# Patient Record
Sex: MALE | Race: WHITE | NOT HISPANIC OR LATINO | Employment: OTHER | URBAN - METROPOLITAN AREA
[De-identification: names, ages, dates, MRNs, and addresses within clinical notes are randomized per-mention and may not be internally consistent; named-entity substitution may affect disease eponyms.]

---

## 2017-03-28 ENCOUNTER — APPOINTMENT (OUTPATIENT)
Dept: LAB | Facility: CLINIC | Age: 82
End: 2017-03-28
Payer: MEDICARE

## 2017-03-28 ENCOUNTER — TRANSCRIBE ORDERS (OUTPATIENT)
Dept: LAB | Facility: CLINIC | Age: 82
End: 2017-03-28

## 2017-03-28 DIAGNOSIS — E11.00 TYPE 2 DIABETES MELLITUS WITH HYPEROSMOLARITY WITHOUT COMA, WITH LONG-TERM CURRENT USE OF INSULIN (HCC): Primary | ICD-10-CM

## 2017-03-28 DIAGNOSIS — G31.89: ICD-10-CM

## 2017-03-28 DIAGNOSIS — Z79.4 TYPE 2 DIABETES MELLITUS WITH HYPEROSMOLARITY WITHOUT COMA, WITH LONG-TERM CURRENT USE OF INSULIN (HCC): Primary | ICD-10-CM

## 2017-03-28 LAB
CREAT UR-MCNC: 113 MG/DL
EST. AVERAGE GLUCOSE BLD GHB EST-MCNC: 171 MG/DL
HBA1C MFR BLD: 7.6 % (ref 4.2–6.3)
MICROALBUMIN UR-MCNC: 36.2 MG/L (ref 0–20)
MICROALBUMIN/CREAT 24H UR: 32 MG/G CREATININE (ref 0–30)
VIT B12 SERPL-MCNC: 251 PG/ML (ref 100–900)

## 2017-03-28 PROCEDURE — 82607 VITAMIN B-12: CPT

## 2017-03-28 PROCEDURE — 82043 UR ALBUMIN QUANTITATIVE: CPT

## 2017-03-28 PROCEDURE — 83036 HEMOGLOBIN GLYCOSYLATED A1C: CPT

## 2017-03-28 PROCEDURE — 36415 COLL VENOUS BLD VENIPUNCTURE: CPT

## 2017-03-28 PROCEDURE — 82570 ASSAY OF URINE CREATININE: CPT

## 2017-07-31 ENCOUNTER — APPOINTMENT (OUTPATIENT)
Dept: LAB | Facility: CLINIC | Age: 82
End: 2017-07-31
Payer: MEDICARE

## 2017-07-31 ENCOUNTER — TRANSCRIBE ORDERS (OUTPATIENT)
Dept: LAB | Facility: CLINIC | Age: 82
End: 2017-07-31

## 2017-07-31 DIAGNOSIS — E13.9 OTHER SPECIFIED DIABETES MELLITUS: Primary | ICD-10-CM

## 2017-07-31 LAB
ALT SERPL W P-5'-P-CCNC: 26 U/L (ref 12–78)
ANION GAP SERPL CALCULATED.3IONS-SCNC: 7 MMOL/L (ref 4–13)
BUN SERPL-MCNC: 13 MG/DL (ref 5–25)
CALCIUM SERPL-MCNC: 8.5 MG/DL (ref 8.3–10.1)
CHLORIDE SERPL-SCNC: 105 MMOL/L (ref 100–108)
CHOLEST SERPL-MCNC: 112 MG/DL (ref 50–200)
CO2 SERPL-SCNC: 28 MMOL/L (ref 21–32)
CREAT SERPL-MCNC: 1.13 MG/DL (ref 0.6–1.3)
EST. AVERAGE GLUCOSE BLD GHB EST-MCNC: 200 MG/DL
GFR SERPL CREATININE-BSD FRML MDRD: 59 ML/MIN/1.73SQ M
GLUCOSE P FAST SERPL-MCNC: 229 MG/DL (ref 65–99)
HBA1C MFR BLD: 8.6 % (ref 4.2–6.3)
HDLC SERPL-MCNC: 38 MG/DL (ref 40–60)
LDLC SERPL CALC-MCNC: 38 MG/DL (ref 0–100)
POTASSIUM SERPL-SCNC: 4.1 MMOL/L (ref 3.5–5.3)
SODIUM SERPL-SCNC: 140 MMOL/L (ref 136–145)
TRIGL SERPL-MCNC: 180 MG/DL

## 2017-07-31 PROCEDURE — 80061 LIPID PANEL: CPT

## 2017-07-31 PROCEDURE — 36415 COLL VENOUS BLD VENIPUNCTURE: CPT

## 2017-07-31 PROCEDURE — 84460 ALANINE AMINO (ALT) (SGPT): CPT

## 2017-07-31 PROCEDURE — 83036 HEMOGLOBIN GLYCOSYLATED A1C: CPT

## 2017-07-31 PROCEDURE — 80048 BASIC METABOLIC PNL TOTAL CA: CPT

## 2017-09-19 ENCOUNTER — TRANSCRIBE ORDERS (OUTPATIENT)
Dept: LAB | Facility: CLINIC | Age: 82
End: 2017-09-19

## 2017-09-19 ENCOUNTER — APPOINTMENT (OUTPATIENT)
Dept: LAB | Facility: CLINIC | Age: 82
End: 2017-09-19
Payer: MEDICARE

## 2017-09-19 DIAGNOSIS — R11.0 NAUSEA ALONE: ICD-10-CM

## 2017-09-19 DIAGNOSIS — R53.83 FATIGUE, UNSPECIFIED TYPE: ICD-10-CM

## 2017-09-19 DIAGNOSIS — M81.0 SENILE OSTEOPOROSIS: ICD-10-CM

## 2017-09-19 DIAGNOSIS — E55.9 UNSPECIFIED VITAMIN D DEFICIENCY: ICD-10-CM

## 2017-09-19 DIAGNOSIS — D50.9 IRON DEFICIENCY ANEMIA, UNSPECIFIED: ICD-10-CM

## 2017-09-19 DIAGNOSIS — D51.1 MEGALOBLASTIC ANEMIA DUE TO VITAMIN B12 MALABSORPTION WITH PROTEINURIA: ICD-10-CM

## 2017-09-19 DIAGNOSIS — K90.89 OTHER SPECIFIED INTESTINAL MALABSORPTION: ICD-10-CM

## 2017-09-19 DIAGNOSIS — D50.0 IRON DEFICIENCY ANEMIA SECONDARY TO BLOOD LOSS (CHRONIC): Primary | ICD-10-CM

## 2017-09-19 LAB
BASOPHILS # BLD AUTO: 0.03 THOUSANDS/ΜL (ref 0–0.1)
BASOPHILS NFR BLD AUTO: 0 % (ref 0–1)
EOSINOPHIL # BLD AUTO: 0.14 THOUSAND/ΜL (ref 0–0.61)
EOSINOPHIL NFR BLD AUTO: 1 % (ref 0–6)
ERYTHROCYTE [DISTWIDTH] IN BLOOD BY AUTOMATED COUNT: 13.5 % (ref 11.6–15.1)
FERRITIN SERPL-MCNC: 108 NG/ML (ref 8–388)
FOLATE SERPL-MCNC: >20 NG/ML (ref 3.1–17.5)
HCT VFR BLD AUTO: 41.4 % (ref 36.5–49.3)
HGB BLD-MCNC: 14.3 G/DL (ref 12–17)
LYMPHOCYTES # BLD AUTO: 1.42 THOUSANDS/ΜL (ref 0.6–4.47)
LYMPHOCYTES NFR BLD AUTO: 14 % (ref 14–44)
MCH RBC QN AUTO: 31.7 PG (ref 26.8–34.3)
MCHC RBC AUTO-ENTMCNC: 34.5 G/DL (ref 31.4–37.4)
MCV RBC AUTO: 92 FL (ref 82–98)
MONOCYTES # BLD AUTO: 0.71 THOUSAND/ΜL (ref 0.17–1.22)
MONOCYTES NFR BLD AUTO: 7 % (ref 4–12)
NEUTROPHILS # BLD AUTO: 7.61 THOUSANDS/ΜL (ref 1.85–7.62)
NEUTS SEG NFR BLD AUTO: 78 % (ref 43–75)
NRBC BLD AUTO-RTO: 0 /100 WBCS
PLATELET # BLD AUTO: 189 THOUSANDS/UL (ref 149–390)
PMV BLD AUTO: 11.1 FL (ref 8.9–12.7)
RBC # BLD AUTO: 4.51 MILLION/UL (ref 3.88–5.62)
TIBC SERPL-MCNC: 280 UG/DL (ref 250–450)
VIT B12 SERPL-MCNC: 318 PG/ML (ref 100–900)
WBC # BLD AUTO: 9.93 THOUSAND/UL (ref 4.31–10.16)

## 2017-09-19 PROCEDURE — 82728 ASSAY OF FERRITIN: CPT

## 2017-09-19 PROCEDURE — 36415 COLL VENOUS BLD VENIPUNCTURE: CPT

## 2017-09-19 PROCEDURE — 82746 ASSAY OF FOLIC ACID SERUM: CPT

## 2017-09-19 PROCEDURE — 85025 COMPLETE CBC W/AUTO DIFF WBC: CPT

## 2017-09-19 PROCEDURE — 82607 VITAMIN B-12: CPT

## 2017-09-19 PROCEDURE — 83550 IRON BINDING TEST: CPT

## 2017-12-08 ENCOUNTER — TRANSCRIBE ORDERS (OUTPATIENT)
Dept: LAB | Facility: CLINIC | Age: 82
End: 2017-12-08

## 2017-12-08 ENCOUNTER — APPOINTMENT (OUTPATIENT)
Dept: LAB | Facility: CLINIC | Age: 82
End: 2017-12-08
Payer: MEDICARE

## 2017-12-08 DIAGNOSIS — I10 ESSENTIAL HYPERTENSION, BENIGN: ICD-10-CM

## 2017-12-08 DIAGNOSIS — N18.30 CHRONIC KIDNEY DISEASE, STAGE III (MODERATE) (HCC): ICD-10-CM

## 2017-12-08 DIAGNOSIS — G31.84 MCI (MILD COGNITIVE IMPAIRMENT) WITH MEMORY LOSS: ICD-10-CM

## 2017-12-08 DIAGNOSIS — E11.8 DIABETIC COMPLICATION (HCC): Primary | ICD-10-CM

## 2017-12-08 LAB
ALBUMIN SERPL BCP-MCNC: 3.7 G/DL (ref 3.5–5)
ALP SERPL-CCNC: 75 U/L (ref 46–116)
ALT SERPL W P-5'-P-CCNC: 28 U/L (ref 12–78)
ANION GAP SERPL CALCULATED.3IONS-SCNC: 6 MMOL/L (ref 4–13)
AST SERPL W P-5'-P-CCNC: 28 U/L (ref 5–45)
BILIRUB SERPL-MCNC: 0.67 MG/DL (ref 0.2–1)
BUN SERPL-MCNC: 15 MG/DL (ref 5–25)
CALCIUM SERPL-MCNC: 9 MG/DL (ref 8.3–10.1)
CHLORIDE SERPL-SCNC: 106 MMOL/L (ref 100–108)
CO2 SERPL-SCNC: 28 MMOL/L (ref 21–32)
CREAT SERPL-MCNC: 0.98 MG/DL (ref 0.6–1.3)
CREAT UR-MCNC: 265 MG/DL
EST. AVERAGE GLUCOSE BLD GHB EST-MCNC: 189 MG/DL
GFR SERPL CREATININE-BSD FRML MDRD: 69 ML/MIN/1.73SQ M
GLUCOSE P FAST SERPL-MCNC: 155 MG/DL (ref 65–99)
HBA1C MFR BLD: 8.2 % (ref 4.2–6.3)
MICROALBUMIN UR-MCNC: 135 MG/L (ref 0–20)
MICROALBUMIN/CREAT 24H UR: 51 MG/G CREATININE (ref 0–30)
POTASSIUM SERPL-SCNC: 4.3 MMOL/L (ref 3.5–5.3)
PROT SERPL-MCNC: 7.6 G/DL (ref 6.4–8.2)
SODIUM SERPL-SCNC: 140 MMOL/L (ref 136–145)
TSH SERPL DL<=0.05 MIU/L-ACNC: 3.07 UIU/ML (ref 0.36–3.74)
VIT B12 SERPL-MCNC: 294 PG/ML (ref 100–900)

## 2017-12-08 PROCEDURE — 83036 HEMOGLOBIN GLYCOSYLATED A1C: CPT

## 2017-12-08 PROCEDURE — 80053 COMPREHEN METABOLIC PANEL: CPT

## 2017-12-08 PROCEDURE — 36415 COLL VENOUS BLD VENIPUNCTURE: CPT

## 2017-12-08 PROCEDURE — 82570 ASSAY OF URINE CREATININE: CPT

## 2017-12-08 PROCEDURE — 82607 VITAMIN B-12: CPT

## 2017-12-08 PROCEDURE — 84443 ASSAY THYROID STIM HORMONE: CPT

## 2017-12-08 PROCEDURE — 82043 UR ALBUMIN QUANTITATIVE: CPT

## 2018-01-19 ENCOUNTER — TRANSCRIBE ORDERS (OUTPATIENT)
Dept: LAB | Facility: CLINIC | Age: 83
End: 2018-01-19

## 2018-01-19 ENCOUNTER — APPOINTMENT (OUTPATIENT)
Dept: LAB | Facility: CLINIC | Age: 83
End: 2018-01-19
Payer: MEDICARE

## 2018-01-19 DIAGNOSIS — D51.0 PERNICIOUS ANEMIA: ICD-10-CM

## 2018-01-19 DIAGNOSIS — G31.84 MCI (MILD COGNITIVE IMPAIRMENT) WITH MEMORY LOSS: Primary | ICD-10-CM

## 2018-01-19 LAB
FOLATE SERPL-MCNC: >20 NG/ML (ref 3.1–17.5)
VIT B12 SERPL-MCNC: 339 PG/ML (ref 100–900)

## 2018-01-19 PROCEDURE — 82746 ASSAY OF FOLIC ACID SERUM: CPT

## 2018-01-19 PROCEDURE — 82607 VITAMIN B-12: CPT

## 2018-01-19 PROCEDURE — 36415 COLL VENOUS BLD VENIPUNCTURE: CPT

## 2018-07-02 ENCOUNTER — TRANSCRIBE ORDERS (OUTPATIENT)
Dept: LAB | Facility: CLINIC | Age: 83
End: 2018-07-02

## 2018-07-02 ENCOUNTER — APPOINTMENT (OUTPATIENT)
Dept: LAB | Facility: CLINIC | Age: 83
End: 2018-07-02
Payer: MEDICARE

## 2018-07-02 DIAGNOSIS — Z13.29 SCREENING FOR THYROID DISORDER: ICD-10-CM

## 2018-07-02 DIAGNOSIS — I10 ESSENTIAL HYPERTENSION, BENIGN: ICD-10-CM

## 2018-07-02 DIAGNOSIS — E11.9 DIABETES MELLITUS WITHOUT COMPLICATION (HCC): Primary | ICD-10-CM

## 2018-07-02 DIAGNOSIS — D51.0 PERNICIOUS ANEMIA: ICD-10-CM

## 2018-07-02 LAB
ANION GAP SERPL CALCULATED.3IONS-SCNC: 5 MMOL/L (ref 4–13)
BUN SERPL-MCNC: 13 MG/DL (ref 5–25)
CALCIUM SERPL-MCNC: 8.6 MG/DL (ref 8.3–10.1)
CHLORIDE SERPL-SCNC: 105 MMOL/L (ref 100–108)
CO2 SERPL-SCNC: 28 MMOL/L (ref 21–32)
CREAT SERPL-MCNC: 1.01 MG/DL (ref 0.6–1.3)
CREAT UR-MCNC: 335 MG/DL
EST. AVERAGE GLUCOSE BLD GHB EST-MCNC: 169 MG/DL
GFR SERPL CREATININE-BSD FRML MDRD: 67 ML/MIN/1.73SQ M
GLUCOSE P FAST SERPL-MCNC: 163 MG/DL (ref 65–99)
HBA1C MFR BLD: 7.5 % (ref 4.2–6.3)
MICROALBUMIN UR-MCNC: 64 MG/L (ref 0–20)
MICROALBUMIN/CREAT 24H UR: 19 MG/G CREATININE (ref 0–30)
POTASSIUM SERPL-SCNC: 3.9 MMOL/L (ref 3.5–5.3)
SODIUM SERPL-SCNC: 138 MMOL/L (ref 136–145)
TSH SERPL DL<=0.05 MIU/L-ACNC: 4.1 UIU/ML (ref 0.36–3.74)
VIT B12 SERPL-MCNC: 238 PG/ML (ref 100–900)

## 2018-07-02 PROCEDURE — 84443 ASSAY THYROID STIM HORMONE: CPT

## 2018-07-02 PROCEDURE — 80048 BASIC METABOLIC PNL TOTAL CA: CPT

## 2018-07-02 PROCEDURE — 82043 UR ALBUMIN QUANTITATIVE: CPT

## 2018-07-02 PROCEDURE — 82570 ASSAY OF URINE CREATININE: CPT

## 2018-07-02 PROCEDURE — 83036 HEMOGLOBIN GLYCOSYLATED A1C: CPT

## 2018-07-02 PROCEDURE — 82607 VITAMIN B-12: CPT

## 2018-07-02 PROCEDURE — 36415 COLL VENOUS BLD VENIPUNCTURE: CPT

## 2018-11-07 ENCOUNTER — APPOINTMENT (OUTPATIENT)
Dept: LAB | Facility: CLINIC | Age: 83
End: 2018-11-07
Payer: MEDICARE

## 2018-11-07 ENCOUNTER — TRANSCRIBE ORDERS (OUTPATIENT)
Dept: LAB | Facility: CLINIC | Age: 83
End: 2018-11-07

## 2018-11-07 DIAGNOSIS — E03.9 MYXEDEMA HEART DISEASE: Primary | ICD-10-CM

## 2018-11-07 DIAGNOSIS — I51.9 MYXEDEMA HEART DISEASE: Primary | ICD-10-CM

## 2018-11-07 DIAGNOSIS — D51.0 PERNICIOUS ANEMIA: ICD-10-CM

## 2018-11-07 DIAGNOSIS — E13.8 DIABETES MELLITUS OF OTHER TYPE WITH COMPLICATION, UNSPECIFIED WHETHER LONG TERM INSULIN USE: ICD-10-CM

## 2018-11-07 DIAGNOSIS — G31.84 MCI (MILD COGNITIVE IMPAIRMENT) WITH MEMORY LOSS: ICD-10-CM

## 2018-11-07 DIAGNOSIS — D50.9 IRON DEFICIENCY ANEMIA, UNSPECIFIED IRON DEFICIENCY ANEMIA TYPE: ICD-10-CM

## 2018-11-07 LAB
ALBUMIN SERPL BCP-MCNC: 3.7 G/DL (ref 3.5–5)
ALP SERPL-CCNC: 79 U/L (ref 46–116)
ALT SERPL W P-5'-P-CCNC: 28 U/L (ref 12–78)
ANION GAP SERPL CALCULATED.3IONS-SCNC: 7 MMOL/L (ref 4–13)
AST SERPL W P-5'-P-CCNC: 19 U/L (ref 5–45)
BASOPHILS # BLD AUTO: 0.07 THOUSANDS/ΜL (ref 0–0.1)
BASOPHILS NFR BLD AUTO: 1 % (ref 0–1)
BILIRUB SERPL-MCNC: 0.65 MG/DL (ref 0.2–1)
BUN SERPL-MCNC: 14 MG/DL (ref 5–25)
CALCIUM SERPL-MCNC: 8.2 MG/DL (ref 8.3–10.1)
CHLORIDE SERPL-SCNC: 106 MMOL/L (ref 100–108)
CHOLEST SERPL-MCNC: 110 MG/DL (ref 50–200)
CO2 SERPL-SCNC: 29 MMOL/L (ref 21–32)
CREAT SERPL-MCNC: 1.11 MG/DL (ref 0.6–1.3)
EOSINOPHIL # BLD AUTO: 0.15 THOUSAND/ΜL (ref 0–0.61)
EOSINOPHIL NFR BLD AUTO: 1 % (ref 0–6)
ERYTHROCYTE [DISTWIDTH] IN BLOOD BY AUTOMATED COUNT: 13.6 % (ref 11.6–15.1)
EST. AVERAGE GLUCOSE BLD GHB EST-MCNC: 174 MG/DL
FOLATE SERPL-MCNC: >20 NG/ML (ref 3.1–17.5)
GFR SERPL CREATININE-BSD FRML MDRD: 59 ML/MIN/1.73SQ M
GLUCOSE P FAST SERPL-MCNC: 171 MG/DL (ref 65–99)
HBA1C MFR BLD: 7.7 % (ref 4.2–6.3)
HCT VFR BLD AUTO: 39.8 % (ref 36.5–49.3)
HDLC SERPL-MCNC: 36 MG/DL (ref 40–60)
HGB BLD-MCNC: 12.9 G/DL (ref 12–17)
IMM GRANULOCYTES # BLD AUTO: 0.05 THOUSAND/UL (ref 0–0.2)
IMM GRANULOCYTES NFR BLD AUTO: 1 % (ref 0–2)
IRON SERPL-MCNC: 62 UG/DL (ref 65–175)
LDLC SERPL CALC-MCNC: 51 MG/DL (ref 0–100)
LYMPHOCYTES # BLD AUTO: 1.61 THOUSANDS/ΜL (ref 0.6–4.47)
LYMPHOCYTES NFR BLD AUTO: 15 % (ref 14–44)
MCH RBC QN AUTO: 30.7 PG (ref 26.8–34.3)
MCHC RBC AUTO-ENTMCNC: 32.4 G/DL (ref 31.4–37.4)
MCV RBC AUTO: 95 FL (ref 82–98)
MONOCYTES # BLD AUTO: 0.73 THOUSAND/ΜL (ref 0.17–1.22)
MONOCYTES NFR BLD AUTO: 7 % (ref 4–12)
NEUTROPHILS # BLD AUTO: 7.96 THOUSANDS/ΜL (ref 1.85–7.62)
NEUTS SEG NFR BLD AUTO: 75 % (ref 43–75)
NONHDLC SERPL-MCNC: 74 MG/DL
NRBC BLD AUTO-RTO: 0 /100 WBCS
PLATELET # BLD AUTO: 208 THOUSANDS/UL (ref 149–390)
PMV BLD AUTO: 10.9 FL (ref 8.9–12.7)
POTASSIUM SERPL-SCNC: 3.8 MMOL/L (ref 3.5–5.3)
PROT SERPL-MCNC: 7.2 G/DL (ref 6.4–8.2)
RBC # BLD AUTO: 4.2 MILLION/UL (ref 3.88–5.62)
SODIUM SERPL-SCNC: 142 MMOL/L (ref 136–145)
TRIGL SERPL-MCNC: 114 MG/DL
TSH SERPL DL<=0.05 MIU/L-ACNC: 3.69 UIU/ML (ref 0.36–3.74)
VIT B12 SERPL-MCNC: 205 PG/ML (ref 100–900)
WBC # BLD AUTO: 10.57 THOUSAND/UL (ref 4.31–10.16)

## 2018-11-07 PROCEDURE — 80061 LIPID PANEL: CPT

## 2018-11-07 PROCEDURE — 36415 COLL VENOUS BLD VENIPUNCTURE: CPT

## 2018-11-07 PROCEDURE — 83540 ASSAY OF IRON: CPT

## 2018-11-07 PROCEDURE — 85025 COMPLETE CBC W/AUTO DIFF WBC: CPT

## 2018-11-07 PROCEDURE — 82746 ASSAY OF FOLIC ACID SERUM: CPT

## 2018-11-07 PROCEDURE — 82607 VITAMIN B-12: CPT

## 2018-11-07 PROCEDURE — 80053 COMPREHEN METABOLIC PANEL: CPT

## 2018-11-07 PROCEDURE — 84443 ASSAY THYROID STIM HORMONE: CPT

## 2018-11-07 PROCEDURE — 83036 HEMOGLOBIN GLYCOSYLATED A1C: CPT

## 2018-11-13 ENCOUNTER — APPOINTMENT (OUTPATIENT)
Dept: LAB | Facility: CLINIC | Age: 83
End: 2018-11-13
Payer: MEDICARE

## 2018-11-13 ENCOUNTER — TRANSCRIBE ORDERS (OUTPATIENT)
Dept: LAB | Facility: CLINIC | Age: 83
End: 2018-11-13

## 2018-11-13 DIAGNOSIS — E03.4 IDIOPATHIC ATROPHIC HYPOTHYROIDISM: Primary | ICD-10-CM

## 2018-11-13 LAB — TSH SERPL DL<=0.05 MIU/L-ACNC: 2.99 UIU/ML (ref 0.36–3.74)

## 2018-11-13 PROCEDURE — 84443 ASSAY THYROID STIM HORMONE: CPT

## 2018-11-13 PROCEDURE — 36415 COLL VENOUS BLD VENIPUNCTURE: CPT

## 2019-01-10 ENCOUNTER — APPOINTMENT (OUTPATIENT)
Dept: LAB | Facility: CLINIC | Age: 84
End: 2019-01-10
Payer: MEDICARE

## 2019-01-10 ENCOUNTER — TRANSCRIBE ORDERS (OUTPATIENT)
Dept: LAB | Facility: CLINIC | Age: 84
End: 2019-01-10

## 2019-01-10 DIAGNOSIS — G31.84 MCI (MILD COGNITIVE IMPAIRMENT) WITH MEMORY LOSS: Primary | ICD-10-CM

## 2019-01-10 DIAGNOSIS — E13.8 DIABETES MELLITUS OF OTHER TYPE WITH COMPLICATION, UNSPECIFIED WHETHER LONG TERM INSULIN USE: ICD-10-CM

## 2019-01-10 LAB
ALBUMIN SERPL BCP-MCNC: 3.8 G/DL (ref 3.5–5)
ALP SERPL-CCNC: 98 U/L (ref 46–116)
ALT SERPL W P-5'-P-CCNC: 27 U/L (ref 12–78)
ANION GAP SERPL CALCULATED.3IONS-SCNC: 7 MMOL/L (ref 4–13)
AST SERPL W P-5'-P-CCNC: 18 U/L (ref 5–45)
BILIRUB SERPL-MCNC: 0.95 MG/DL (ref 0.2–1)
BUN SERPL-MCNC: 15 MG/DL (ref 5–25)
CALCIUM SERPL-MCNC: 8.8 MG/DL (ref 8.3–10.1)
CHLORIDE SERPL-SCNC: 103 MMOL/L (ref 100–108)
CO2 SERPL-SCNC: 27 MMOL/L (ref 21–32)
CREAT SERPL-MCNC: 1.16 MG/DL (ref 0.6–1.3)
EST. AVERAGE GLUCOSE BLD GHB EST-MCNC: 189 MG/DL
GFR SERPL CREATININE-BSD FRML MDRD: 56 ML/MIN/1.73SQ M
GLUCOSE P FAST SERPL-MCNC: 181 MG/DL (ref 65–99)
HBA1C MFR BLD: 8.2 % (ref 4.2–6.3)
POTASSIUM SERPL-SCNC: 4.4 MMOL/L (ref 3.5–5.3)
PROT SERPL-MCNC: 7.3 G/DL (ref 6.4–8.2)
SODIUM SERPL-SCNC: 137 MMOL/L (ref 136–145)
VIT B12 SERPL-MCNC: 447 PG/ML (ref 100–900)

## 2019-01-10 PROCEDURE — 83036 HEMOGLOBIN GLYCOSYLATED A1C: CPT

## 2019-01-10 PROCEDURE — 80053 COMPREHEN METABOLIC PANEL: CPT

## 2019-01-10 PROCEDURE — 36415 COLL VENOUS BLD VENIPUNCTURE: CPT

## 2019-01-10 PROCEDURE — 82607 VITAMIN B-12: CPT

## 2019-02-18 ENCOUNTER — APPOINTMENT (OUTPATIENT)
Dept: LAB | Facility: CLINIC | Age: 84
End: 2019-02-18
Payer: MEDICARE

## 2019-02-18 ENCOUNTER — TRANSCRIBE ORDERS (OUTPATIENT)
Dept: LAB | Facility: CLINIC | Age: 84
End: 2019-02-18

## 2019-02-18 DIAGNOSIS — E13.8 DIABETES MELLITUS OF OTHER TYPE WITH COMPLICATION, UNSPECIFIED WHETHER LONG TERM INSULIN USE: Primary | ICD-10-CM

## 2019-02-18 DIAGNOSIS — D51.0 PERNICIOUS ANEMIA: ICD-10-CM

## 2019-02-18 DIAGNOSIS — E03.4 IDIOPATHIC ATROPHIC HYPOTHYROIDISM: ICD-10-CM

## 2019-02-18 LAB
EST. AVERAGE GLUCOSE BLD GHB EST-MCNC: 171 MG/DL
HBA1C MFR BLD: 7.6 % (ref 4.2–6.3)
TSH SERPL DL<=0.05 MIU/L-ACNC: 3.06 UIU/ML (ref 0.36–3.74)
VIT B12 SERPL-MCNC: 565 PG/ML (ref 100–900)

## 2019-02-18 PROCEDURE — 82607 VITAMIN B-12: CPT

## 2019-02-18 PROCEDURE — 36415 COLL VENOUS BLD VENIPUNCTURE: CPT

## 2019-02-18 PROCEDURE — 84443 ASSAY THYROID STIM HORMONE: CPT

## 2019-02-18 PROCEDURE — 83036 HEMOGLOBIN GLYCOSYLATED A1C: CPT

## 2019-06-13 ENCOUNTER — OFFICE VISIT (OUTPATIENT)
Dept: NEUROLOGY | Facility: CLINIC | Age: 84
End: 2019-06-13
Payer: MEDICARE

## 2019-06-13 VITALS
WEIGHT: 184 LBS | BODY MASS INDEX: 24.92 KG/M2 | SYSTOLIC BLOOD PRESSURE: 110 MMHG | HEART RATE: 101 BPM | HEIGHT: 72 IN | DIASTOLIC BLOOD PRESSURE: 68 MMHG

## 2019-06-13 DIAGNOSIS — F02.80 SENILE DEMENTIA OF ALZHEIMER'S TYPE (HCC): ICD-10-CM

## 2019-06-13 DIAGNOSIS — R41.3 SHORT-TERM MEMORY LOSS: Primary | ICD-10-CM

## 2019-06-13 DIAGNOSIS — G30.1 SENILE DEMENTIA OF ALZHEIMER'S TYPE (HCC): ICD-10-CM

## 2019-06-13 PROCEDURE — 99204 OFFICE O/P NEW MOD 45 MIN: CPT | Performed by: PSYCHIATRY & NEUROLOGY

## 2019-06-13 RX ORDER — DONEPEZIL HYDROCHLORIDE 10 MG/1
10 TABLET, FILM COATED ORAL
Qty: 30 TABLET | Refills: 3 | Status: SHIPPED | OUTPATIENT
Start: 2019-06-13 | End: 2020-03-18 | Stop reason: SDUPTHER

## 2019-06-13 RX ORDER — DONEPEZIL HYDROCHLORIDE 5 MG/1
5 TABLET, FILM COATED ORAL
Qty: 30 TABLET | Refills: 0 | Status: SHIPPED | OUTPATIENT
Start: 2019-06-13 | End: 2020-03-18

## 2019-07-02 ENCOUNTER — HOSPITAL ENCOUNTER (OUTPATIENT)
Dept: RADIOLOGY | Facility: HOSPITAL | Age: 84
Discharge: HOME | End: 2019-07-02
Attending: PSYCHIATRY & NEUROLOGY
Payer: MEDICARE

## 2019-07-02 DIAGNOSIS — G30.1 SENILE DEMENTIA OF ALZHEIMER'S TYPE (HCC): ICD-10-CM

## 2019-07-02 DIAGNOSIS — F02.80 SENILE DEMENTIA OF ALZHEIMER'S TYPE (HCC): ICD-10-CM

## 2019-07-02 DIAGNOSIS — R41.3 SHORT-TERM MEMORY LOSS: ICD-10-CM

## 2019-07-02 PROCEDURE — 70553 MRI BRAIN STEM W/O & W/DYE: CPT

## 2019-07-02 PROCEDURE — A9585 GADOBUTROL INJECTION: HCPCS | Performed by: PSYCHIATRY & NEUROLOGY

## 2019-07-02 RX ADMIN — GADOBUTROL 8 ML: 604.72 INJECTION INTRAVENOUS at 15:02

## 2019-07-10 ENCOUNTER — TELEPHONE (OUTPATIENT)
Dept: NEUROLOGY | Facility: CLINIC | Age: 84
End: 2019-07-10

## 2019-07-10 NOTE — TELEPHONE ENCOUNTER
I spoke with Mrs Gino Johnston in regards to Darral Huge  I asked if he was tolerating the Donepezil without any issues, and if they had received the 10 mg dosage  She was uncertain and thought he was having diarrhea from it  She is going to check with him and call me back as he was unavailable at the present time

## 2019-07-10 NOTE — TELEPHONE ENCOUNTER
----- Message from Jeni Mills MD sent at 7/5/2019  2:24 PM EDT -----  His MRI brain shows some atrophy overall and in areas that control our memory  Just favors diagnosis of alzheimer's dementia

## 2019-07-15 ENCOUNTER — APPOINTMENT (OUTPATIENT)
Dept: LAB | Facility: CLINIC | Age: 84
End: 2019-07-15
Payer: MEDICARE

## 2019-07-15 ENCOUNTER — TRANSCRIBE ORDERS (OUTPATIENT)
Dept: LAB | Facility: CLINIC | Age: 84
End: 2019-07-15

## 2019-07-15 DIAGNOSIS — D51.0 PERNICIOUS ANEMIA: ICD-10-CM

## 2019-07-15 DIAGNOSIS — N18.30 CHRONIC KIDNEY DISEASE, STAGE III (MODERATE) (HCC): ICD-10-CM

## 2019-07-15 DIAGNOSIS — E13.8 DIABETES MELLITUS OF OTHER TYPE WITH COMPLICATION, UNSPECIFIED WHETHER LONG TERM INSULIN USE: Primary | ICD-10-CM

## 2019-07-15 DIAGNOSIS — G31.84 MCI (MILD COGNITIVE IMPAIRMENT) WITH MEMORY LOSS: ICD-10-CM

## 2019-07-15 LAB
CREAT UR-MCNC: 298 MG/DL
EST. AVERAGE GLUCOSE BLD GHB EST-MCNC: 171 MG/DL
HBA1C MFR BLD: 7.6 % (ref 4.2–6.3)
MICROALBUMIN UR-MCNC: 72.5 MG/L (ref 0–20)
MICROALBUMIN/CREAT 24H UR: 24 MG/G CREATININE (ref 0–30)
TSH SERPL DL<=0.05 MIU/L-ACNC: 3.61 UIU/ML (ref 0.36–3.74)
VIT B12 SERPL-MCNC: 538 PG/ML (ref 100–900)

## 2019-07-15 PROCEDURE — 82570 ASSAY OF URINE CREATININE: CPT

## 2019-07-15 PROCEDURE — 82607 VITAMIN B-12: CPT

## 2019-07-15 PROCEDURE — 84443 ASSAY THYROID STIM HORMONE: CPT

## 2019-07-15 PROCEDURE — 36415 COLL VENOUS BLD VENIPUNCTURE: CPT

## 2019-07-15 PROCEDURE — 82043 UR ALBUMIN QUANTITATIVE: CPT

## 2019-07-15 PROCEDURE — 83036 HEMOGLOBIN GLYCOSYLATED A1C: CPT

## 2019-10-16 ENCOUNTER — OFFICE VISIT (OUTPATIENT)
Dept: NEUROLOGY | Facility: CLINIC | Age: 84
End: 2019-10-16
Payer: MEDICARE

## 2019-10-16 VITALS
WEIGHT: 188 LBS | DIASTOLIC BLOOD PRESSURE: 76 MMHG | BODY MASS INDEX: 25.47 KG/M2 | SYSTOLIC BLOOD PRESSURE: 138 MMHG | HEIGHT: 72 IN | HEART RATE: 86 BPM

## 2019-10-16 DIAGNOSIS — G30.1 SENILE DEMENTIA OF ALZHEIMER'S TYPE (HCC): Primary | ICD-10-CM

## 2019-10-16 DIAGNOSIS — F02.80 SENILE DEMENTIA OF ALZHEIMER'S TYPE (HCC): Primary | ICD-10-CM

## 2019-10-16 DIAGNOSIS — R40.0 SLEEPINESS: ICD-10-CM

## 2019-10-16 PROCEDURE — 99214 OFFICE O/P EST MOD 30 MIN: CPT | Performed by: PSYCHIATRY & NEUROLOGY

## 2019-10-16 NOTE — PROGRESS NOTES
Outpatient Neurology History and Physical  Vignesh Galarza  [id number]  89 y o   12/3/1930          Consult: Yes    Luisa Avila DO      Chief Complaint   Patient presents with    Memory Loss           History Obtained from: patient and wife     HPI:     Mr Zeinab Gordon is an 79 yo M with PMH of HTN, HLD presents as follow up  Wife had reported short term memory loss  He can not remember where some stores are  He drives without difficulty but wife guides him for directions  He will repeatedly forget where things are  Physically he's doing fairly well  Wife denies any difficulty getting up, with daily hyeigne  He doesn't know how to fix things at home  Patient was ordered to have MRI brain neuroquant  It revealed diffuse age appropriate volume loss, moderate degree of chronic microvascular disease  neuroquant analysis did reveal inferomedial temporal lobe neuro degenerative process  We had started him on Aricept but he's not sure whether he's taking it because can't recognize it by name  There has been no significant change since last visit  Past Medical History:   Diagnosis Date    Anemia     getting venofer once a week   Arthritis     Cancer ([facility] [address] [facility] )     NOSE    Coronary artery disease     carotid stents bilateral    Diabetes mellitus ([facility] )     Hearing aid worn     BILATERAL    History of transfusion 10/11/2015    Hyperlipidemia     Hypertension     Spinal stenosis     hx of L-ROHAN               Current Outpatient Medications on File Prior to Visit   Medication Sig Dispense Refill    atorvastatin (LIPITOR) 40 mg tablet Take 40 mg by mouth daily at bedtime   Cholecalciferol (VITAMIN D) 2000 UNITS tablet Take 2,000 Units by mouth daily in the early morning   clopidogrel (PLAVIX) 75 mg tablet Take 75 mg by mouth daily at bedtime        donepezil (ARICEPT) 10 mg tablet Take 1 tablet (10 mg total) by mouth daily at bedtime Starting July 14th 30 tablet 3    donepezil (ARICEPT) 5 mg tablet Take 1 tablet (5 mg total) by mouth daily at bedtime 30 tablet 0    folic acid (FOLVITE) 624 MCG tablet Take 400 mcg by mouth daily in the early morning   Iron Sucrose (VENOFER IV) Infuse into a venous catheter   metFORMIN (GLUCOPHAGE) 500 mg tablet Take 500 mg by mouth 3 (three) times a day   valsartan (DIOVAN) 80 mg tablet Take 80 mg by mouth daily in the early morning  No current facility-administered medications on file prior to visit  Allergies   Allergen Reactions    Other Swelling     Bee stings         History reviewed  No pertinent family history               Past Surgical History:   Procedure Laterality Date    APPENDECTOMY      CAROTID STENT Bilateral     CATARACT EXTRACTION W/ INTRAOCULAR LENS IMPLANT Left 2013    CHOLECYSTECTOMY      CYSTOSCOPY W/ URETERAL STENT PLACEMENT Right     CYSTOSCOPY W/ URETERAL STENT REMOVAL      FEMORAL ARTERY STENT  2013    HEMORRHOID SURGERY      HERNIA REPAIR      HIP ARTHROPLASTY Left 2013    JOINT REPLACEMENT Left     [place] TOTAL HIP ARTHROPLASTY Right 2016    Procedure: ARTHROPLASTY HIP TOTAL;  Surgeon: Sandra Acuña MD;  Location: [facility];  Service: Orthopedics    SKIN BIOPSY      removal of skin cancer nose    TONSILLECTOMY             Social History     Socioeconomic History    Marital status: /Civil Union     Spouse name: Not on file    Number of children: Not on file    Years of education: Not on file    Highest education level: Not on file   Occupational History    Not on file   Social Needs    Financial resource strain: Not on file    Food insecurity:     Worry: Not on file     Inability: Not on file    Transportation needs:     Medical: Not on file     Non-medical: Not on file   Tobacco Use    Smoking status: Former Smoker     Packs/day: 1 50     Years: 20 00     Pack years: 30 00     Last attempt to quit: 1996     Years since quittin 7    Smokeless tobacco: Never Used   Substance and Sexual Activity    Alcohol use: Yes     Comment: occ    Drug use: No    Sexual activity: Not on file   Lifestyle    Physical activity:     Days per week: Not on file     Minutes per session: Not on file    Stress: Not on file   Relationships    Social connections:     Talks on phone: Not on file     Gets together: Not on file     Attends Voodoo service: Not on file     Active member of club or organization: Not on file     Attends meetings of clubs or organizations: Not on file     Relationship status: Not on file    Intimate partner violence:     Fear of current or ex partner: Not on file     Emotionally abused: Not on file     Physically abused: Not on file     Forced sexual activity: Not on file   Other Topics Concern    Not on file   Social History Narrative    Not on file       Review of Systems  Refer to positive review of systems in HPI  Constitutional- No fever  Eyes- No visual change  ENT- Hearing normal  CV- No chest pain  Resp- No Shortness of breath  GI- No diarrhea  - Bladder normal  MS- No Arthritis   Skin- No rash  Psych- No depression  Endo- No DM  Heme- No nodes    PHYSICAL EXAM:    Vitals:    10/16/19 1353   BP: 138/76   BP Location: Left arm   Patient Position: Sitting   Cuff Size: Adult   Pulse: 86   Weight: 85 3 kg (188 lb)   Height: 6' (1 829 m)         Appearance: No Acute Distress  Ophthalmoscopic: Disc Flat, Normal fundus  Carotid/Heart/Peripheral Vascular: No Bruits, RRR  Orientation: Awake, Alert, and Oriented x 3  Mental status:  Memory: Registation 3/3 Recall 0/3  MOCA: 13/30 when done previously  Attention: Normal  Knowledge: Appropriate  Language: No aphasia  Speech: No dysarthria  Cranial Nerves:  2 No Visual Defect on Confrontation; Pupils round, equal, reactive to light  3,4,6 Extraocular Movements Intact; no nystagmus  5 Facial Sensation Intact  7 No facial asymmetry  8 Intact hearing  9,10 Palate symmetric, normal gag  11 Good shoulder shrug  12 Tongue Midline  Gait: Stable, No ataxia, can perform tandem walking  Coordination: No ataxia with finger to nose testing and heel to shin testing  Sensory: Intact, Symmetric to Pinprick, Light Touch, Vibration, and Joint Position  Muscle Tone: Normal  Muscle exam  Arm Right Left Leg Right Left   Deltoid 5/5 5/5 Iliopsoas 5/5 5/5   Biceps 5/5 5/5 Quads 5/5 5/5   Triceps 5/5 5/5 Hamstrings 5/5 5/5   Wrist Extension 5/5 5/5 Ankle Dorsi Flexion 5/5 5/5   Wrist Flexion 5/5 5/5 Ankle Plantar Flexion 5/5 5/5   Interossei 5/5 5/5 Ankle Eversion 5/5 5/5   APB 5/5 5/5 Ankle Inversion 5/5 5/5       Reflexes   RJ BJ TJ KJ AJ Plantars Jalloh's   Right 2+ 2+ 2+ 2+ 2+ Downgoing Not present   Left 2+ 2+ 2+ 2+ 2+ Downgoing Not present           Personal review of             Assessment/Plan:     1  Senile dementia of Alzheimer's type [facility])  Ambulatory referral to Psychiatry   2  Sleepiness       Patient has age appropriate cognitive impairment  Hearing impairment doesn't help  He is still independent with ADLs  He does cross word puzzles  Wife says he hasn't had much practice helping around  Couple argues about how he doesn't do much and patient denies  I don't think neuro psych testing will   Wife feels there is a lot of anger in him and he refuses to believe it  Will refer him to psychiatry to see if they can consider SSRI for anger management  He is advised not to drive alone  Encouraged being active  Total time of encounter: 30 min  More than 50% of time was spent in counseling and coordination of care of patient  CASSANDRA Noble    [facility]  [address]  Kenzie [address]

## 2019-11-08 ENCOUNTER — APPOINTMENT (OUTPATIENT)
Dept: LAB | Facility: CLINIC | Age: 84
End: 2019-11-08
Payer: MEDICARE

## 2019-11-08 ENCOUNTER — TRANSCRIBE ORDERS (OUTPATIENT)
Dept: LAB | Facility: CLINIC | Age: 84
End: 2019-11-08

## 2019-11-08 DIAGNOSIS — E11.22 TYPE 2 DIABETES MELLITUS WITH ESRD (END-STAGE RENAL DISEASE) (HCC): ICD-10-CM

## 2019-11-08 DIAGNOSIS — D51.0 PERNICIOUS ANEMIA: ICD-10-CM

## 2019-11-08 DIAGNOSIS — N18.30 CHRONIC KIDNEY DISEASE, STAGE III (MODERATE) (HCC): ICD-10-CM

## 2019-11-08 DIAGNOSIS — D50.9 IRON DEFICIENCY ANEMIA, UNSPECIFIED IRON DEFICIENCY ANEMIA TYPE: ICD-10-CM

## 2019-11-08 DIAGNOSIS — E03.4 IDIOPATHIC ATROPHIC HYPOTHYROIDISM: ICD-10-CM

## 2019-11-08 DIAGNOSIS — N18.6 TYPE 2 DIABETES MELLITUS WITH ESRD (END-STAGE RENAL DISEASE) (HCC): ICD-10-CM

## 2019-11-08 DIAGNOSIS — E78.00 HIGH CHOLESTEROL: ICD-10-CM

## 2019-11-08 DIAGNOSIS — E78.00 HIGH CHOLESTEROL: Primary | ICD-10-CM

## 2019-11-08 LAB
ALBUMIN SERPL BCP-MCNC: 4 G/DL (ref 3.5–5)
ALP SERPL-CCNC: 79 U/L (ref 46–116)
ALT SERPL W P-5'-P-CCNC: 18 U/L (ref 12–78)
ANION GAP SERPL CALCULATED.3IONS-SCNC: 7 MMOL/L (ref 4–13)
AST SERPL W P-5'-P-CCNC: 16 U/L (ref 5–45)
BASOPHILS # BLD AUTO: 0.07 THOUSANDS/ΜL (ref 0–0.1)
BASOPHILS NFR BLD AUTO: 1 % (ref 0–1)
BILIRUB SERPL-MCNC: 0.43 MG/DL (ref 0.2–1)
BUN SERPL-MCNC: 19 MG/DL (ref 5–25)
CALCIUM SERPL-MCNC: 8.5 MG/DL (ref 8.3–10.1)
CHLORIDE SERPL-SCNC: 107 MMOL/L (ref 100–108)
CHOLEST SERPL-MCNC: 110 MG/DL (ref 50–200)
CO2 SERPL-SCNC: 26 MMOL/L (ref 21–32)
CREAT SERPL-MCNC: 1.31 MG/DL (ref 0.6–1.3)
EOSINOPHIL # BLD AUTO: 0.11 THOUSAND/ΜL (ref 0–0.61)
EOSINOPHIL NFR BLD AUTO: 1 % (ref 0–6)
ERYTHROCYTE [DISTWIDTH] IN BLOOD BY AUTOMATED COUNT: 15.8 % (ref 11.6–15.1)
EST. AVERAGE GLUCOSE BLD GHB EST-MCNC: 163 MG/DL
GFR SERPL CREATININE-BSD FRML MDRD: 48 ML/MIN/1.73SQ M
GLUCOSE SERPL-MCNC: 210 MG/DL (ref 65–140)
HBA1C MFR BLD: 7.3 % (ref 4.2–6.3)
HCT VFR BLD AUTO: 35 % (ref 36.5–49.3)
HDLC SERPL-MCNC: 36 MG/DL
HGB BLD-MCNC: 10.7 G/DL (ref 12–17)
IMM GRANULOCYTES # BLD AUTO: 0.04 THOUSAND/UL (ref 0–0.2)
IMM GRANULOCYTES NFR BLD AUTO: 0 % (ref 0–2)
IRON SERPL-MCNC: 27 UG/DL (ref 65–175)
LDLC SERPL CALC-MCNC: 42 MG/DL (ref 0–100)
LYMPHOCYTES # BLD AUTO: 1.35 THOUSANDS/ΜL (ref 0.6–4.47)
LYMPHOCYTES NFR BLD AUTO: 12 % (ref 14–44)
MCH RBC QN AUTO: 26.8 PG (ref 26.8–34.3)
MCHC RBC AUTO-ENTMCNC: 30.6 G/DL (ref 31.4–37.4)
MCV RBC AUTO: 88 FL (ref 82–98)
MONOCYTES # BLD AUTO: 0.76 THOUSAND/ΜL (ref 0.17–1.22)
MONOCYTES NFR BLD AUTO: 7 % (ref 4–12)
NEUTROPHILS # BLD AUTO: 8.84 THOUSANDS/ΜL (ref 1.85–7.62)
NEUTS SEG NFR BLD AUTO: 79 % (ref 43–75)
NONHDLC SERPL-MCNC: 74 MG/DL
NRBC BLD AUTO-RTO: 0 /100 WBCS
PLATELET # BLD AUTO: 244 THOUSANDS/UL (ref 149–390)
PMV BLD AUTO: 11.2 FL (ref 8.9–12.7)
POTASSIUM SERPL-SCNC: 4.4 MMOL/L (ref 3.5–5.3)
PROT SERPL-MCNC: 7.3 G/DL (ref 6.4–8.2)
RBC # BLD AUTO: 3.99 MILLION/UL (ref 3.88–5.62)
SODIUM SERPL-SCNC: 140 MMOL/L (ref 136–145)
TRIGL SERPL-MCNC: 160 MG/DL
TSH SERPL DL<=0.05 MIU/L-ACNC: 3.29 UIU/ML (ref 0.36–3.74)
VIT B12 SERPL-MCNC: 279 PG/ML (ref 100–900)
WBC # BLD AUTO: 11.17 THOUSAND/UL (ref 4.31–10.16)

## 2019-11-08 PROCEDURE — 84443 ASSAY THYROID STIM HORMONE: CPT

## 2019-11-08 PROCEDURE — 85025 COMPLETE CBC W/AUTO DIFF WBC: CPT

## 2019-11-08 PROCEDURE — 83036 HEMOGLOBIN GLYCOSYLATED A1C: CPT

## 2019-11-08 PROCEDURE — 36415 COLL VENOUS BLD VENIPUNCTURE: CPT

## 2019-11-08 PROCEDURE — 82607 VITAMIN B-12: CPT

## 2019-11-08 PROCEDURE — 80061 LIPID PANEL: CPT

## 2019-11-08 PROCEDURE — 80053 COMPREHEN METABOLIC PANEL: CPT

## 2019-11-08 PROCEDURE — 83540 ASSAY OF IRON: CPT

## 2020-03-18 DIAGNOSIS — F02.80 SENILE DEMENTIA OF ALZHEIMER'S TYPE (HCC): ICD-10-CM

## 2020-03-18 DIAGNOSIS — G30.1 SENILE DEMENTIA OF ALZHEIMER'S TYPE (HCC): ICD-10-CM

## 2020-03-18 DIAGNOSIS — R41.3 SHORT-TERM MEMORY LOSS: ICD-10-CM

## 2020-03-18 RX ORDER — DONEPEZIL HYDROCHLORIDE 10 MG/1
10 TABLET, FILM COATED ORAL
Qty: 30 TABLET | Refills: 3 | OUTPATIENT
Start: 2020-03-18

## 2020-03-18 RX ORDER — DONEPEZIL HYDROCHLORIDE 5 MG/1
5 TABLET, FILM COATED ORAL
Qty: 30 TABLET | Refills: 0 | OUTPATIENT
Start: 2020-03-18

## 2020-03-18 RX ORDER — DONEPEZIL HYDROCHLORIDE 10 MG/1
10 TABLET, FILM COATED ORAL
Qty: 30 TABLET | Refills: 3 | Status: SHIPPED | OUTPATIENT
Start: 2020-03-18 | End: 2020-06-21

## 2020-03-18 NOTE — TELEPHONE ENCOUNTER
Daughter called and stated that they are going through the patients things as his wife passed away on 3/3/20  She found Aricept bottles and the PCP said they don't have this listed as a med that he is taking  I explained that Dr Ira Barron prescribed this back in June, but as of October he was unsure if he was taking it or not since he didn't recognize the name of it  She said the 10mg tabs were gone but there are 11 5mg tabs left  I told her that per Dr Gabby Pierce instructions, he was supposed to take the 5mg tabs for one month then increase to 10mg   Will send refills to [address] [place] [address]

## 2020-04-16 ENCOUNTER — TELEPHONE (OUTPATIENT)
Dept: NEUROLOGY | Facility: CLINIC | Age: 85
End: 2020-04-16

## 2020-04-21 ENCOUNTER — TELEMEDICINE (OUTPATIENT)
Dept: NEUROLOGY | Facility: CLINIC | Age: 85
End: 2020-04-21
Payer: MEDICARE

## 2020-04-21 DIAGNOSIS — G30.9: Primary | ICD-10-CM

## 2020-04-21 DIAGNOSIS — F02.81: Primary | ICD-10-CM

## 2020-04-21 PROCEDURE — 99443 PR PHYS/QHP TELEPHONE EVALUATION 21-30 MIN: CPT | Performed by: PSYCHIATRY & NEUROLOGY

## 2020-04-21 RX ORDER — IRON,CARBONYL/ASCORBIC ACID 65MG-125MG
TABLET, DELAYED RELEASE (ENTERIC COATED) ORAL 2 TIMES DAILY
COMMUNITY
Start: 2020-04-20 | End: 2021-10-05 | Stop reason: HOSPADM

## 2020-04-21 RX ORDER — ICOSAPENT ETHYL 1000 MG/1
CAPSULE ORAL
COMMUNITY
Start: 2020-03-25

## 2020-04-21 RX ORDER — TAMSULOSIN HYDROCHLORIDE 0.4 MG/1
0.4 CAPSULE ORAL
COMMUNITY
Start: 2020-03-18

## 2020-05-15 ENCOUNTER — TELEPHONE (OUTPATIENT)
Dept: NEUROLOGY | Facility: CLINIC | Age: 85
End: 2020-05-15

## 2020-06-21 DIAGNOSIS — F02.80 SENILE DEMENTIA OF ALZHEIMER'S TYPE (HCC): ICD-10-CM

## 2020-06-21 DIAGNOSIS — R41.3 SHORT-TERM MEMORY LOSS: ICD-10-CM

## 2020-06-21 DIAGNOSIS — G30.1 SENILE DEMENTIA OF ALZHEIMER'S TYPE (HCC): ICD-10-CM

## 2020-06-21 RX ORDER — DONEPEZIL HYDROCHLORIDE 10 MG/1
10 TABLET, FILM COATED ORAL
Qty: 30 TABLET | Refills: 3 | Status: SHIPPED | OUTPATIENT
Start: 2020-06-21 | End: 2021-09-17

## 2020-07-24 ENCOUNTER — TELEPHONE (OUTPATIENT)
Dept: NEUROLOGY | Facility: CLINIC | Age: 85
End: 2020-07-24

## 2020-07-24 NOTE — TELEPHONE ENCOUNTER
Received vm from patient wife- requesting for refill for Donepezil stating that she tried to get refills from pharmacy and they were told that it does not exist  I called [facility] and spoke with Shannan Sutton and confirmed with me that medication was refilled today  Called and left message to patient patient letting them know medication has been filled

## 2020-09-09 ENCOUNTER — APPOINTMENT (EMERGENCY)
Dept: RADIOLOGY | Facility: HOSPITAL | Age: 85
DRG: 378 | End: 2020-09-09
Payer: MEDICARE

## 2020-09-09 ENCOUNTER — HOSPITAL ENCOUNTER (INPATIENT)
Facility: HOSPITAL | Age: 85
LOS: 8 days | Discharge: NURSING, REHAB OR LONG-TERM CARE | DRG: 378 | End: 2020-09-18
Attending: EMERGENCY MEDICINE | Admitting: FAMILY MEDICINE
Payer: MEDICARE

## 2020-09-09 DIAGNOSIS — K62.5 RECTAL BLEEDING: ICD-10-CM

## 2020-09-09 DIAGNOSIS — D50.9 IRON DEFICIENCY ANEMIA, UNSPECIFIED IRON DEFICIENCY ANEMIA TYPE: ICD-10-CM

## 2020-09-09 DIAGNOSIS — R53.1 WEAKNESS: Primary | ICD-10-CM

## 2020-09-09 DIAGNOSIS — R29.810 FACIAL DROOP: ICD-10-CM

## 2020-09-09 PROBLEM — I10 ESSENTIAL HYPERTENSION: Status: ACTIVE | Noted: 2020-09-09

## 2020-09-09 PROBLEM — E78.5 DYSLIPIDEMIA: Status: ACTIVE | Noted: 2020-09-09

## 2020-09-09 PROBLEM — I77.9 CAROTID DISEASE, BILATERAL (HCC): Status: ACTIVE | Noted: 2020-09-09

## 2020-09-09 PROBLEM — E11.9 TYPE 2 DIABETES MELLITUS, WITHOUT LONG-TERM CURRENT USE OF INSULIN (HCC): Status: ACTIVE | Noted: 2020-09-09

## 2020-09-09 LAB
ANION GAP SERPL CALCULATED.3IONS-SCNC: 9 MMOL/L (ref 4–13)
APTT PPP: 25 SECONDS (ref 23–37)
BILIRUB UR QL STRIP: NEGATIVE
BUN SERPL-MCNC: 19 MG/DL (ref 5–25)
CALCIUM SERPL-MCNC: 8.2 MG/DL (ref 8.3–10.1)
CHLORIDE SERPL-SCNC: 107 MMOL/L (ref 100–108)
CHOLEST SERPL-MCNC: 76 MG/DL (ref 50–200)
CLARITY UR: CLEAR
CO2 SERPL-SCNC: 26 MMOL/L (ref 21–32)
COLOR UR: YELLOW
CREAT SERPL-MCNC: 1.11 MG/DL (ref 0.6–1.3)
ERYTHROCYTE [DISTWIDTH] IN BLOOD BY AUTOMATED COUNT: 16.4 % (ref 11.6–15.1)
EST. AVERAGE GLUCOSE BLD GHB EST-MCNC: 128 MG/DL
GFR SERPL CREATININE-BSD FRML MDRD: 59 ML/MIN/1.73SQ M
GLUCOSE SERPL-MCNC: 123 MG/DL (ref 65–140)
GLUCOSE SERPL-MCNC: 133 MG/DL (ref 65–140)
GLUCOSE UR STRIP-MCNC: NEGATIVE MG/DL
HBA1C MFR BLD: 6.1 %
HCT VFR BLD AUTO: 27.9 % (ref 36.5–49.3)
HDLC SERPL-MCNC: 38 MG/DL
HGB BLD-MCNC: 8.7 G/DL (ref 12–17)
HGB UR QL STRIP.AUTO: NEGATIVE
INR PPP: 1.13 (ref 0.84–1.19)
KETONES UR STRIP-MCNC: NEGATIVE MG/DL
LDLC SERPL CALC-MCNC: 26 MG/DL (ref 0–100)
LEUKOCYTE ESTERASE UR QL STRIP: NEGATIVE
MCH RBC QN AUTO: 30.7 PG (ref 26.8–34.3)
MCHC RBC AUTO-ENTMCNC: 31.2 G/DL (ref 31.4–37.4)
MCV RBC AUTO: 99 FL (ref 82–98)
NITRITE UR QL STRIP: NEGATIVE
PH UR STRIP.AUTO: 5 [PH]
PLATELET # BLD AUTO: 208 THOUSANDS/UL (ref 149–390)
PMV BLD AUTO: 10.4 FL (ref 8.9–12.7)
POTASSIUM SERPL-SCNC: 4.1 MMOL/L (ref 3.5–5.3)
PROT UR STRIP-MCNC: NEGATIVE MG/DL
PROTHROMBIN TIME: 14.4 SECONDS (ref 11.6–14.5)
RBC # BLD AUTO: 2.83 MILLION/UL (ref 3.88–5.62)
SODIUM SERPL-SCNC: 142 MMOL/L (ref 136–145)
SP GR UR STRIP.AUTO: 1.01 (ref 1–1.03)
TRIGL SERPL-MCNC: 61 MG/DL
TROPONIN I SERPL-MCNC: <0.02 NG/ML
TROPONIN I SERPL-MCNC: <0.02 NG/ML
UROBILINOGEN UR QL STRIP.AUTO: 0.2 E.U./DL
WBC # BLD AUTO: 10.24 THOUSAND/UL (ref 4.31–10.16)

## 2020-09-09 PROCEDURE — 70498 CT ANGIOGRAPHY NECK: CPT

## 2020-09-09 PROCEDURE — 85610 PROTHROMBIN TIME: CPT | Performed by: EMERGENCY MEDICINE

## 2020-09-09 PROCEDURE — 82948 REAGENT STRIP/BLOOD GLUCOSE: CPT

## 2020-09-09 PROCEDURE — 99220 PR INITIAL OBSERVATION CARE/DAY 70 MINUTES: CPT | Performed by: FAMILY MEDICINE

## 2020-09-09 PROCEDURE — 70496 CT ANGIOGRAPHY HEAD: CPT

## 2020-09-09 PROCEDURE — 99215 OFFICE O/P EST HI 40 MIN: CPT | Performed by: PSYCHIATRY & NEUROLOGY

## 2020-09-09 PROCEDURE — 84484 ASSAY OF TROPONIN QUANT: CPT | Performed by: FAMILY MEDICINE

## 2020-09-09 PROCEDURE — 93005 ELECTROCARDIOGRAM TRACING: CPT

## 2020-09-09 PROCEDURE — 85027 COMPLETE CBC AUTOMATED: CPT | Performed by: EMERGENCY MEDICINE

## 2020-09-09 PROCEDURE — 83036 HEMOGLOBIN GLYCOSYLATED A1C: CPT | Performed by: NURSE PRACTITIONER

## 2020-09-09 PROCEDURE — 99291 CRITICAL CARE FIRST HOUR: CPT

## 2020-09-09 PROCEDURE — G1004 CDSM NDSC: HCPCS

## 2020-09-09 PROCEDURE — 81003 URINALYSIS AUTO W/O SCOPE: CPT | Performed by: EMERGENCY MEDICINE

## 2020-09-09 PROCEDURE — 85730 THROMBOPLASTIN TIME PARTIAL: CPT | Performed by: EMERGENCY MEDICINE

## 2020-09-09 PROCEDURE — 36415 COLL VENOUS BLD VENIPUNCTURE: CPT | Performed by: EMERGENCY MEDICINE

## 2020-09-09 PROCEDURE — 80061 LIPID PANEL: CPT | Performed by: NURSE PRACTITIONER

## 2020-09-09 PROCEDURE — 84484 ASSAY OF TROPONIN QUANT: CPT | Performed by: EMERGENCY MEDICINE

## 2020-09-09 PROCEDURE — 99291 CRITICAL CARE FIRST HOUR: CPT | Performed by: EMERGENCY MEDICINE

## 2020-09-09 PROCEDURE — 80048 BASIC METABOLIC PNL TOTAL CA: CPT | Performed by: EMERGENCY MEDICINE

## 2020-09-09 RX ORDER — ASPIRIN 325 MG
325 TABLET ORAL DAILY
Status: DISCONTINUED | OUTPATIENT
Start: 2020-09-10 | End: 2020-09-10

## 2020-09-09 RX ORDER — LANOLIN ALCOHOL/MO/W.PET/CERES
400 CREAM (GRAM) TOPICAL
Status: DISCONTINUED | OUTPATIENT
Start: 2020-09-10 | End: 2020-09-18 | Stop reason: HOSPADM

## 2020-09-09 RX ORDER — ATORVASTATIN CALCIUM 80 MG/1
80 TABLET, FILM COATED ORAL
Status: DISCONTINUED | OUTPATIENT
Start: 2020-09-10 | End: 2020-09-10

## 2020-09-09 RX ORDER — ATORVASTATIN CALCIUM 80 MG/1
80 TABLET, FILM COATED ORAL ONCE
Status: COMPLETED | OUTPATIENT
Start: 2020-09-09 | End: 2020-09-09

## 2020-09-09 RX ORDER — DONEPEZIL HYDROCHLORIDE 5 MG/1
10 TABLET, FILM COATED ORAL
Status: DISCONTINUED | OUTPATIENT
Start: 2020-09-09 | End: 2020-09-18 | Stop reason: HOSPADM

## 2020-09-09 RX ORDER — CLOPIDOGREL BISULFATE 75 MG/1
75 TABLET ORAL
Status: DISCONTINUED | OUTPATIENT
Start: 2020-09-09 | End: 2020-09-10

## 2020-09-09 RX ORDER — ASPIRIN 325 MG
325 TABLET ORAL ONCE
Status: COMPLETED | OUTPATIENT
Start: 2020-09-09 | End: 2020-09-09

## 2020-09-09 RX ORDER — ALPRAZOLAM 0.25 MG/1
0.25 TABLET ORAL 2 TIMES DAILY PRN
COMMUNITY
End: 2020-09-18 | Stop reason: HOSPADM

## 2020-09-09 RX ORDER — MELATONIN
2000
Status: DISCONTINUED | OUTPATIENT
Start: 2020-09-10 | End: 2020-09-18 | Stop reason: HOSPADM

## 2020-09-09 RX ORDER — TAMSULOSIN HYDROCHLORIDE 0.4 MG/1
0.4 CAPSULE ORAL
Status: DISCONTINUED | OUTPATIENT
Start: 2020-09-09 | End: 2020-09-18 | Stop reason: HOSPADM

## 2020-09-09 RX ADMIN — CLOPIDOGREL BISULFATE 75 MG: 75 TABLET ORAL at 21:13

## 2020-09-09 RX ADMIN — TAMSULOSIN HYDROCHLORIDE 0.4 MG: 0.4 CAPSULE ORAL at 16:37

## 2020-09-09 RX ADMIN — ATORVASTATIN CALCIUM 80 MG: 80 TABLET, FILM COATED ORAL at 08:37

## 2020-09-09 RX ADMIN — ASPIRIN 325 MG: 325 TABLET ORAL at 08:37

## 2020-09-09 RX ADMIN — DONEPEZIL HYDROCHLORIDE 10 MG: 5 TABLET ORAL at 21:12

## 2020-09-09 NOTE — ASSESSMENT & PLAN NOTE
Initially antihypertensives held to allow for permissive hypertension and then due soft blood pressures  Blood pressures have improved    Coreyvan substituted with Cozaar while here and started at a lower dose

## 2020-09-09 NOTE — PLAN OF CARE
Problem: Potential for Falls  Goal: Patient will remain free of falls  Description: INTERVENTIONS:  - Assess patient frequently for physical needs  -  Identify cognitive and physical deficits and behaviors that affect risk of falls  -  McBee fall precautions as indicated by assessment   - Educate patient/family on patient safety including physical limitations  - Instruct patient to call for assistance with activity based on assessment  - Modify environment to reduce risk of injury  - Consider OT/PT consult to assist with strengthening/mobility  Outcome: Progressing     Problem: Nutrition/Hydration-ADULT  Goal: Nutrient/Hydration intake appropriate for improving, restoring or maintaining nutritional needs  Description: Monitor and assess patient's nutrition/hydration status for malnutrition  Collaborate with interdisciplinary team and initiate plan and interventions as ordered  Monitor patient's weight and dietary intake as ordered or per policy  Utilize nutrition screening tool and intervene as necessary  Determine patient's food preferences and provide high-protein, high-caloric foods as appropriate       INTERVENTIONS:  - Monitor oral intake, urinary output, labs, and treatment plans  - Assess nutrition and hydration status and recommend course of action  - Evaluate amount of meals eaten  - Assist patient with eating if necessary   - Allow adequate time for meals  - Recommend/ encourage appropriate diets, oral nutritional supplements, and vitamin/mineral supplements  - Order, calculate, and assess calorie counts as needed  - Recommend, monitor, and adjust tube feedings and TPN/PPN based on assessed needs  - Assess need for intravenous fluids  - Provide specific nutrition/hydration education as appropriate  - Include patient/family/caregiver in decisions related to nutrition  Outcome: Progressing

## 2020-09-09 NOTE — ASSESSMENT & PLAN NOTE
Status post bilateral carotid artery stent placement  Continue Lipitor  Patient has been on Plavix which is again held due to GI bleeding

## 2020-09-09 NOTE — CONSULTS
[address]   Neurology Initial Consult    Aniya Webb is a 80 y o  male  [address] [facility]          Information obtained from:   Chief Complaint   Patient presents with   [address]     Patient arrives as stroke alert LKW 4pm yesterday  L sided weakness         Assessment/Plan:  1  TIA versus possible right-sided ischemic infarct  2  HLD  3  HTN  4  Dementia    -monitor on telemetry  -neuro assessments per stroke pathway  -aspirin 325 mg daily  -continue Plavix 75 mg daily  -Lipitor 80 mg daily  -Aricept 10 mg daily  -labs: Added lipid panel and A1c to ER labs  -MRI of the brain  -echocardiogram with shunt study  -speech therapy  -PT/OT    Patient is an 51-year-old male who lives alone found to have left-sided weakness when his family followed up with him this a m  Angel Alfonso Patient is unclear what happened, notes that he is getting old  Patient stated that he is feeling weak, is supposed to walk with a cane however he does not  According to his family, patient suffered a fall in his home so they brought him to the ER  Patient was a stroke alert this a m , CT of the head as well as CTA of the head and neck were completed without any acute findings noted  Patient provided full-dose aspirin and Lipitor 80 mg and was admitted for stroke pathway  Patient to have MRI of the brain and echo with shunt study for further evaluation, labs for lipid panel and A1c are pending  Patient already on Plavix 75 mg at home, will also continue on full-dose aspirin and Lipitor at this time pending outcome of MRI and lab studies  PT/OT and speech therapy to follow up with patient with regards to functional capabilities  To note patient is independent home however has had recent MRI of the brain with neuro:  Studies for forgetfulness  Noted to have some neuro degenerative changes in the inferior medial temporal lobe and is on Aricept for this        HPI:  Aniya Webb this is an 51-year-old male with comorbid history consistent with anemia, CA of the nose, CAD, carotid atherosclerosis with stent placements bilaterally, hyperlipidemia, hypertension, spinal stenosis, and hard of hearing  Patient's last known well was 4:00 p m  Yesterday 09/08/2020 when he states that he had started to get weak  Patient states he does not exactly know what happened just that something went wrong  Patient's family checked on him this a m  And found him to have significant weakness  Reports that patient had fallen at home and noted that he does live alone  Patient is somewhat unclear as to what happened yesterday, reports having some weakness  Baseline patient ambulates with a cane although often times he will not use it  Denied having any headaches or dizziness, no chest pain shortness of breath, abdominal pain acute illness changes in his vision or hearing, difficulties with speaking or swallowing  Patient was brought to the ER this a m  And a stroke alert was called at 18  ER MD was contacted by Neurology at time of alert, given patient's last known normal, patient was out of the window for tPA therapeutic treatment  Patient was taken for CT scan without any intracranial abnormalities noted  CTA of the head and neck shows patent major vessels without critical stenosis in the [place] as well as atherosclerotic disease of the intracranial internal carotid and vertebral arteries  Patient was given full-dose aspirin and 80 of Lipitor, will be admitted to the hospital under telemetry for continued workup of possible stroke under the stroke pathway  Evaluation of patient at bedside, patient is alert and oriented x2 has had difficulty remembering the year  Was able to get the month  Patient is significantly hard of hearing, has to hearing aids in place which are helpful    Patient has adequate strength however has slight weakness in the left upper and lower extremity over the right although has minimal to no drift noted on pronation or elevation of lower extremities  Patient able to perform coordination testing without any dysmetria and has good speech pattern without slurring or mumbling  Patient is able to name 3 of 6 objects, detect NIH pictures and read multiple word sentences with good accuracy  Patient will continue on telemetry under stroke pathway, be seen by PT/OT  Continue on aspirin and statin therapies MRI of the brain and echocardiogram will be ordered  Past Medical History:   Diagnosis Date    Anemia     getting venofer once a week   Arthritis     Cancer ([facility] [address] )     NOSE    Coronary artery disease     carotid stents bilateral    Diabetes mellitus ([facility] [address] [facility] )     Hearing aid worn     BILATERAL    History of transfusion 10/11/2015    Hyperlipidemia     Hypertension     Spinal stenosis     hx of L-ROHAN       Past Surgical History:   Procedure Laterality Date    APPENDECTOMY      CAROTID STENT Bilateral     CATARACT EXTRACTION W/ INTRAOCULAR LENS IMPLANT Left 2013    CHOLECYSTECTOMY      CYSTOSCOPY W/ URETERAL STENT PLACEMENT Right     CYSTOSCOPY W/ URETERAL STENT REMOVAL      FEMORAL ARTERY STENT  2013    HEMORRHOID SURGERY      HERNIA REPAIR      HIP ARTHROPLASTY Left 2013    JOINT REPLACEMENT Left 2012    [place] TOTAL HIP ARTHROPLASTY Right 2/1/2016    Procedure: ARTHROPLASTY HIP TOTAL;  Surgeon: Donna Doss MD;  Location: [address] [place] [address];  Service: Orthopedics    SKIN BIOPSY      removal of skin cancer nose    TONSILLECTOMY         Allergies   Allergen Reactions    Other Swelling     Bee stings       No current facility-administered medications for this encounter  Social History     Socioeconomic History    Marital status:       Spouse name: Not on file    Number of children: Not on file    Years of education: Not on file    Highest education level: Not on file   Occupational History    Not on file   Social Needs    Financial resource strain: Not on file   Neftaly-[place]hoi insecurity     Worry: Not on file     Inability: Not on file    Transportation needs     Medical: Not on file     Non-medical: Not on file   Tobacco Use    Smoking status: Former Smoker     Packs/day: 1 50     Years: 20 00     Pack years: 30 00     Last attempt to quit: 1996     Years since quittin 6    Smokeless tobacco: Never Used   Substance and Sexual Activity    Alcohol use: Yes     Frequency: Monthly or less     Comment: occ    Drug use: No    Sexual activity: Not on file   Lifestyle    Physical activity     Days per week: Not on file     Minutes per session: Not on file    Stress: Not on file   Relationships    Social connections     Talks on phone: Not on file     Gets together: Not on file     Attends Church service: Not on file     Active member of club or organization: Not on file     Attends meetings of clubs or organizations: Not on file     Relationship status: Not on file    Intimate partner violence     Fear of current or ex partner: Not on file     Emotionally abused: Not on file     Physically abused: Not on file     Forced sexual activity: Not on file   Other Topics Concern    Not on file   Social History Narrative    Not on file       History reviewed  No pertinent family history  Review of systems:  Please see HPI for positive symptoms  Constitutional: No fever, no chills, no weight change  Ocular: No diplopia, no blurred vision, spots/zigzag lines  HEENT:  No sore throat, headache or congestion  COR:  No chest pain  No palpitations  Lungs:  no sob  GI:  no  nausea, no vomiting, no diarrhea, no constipation, no anorexia  :  No dysuria, frequency, or urgency  No hematuria  Musculoskeletal:  No joint pain or swelling   Skin:  No rash or itching  Psychiatric:  no anxiety, no depression  Endocrine:  No polyuria or polydipsia      Physical examination:  /62   Pulse 84   Temp 97 5 °F (36 4 °C)   Resp 18   Ht 6' (1 829 m)   SpO2 98%   BMI 25 50 kg/m²     GENERAL APPEARANCE:  The patient is alert, oriented x2  HEENT:  Head is normocephalic  Pupils are equal and reactive  NECK:  Supple without lymphadenopathy  HEART:  Regular rate and rhythm  LUNGS:  clear to auscultation  No crackles or wheezes are heard  ABDOMEN:  Soft, nontender, nondistended with good bowel sounds heard  EXTREMITIES:  Without cyanosis, clubbing or edema  Mental status: The patient is alert, attentive, and oriented x2  Had difficulty remembering month and year   Speech is clear and fluent, good repetition and comprehension  Was able to name 3 of 6 objects  Cranial nerves:  CN II: Visual fields are full to confrontation  Fundoscopic exam is normal with sharp discs and no vascular changes  Pupils are 3 mm and briskly reactive to light  CN III, IV, VI: At primary gaze, there is no eye deviation  CN V: Facial sensation is intact to pinprick in all 3 divisions bilaterally  Corneal responses are intact  CN VII: Face is assymmetric with normal eye closure and smile  Flat and about the mouth to the left  CN VIII: Hearing is normal to rubbing fingers  CN IX, X: Palate elevates symmetrically  Phonation is normal   CN XI: Head turning and shoulder shrug are intact  CN XII: Tongue is midline with normal movements and no atrophy  Motor: There is no pronator drift of out-stretched arms  Muscle bulk and tone are normal with slight decrease in left upper and lower extremity     Muscle exam  Arm Right Left Leg Right Left   Deltoid 5/5 4+/5 Iliopsoas 5/5 4+/5   Biceps 5/5 4+/5 Quads 5/5 4+/5   Triceps 5/5 4+/5 Hamstrings 5/5 4+/5   Wrist Extension 5/5 4+/5 Ankle Dorsi Flexion 5/5 4+/5   Wrist Flexion 5/5 4+/5 Ankle Plantar Flexion 5/5 4+/5   Interossei 5/5 5/5 Ankle Eversion       APB 5/5 5/5 Ankle Inversion            Reflexes    RJ BJ TJ KJ AJ Plantars Jalloh's   Right 2+ 2+ 2+ 2+ 2+ Downgoing Not present   Left 2+ 2+ 2+ 2+ 2+ Downgoing Not present Sensory:  Light touch, Temperature, position sense, and vibration sense are intact in fingers and toes  Slight decrease in sensation to his maxillary division as well as his arms bilaterally  Coordination:  Rapid alternating movements and fine finger movements are intact  There is mild dysmetria on finger-to-nose and heel-knee-shin bilaterally  There are no abnormal or extraneous movements  Romberg deferred while in ER, stroke alert  Gait/Stance:  Deferred while in ER, stroke alert    Lab Results   Component Value Date    WBC 10 24 (H) 09/09/2020    HGB 8 7 (L) 09/09/2020    HCT 27 9 (L) 09/09/2020    MCV 99 (H) 09/09/2020     09/09/2020     Lab Results   Component Value Date    HGBA1C 7 3 (H) 11/08/2019     Lab Results   Component Value Date    ALT 18 11/08/2019    AST 16 11/08/2019    ALKPHOS 79 11/08/2019    BILITOT 0 9 10/11/2015     Lab Results   Component Value Date    GLUCOSE 142 (H) 10/11/2015    CALCIUM 8 2 (L) 09/09/2020     10/11/2015    K 4 1 09/09/2020    CO2 26 09/09/2020     09/09/2020    BUN 19 09/09/2020    CREATININE 1 11 09/09/2020         Review of reports and notes reveal:  Independent Interpretation of images or specimens:  Ct Stroke Alert Brain Result Date: 9/9/2020  1  No acute intracranial CT abnormality  2   Chronic white matter microangiopathy  Findings were directly discussed with Kirit Ordoñez on 9/9/2020 7:50 AM  Workstation performed: [id number]     Cta Stroke Alert (head/neck) Result Date: 9/9/2020  1  Patent major vasculature of [place] without critical stenosis  Atherosclerotic disease of intracranial internal carotid and vertebral arteries  2   No hemodynamically significant stenosis of major cervical vasculature  Patent bilateral cervical carotid artery stents  Findings were directly discussed with Kirit Ordoñez on 9/9/2020 7:50 AM  Workstation performed: [id number]       Thank you for this consult      Total time of encounter: 79 Minutes  More than 50% of time was spent in counseling and coordination of care of patient

## 2020-09-09 NOTE — ASSESSMENT & PLAN NOTE
Lab Results   Component Value Date    HGBA1C 5 9 (H) 09/10/2020       Recent Labs     09/13/20  1613 09/13/20  2118 09/14/20  0705 09/14/20  1149   POCGLU 122 140 137 233*     Holding metformin  patient on Accu-Cheks with sliding scale insulin

## 2020-09-09 NOTE — ASSESSMENT & PLAN NOTE
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

## 2020-09-09 NOTE — ED NOTES
Patient waiting for room  Pt daughter in law at bedside unhappy with wait  Charge nurse emilee notified  Supervisor Feliberto Ling notified  As per [address] supervisor the floors are full and we are awaiting a D/C from fourth floor for a bed to become available  Pt and daughter in law updated to status and unhappy with the wait        Karen Amezcua RN  09/09/20 5064

## 2020-09-09 NOTE — ED PROVIDER NOTES
History  Chief Complaint   Patient presents with    STROKE Alert     Patient arrives as stroke alert LKW 4pm yesterday  L sided weakness     HPI    Patient arrives via EMS for further evaluation and treatment of left-sided weakness  He lives by himself  He was last known normal at 4:00 p m  Yesterday  His daughter-in-law checked on him today and found him to have weakness  He also fell several hours ago  He is hard of hearing but denies any acute symptoms  He is unsure if he is weak  He denies previous history of stroke  Prior to Admission Medications   Prescriptions Last Dose Informant Patient Reported? Taking? ALPRAZolam (XANAX) 0 25 mg tablet 2020 at Unknown time  Yes Yes   Sig: Take 0 25 mg by mouth 2 (two) times a day as needed for anxiety   Cholecalciferol (VITAMIN D) 2000 UNITS tablet 2020 at Unknown time  Yes Yes   Sig: Take 2,000 Units by mouth daily in the early morning  Cyanocobalamin (VITAMIN B 12 PO) 2020 at Unknown time  Yes Yes   Sig: Take 1,000 mg by mouth daily   Iron Sucrose (VENOFER IV) 2020 at Unknown time  Yes Yes   Sig: Infuse into a venous catheter  VASCEPA 1 g CAPS 2020 at Unknown time  Yes Yes   Sig: TAKE ONE CAPSULE BY MOUTH TWICE DAILY WITH FOOD SWALLOWING WHOLE  DO NOT CHEW, OPEN, DISSOVE AND/OR CRUSH  VITRON-C  MG TABS 2020 at Unknown time  Yes Yes   Si (two) times a day    atorvastatin (LIPITOR) 40 mg tablet 2020 at Unknown time  Yes Yes   Sig: Take 40 mg by mouth daily at bedtime  clopidogrel (PLAVIX) 75 mg tablet 2020 at Unknown time  Yes Yes   Sig: Take 75 mg by mouth daily at bedtime  donepezil (ARICEPT) 10 mg tablet 2020 at Unknown time  No Yes   Sig: TAKE 1 TABLET (10 MG TOTAL) BY MOUTH DAILY AT BEDTIME STARTING PA   folic acid (FOLVITE) 836 MCG tablet 2020 at Unknown time  Yes Yes   Sig: Take 400 mcg by mouth daily in the early morning     metFORMIN (GLUCOPHAGE) 500 mg tablet 2020 at Unknown time Yes Yes   Sig: Take 1,000 mg by mouth 2 (two) times a day with meals    tamsulosin (FLOMAX) 0 4 mg 2020 at Unknown time  Yes Yes   Si 4 mg daily with dinner    valsartan (DIOVAN) 80 mg tablet 2020 at Unknown time  Yes Yes   Sig: Take 80 mg by mouth daily in the early morning  Facility-Administered Medications: None       Past Medical History:   Diagnosis Date    Anemia     getting venofer once a week   Arthritis     Cancer [facility] [address] [facility]     NOSE    Coronary artery disease     carotid stents bilateral    Diabetes mellitus ([facility] [address] )     Hearing aid worn     BILATERAL    History of transfusion 10/11/2015    Hyperlipidemia     Hypertension     Spinal stenosis     hx of L-ROHAN       Past Surgical History:   Procedure Laterality Date    APPENDECTOMY      CAROTID STENT Bilateral     CATARACT EXTRACTION W/ INTRAOCULAR LENS IMPLANT Left 2013    CHOLECYSTECTOMY      CYSTOSCOPY W/ URETERAL STENT PLACEMENT Right     CYSTOSCOPY W/ URETERAL STENT REMOVAL      FEMORAL ARTERY STENT  2013    HEMORRHOID SURGERY      HERNIA REPAIR      HIP ARTHROPLASTY Left 2013    JOINT REPLACEMENT Left     NV TOTAL HIP ARTHROPLASTY Right 2016    Procedure: ARTHROPLASTY HIP TOTAL;  Surgeon: Gloria Oakley MD;  Location: [address] [place] [address];  Service: Orthopedics    SKIN BIOPSY      removal of skin cancer nose    TONSILLECTOMY         History reviewed  No pertinent family history  I have reviewed and agree with the history as documented      E-Cigarette/Vaping    E-Cigarette Use Never User      E-Cigarette/Vaping Substances     Social History     Tobacco Use    Smoking status: Former Smoker     Packs/day: 1 50     Years: 20 00     Pack years: 30 00     Last attempt to quit: 1996     Years since quittin 6    Smokeless tobacco: Never Used   Substance Use Topics    Alcohol use: Yes     Frequency: Monthly or less     Comment: occ    Drug use: No       Review of Systems   Neurological: Positive for weakness  All other systems reviewed and are negative  Physical Exam  Physical Exam  Vitals signs and nursing note reviewed  Constitutional:       Appearance: Normal appearance  HENT:      Head: Normocephalic  Nose: Nose normal    Eyes:      Extraocular Movements: Extraocular movements intact  Pupils: Pupils are equal, round, and reactive to light  Neck:      Musculoskeletal: Normal range of motion  Cardiovascular:      Rate and Rhythm: Normal rate  Pulmonary:      Effort: Pulmonary effort is normal    Abdominal:      General: Abdomen is flat  Musculoskeletal:      Comments: Subtle left upper extremity weakness and mild pronator drift  5/5 strength in lower extremities  Skin:     Capillary Refill: Capillary refill takes less than 2 seconds  Neurological:      Mental Status: He is alert and oriented to person, place, and time        Comments: Left-sided facial droop, spares forehead   Psychiatric:         Mood and Affect: Mood normal          Vital Signs  ED Triage Vitals [09/09/20 0728]   Temperature Pulse Respirations Blood Pressure SpO2   98 8 °F (37 1 °C) 97 18 118/58 95 %      Temp Source Heart Rate Source Patient Position - Orthostatic VS BP Location FiO2 (%)   Tympanic Monitor Lying Right arm --      Pain Score       --           Vitals:    09/09/20 0804 09/09/20 0805 09/09/20 0830 09/09/20 0845   BP: 124/59  123/57 132/60   Pulse:  88 84 86   Patient Position - Orthostatic VS:             Visual Acuity  Visual Acuity      Most Recent Value   L Pupil Size (mm)  2   R Pupil Size (mm)  2          ED Medications  Medications   aspirin tablet 325 mg (325 mg Oral Given 9/9/20 0837)   atorvastatin (LIPITOR) tablet 80 mg (80 mg Oral Given 9/9/20 0837)       Diagnostic Studies  Results Reviewed     Procedure Component Value Units Date/Time    UA w Reflex to Microscopic w Reflex to Culture [[id number]] Collected:  09/09/20 0831    Lab Status:  Final result Specimen:  Urine, Other Updated:  09/09/20 7894     Color, UA Yellow     Clarity, UA Clear     Specific Gravity, UA 1 010     pH, UA 5 0     Leukocytes, UA Negative     Nitrite, UA Negative     Protein, UA Negative mg/dl      Glucose, UA Negative mg/dl      Ketones, UA Negative mg/dl      Urobilinogen, UA 0 2 E U /dl      Bilirubin, UA Negative     Blood, UA Negative    Troponin I [[id number]]  (Normal) Collected:  09/09/20 0756    Lab Status:  Final result Specimen:  Blood from Arm, Left Updated:  09/09/20 0829     Troponin I <0 02 ng/mL     Basic metabolic panel [[id number]]  (Abnormal) Collected:  09/09/20 0756    Lab Status:  Final result Specimen:  Blood from Arm, Left Updated:  09/09/20 0825     Sodium 142 mmol/L      Potassium 4 1 mmol/L      Chloride 107 mmol/L      CO2 26 mmol/L      ANION GAP 9 mmol/L      BUN 19 mg/dL      Creatinine 1 11 mg/dL      Glucose 123 mg/dL      Calcium 8 2 mg/dL      eGFR 59 ml/min/1 73sq m     Narrative:       [place] guidelines for Chronic Kidney Disease (CKD):     Stage 1 with normal or high GFR (GFR > 90 mL/min/1 73 square meters)    Stage 2 Mild CKD (GFR = 60-89 mL/min/1 73 square meters)    Stage 3A Moderate CKD (GFR = 45-59 mL/min/1 73 square meters)    Stage 3B Moderate CKD (GFR = 30-44 mL/min/1 73 square meters)    Stage 4 Severe CKD (GFR = 15-29 mL/min/1 73 square meters)    Stage 5 End Stage CKD (GFR <15 mL/min/1 73 square meters)  Note: GFR calculation is accurate only with a steady state creatinine    Protime-INR [[id number]]  (Normal) Collected:  09/09/20 0756    Lab Status:  Final result Specimen:  Blood from Arm, Left Updated:  09/09/20 0820     Protime 14 4 seconds      INR 1 13    APTT [[id number]]  (Normal) Collected:  09/09/20 0756    Lab Status:  Final result Specimen:  Blood from Arm, Left Updated:  09/09/20 0820     PTT 25 seconds     CBC and Platelet [[id number]]  (Abnormal) Collected:  09/09/20 0756    Lab Status:  Final result Specimen:  Blood from Arm, Left Updated:  09/09/20 0806     WBC 10 24 Thousand/uL      RBC 2 83 Million/uL      Hemoglobin 8 7 g/dL      Hematocrit 27 9 %      MCV 99 fL      MCH 30 7 pg      MCHC 31 2 g/dL      RDW 16 4 %      Platelets 361 Thousands/uL      MPV 10 4 fL                  CTA stroke alert (head/neck)   Final Result by Lucas Ellis MD (09/09 0071)      1  Patent major vasculature of [place] without critical stenosis  Atherosclerotic disease of intracranial internal carotid and vertebral arteries  2   No hemodynamically significant stenosis of major cervical vasculature  Patent bilateral cervical carotid artery stents  Findings were directly discussed with Asher Healy on 9/9/2020 7:50 AM                      Workstation performed: [id number]         CT stroke alert brain   Final Result by Lucas Ellis MD (09/09 6399)      1  No acute intracranial CT abnormality  2   Chronic white matter microangiopathy  Findings were directly discussed with Asher Healy on 9/9/2020 7:50 AM       Workstation performed: [id number]                    Procedures  ECG 12 Lead Documentation Only    Date/Time: 9/9/2020 9:07 AM  Performed by: Valentino Solomons, MD  Authorized by: Valentino Solomons, MD     Indications / Diagnosis:  Stroke workup  ECG reviewed by me, the ED Provider: yes    Patient location:  ED  Rate:     ECG rate:  90    ECG rate assessment: normal    Rhythm:     Rhythm: sinus rhythm    Ectopy:     Ectopy: none    QRS:     QRS axis:  Left    QRS intervals:   Wide  Conduction:     Conduction: normal    ST segments:     ST segments:  Normal  T waves:     T waves: normal    CriticalCare Time  Performed by: Valentino Solomons, MD  Authorized by: Valentino Solomons, MD     Critical care provider statement:     Critical care time (minutes):  60    Critical care time was exclusive of:  Separately billable procedures and treating other patients and teaching time    Critical care was necessary to treat or prevent imminent or life-threatening deterioration of the following conditions:  CNS failure or compromise    Critical care was time spent personally by me on the following activities:  Ordering and review of laboratory studies, ordering and review of radiographic studies, discussions with consultants, evaluation of patient's response to treatment and examination of patient  Comments:      Stroke alert  Consideration of tPA however outside of appropriate window  ED Course               Stroke Assessment     Row Name 09/09/20 0805             NIH Stroke Scale    Interval  Baseline      Level of Consciousness (1a )  0      LOC Questions (1b )  0      LOC Commands (1c )  0      Best Gaze (2 )  0      Visual (3 )  0      Facial Palsy (4 )  1      Motor Arm, Left (5a )  1      Motor Arm, Right (5b )  0      Motor Leg, Left (6a )  0      Motor Leg, Right (6b )  0      Limb Ataxia (7 )  0      Sensory (8 )  0      Best Language (9 )  0      Dysarthria (10 )  0      Extinction and Inattention (11 ) (Formerly Neglect)  0      Total  2          First Filed Value   TPA Decision  Patient not a TPA candidate  Patient is not a candidate options  Unclear time of onset outside appropriate time window  MDM  Number of Diagnoses or Management Options  Facial droop: new and requires workup  Weakness: new and requires workup  Diagnosis management comments: Left-sided weakness, facial droop  Last known normal 4:00 p m  NIH 2  Stroke alert initiated immediately upon patient's arrival upper normal blood sugar by EMS  CT with no hemorrhage or signs of acute arterial occlusion  Discussed with Dr Oanh Cooper who recommended full-dose aspirin, atorvastatin admission under the stroke pathway  Additional ER workup unremarkable  Patient with no history of active bleeding  Will admit under stroke pathway    Discussed with hospitalist, Dr Cristofer Carvalho and/or Complexity of Data Reviewed  Clinical lab tests: ordered and reviewed  Tests in the radiology section of CPT®: ordered and reviewed  Tests in the medicine section of CPT®: ordered and reviewed  Obtain history from someone other than the patient: yes  Discuss the patient with other providers: yes    Risk of Complications, Morbidity, and/or Mortality  Presenting problems: high  Diagnostic procedures: high  Management options: high    Patient Progress  Patient progress: stable      Disposition  Final diagnoses:   Weakness   Facial droop     Time reflects when diagnosis was documented in both MDM as applicable and the Disposition within this note     Time User Action Codes Description Comment    9/9/2020  8:59 AM Jude Roldan [R53 1] Weakness     9/9/2020  8:59 AM Jude Roldan [R29 810] Facial droop       ED Disposition     ED Disposition Condition Date/Time Comment    Admit Stable Wed Sep 9, 2020  8:59 AM Case was discussed with Dr Kathy Mcdonald and the patient's admission status was agreed to be Admission Status: observation status to the service of Dr Kathy Mcdonald   Follow-up Information    None         Patient's Medications   Discharge Prescriptions    No medications on file     No discharge procedures on file      PDMP Review     None          ED Provider  Electronically Signed by           Vik Coleman MD  09/09/20 9963

## 2020-09-09 NOTE — QUICK NOTE
Liz Kang is an 81 yo M that is brought with cc of left sided weakness  LSN at 4pm yesterday  Current NIHSS 2  TPA Decision: Patient not a TPA candidate  Unclear time of onset outside appropriate time window  Reason for Consult / Principal Problem: stroke like sxs  Hx and PE limited by: none  Patient last known well: 4pm 9/8/20  Stroke alert called: 7:30am  Neurology time of arrival: discussed case at 7:29am with ED staff    Admit under stroke pathway  Can give aspirin 325mg and lipitor 80mg

## 2020-09-09 NOTE — H&P
History and Physical - [facility]    Patient Information: Zaynab Bermudez 80 y o  male MRN: [id number]  Unit/Bed#: 9[address] [facility] 403-01 Encounter: [id number]  Admitting Physician: Jen Ferguson DO  PCP: Olesya Kwon DO  Date of Admission:  09/09/20        Hospital Problem List:     Principal Problem:    Left-sided weakness  Active Problems:    Essential hypertension    Dyslipidemia    Type 2 diabetes mellitus, without long-term current use of insulin ([facility] [address] )    Carotid disease, bilateral ([facility])      Assessment/Plan:    Carotid disease, bilateral ([address] )  Assessment & Plan  Status post bilateral carotid artery stent placement  Continue Plavix, high-dose Lipitor    Type 2 diabetes mellitus, without long-term current use of insulin ([facility] )  Assessment & Plan  Lab Results   Component Value Date    HGBA1C 7 3 (H) 11/08/2019       No results for input(s): POCGLU in the last 72 hours  Hold metformin  Place patient on Accu-Cheks with sliding scale insulin  Check A1c      Dyslipidemia  Assessment & Plan  Lipitor increased to 80 mg daily  Check lipid panel    Essential hypertension  Assessment & Plan  Hold antihypertensives to allow for permissive hypertension    * Left-sided weakness  Assessment & Plan  Patient presented with left-sided weakness along with left facial droop, status post fall at home   Admit to telemetry   CT of head revealed chronic changes   Continue full-dose ASA, Plavix and statin   Neuro checks    Lipid panel, HgA1C   MRI in AM   CTA head/neck showed 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   Echo with bubble study   Neuro eval            VTE Prophylaxis: Enoxaparin (Lovenox)  / sequential compression device   Code Status: Level 3 - DNAR and DNI    Anticipated Length of Stay:  Patient will be admitted on an Observation basis with an anticipated length of stay of 1 midnights     Justification for Hospital Stay: left sided weakness    Total Time for Visit, including Counseling / Coordination of Care: 45 minutes  Greater than 50% of this total time spent on direct patient counseling and coordination of care  Chief Complaint:     STROKE Alert (Patient arrives as stroke alert LKW 4pm yesterday  L sided weakness)    of Present Illness:    Franky Ingram is a 80 y o  male who presents with left-sided weakness  Patient is extremely hard of hearing and also not a great historian  Spoke with patient's daughter-in-law  As per daughter-in-law, patient had a fall this morning  He pressed his Life Alert button  As per  who is also a friend of the daughter-in-law, patient was already off the floor but was noted to have some slurring of speech  When EMS arrived, he was noted to have left-sided weakness and left facial droop as per daughter-in-law  last known normal was 4:00 p m  Yesterday    Review of Systems:  Difficult to do as patient is extremely hard of hearing and poor historian but also reviewed with patient's daughter-in-law    Review of Systems   Constitutional: Negative for appetite change, chills and fever  HENT: Positive for hearing loss  Negative for congestion and trouble swallowing  Eyes: Negative for photophobia and visual disturbance  Respiratory: Negative for chest tightness and shortness of breath  Cardiovascular: Negative for chest pain and leg swelling  Gastrointestinal: Negative for abdominal pain, diarrhea, nausea and vomiting  Genitourinary: Negative for dysuria, frequency and hematuria  Musculoskeletal: Negative for arthralgias  Skin: Negative for wound  Neurological: Positive for weakness  Negative for syncope and headaches  Hematological: Does not bruise/bleed easily  Psychiatric/Behavioral: Negative for agitation  Past Medical and Surgical History:     Past Medical History:   Diagnosis Date    Anemia     getting venofer once a week       Arthritis     Cancer ([facility] [address] )     NOSE    Coronary artery disease     carotid stents bilateral    Diabetes mellitus ([address] )     Hearing aid worn     BILATERAL    History of transfusion 10/11/2015    Hyperlipidemia     Hypertension     Spinal stenosis     hx of L-ROHAN       Past Surgical History:   Procedure Laterality Date    APPENDECTOMY      CAROTID STENT Bilateral     CATARACT EXTRACTION W/ INTRAOCULAR LENS IMPLANT Left 2013    CHOLECYSTECTOMY      CYSTOSCOPY W/ URETERAL STENT PLACEMENT Right     CYSTOSCOPY W/ URETERAL STENT REMOVAL      FEMORAL ARTERY STENT  2013    HEMORRHOID SURGERY      HERNIA REPAIR      HIP ARTHROPLASTY Left 2013    JOINT REPLACEMENT Left     [place] TOTAL HIP ARTHROPLASTY Right 2016    Procedure: ARTHROPLASTY HIP TOTAL;  Surgeon: Rhonda Ortiz MD;  Location: [address] [place] [address];  Service: Orthopedics    SKIN BIOPSY      removal of skin cancer nose    TONSILLECTOMY         Meds/Allergies:    PTA meds:   Prior to Admission Medications   Prescriptions Last Dose Informant Patient Reported? Taking? ALPRAZolam (XANAX) 0 25 mg tablet 2020 at Unknown time  Yes Yes   Sig: Take 0 25 mg by mouth 2 (two) times a day as needed for anxiety   Cholecalciferol (VITAMIN D) 2000 UNITS tablet 2020 at Unknown time  Yes Yes   Sig: Take 2,000 Units by mouth daily in the early morning  Cyanocobalamin (VITAMIN B 12 PO) 2020 at Unknown time  Yes Yes   Sig: Take 1,000 mg by mouth daily   Iron Sucrose (VENOFER IV) 2020 at Unknown time  Yes Yes   Sig: Infuse into a venous catheter  VASCEPA 1 g CAPS 2020 at Unknown time  Yes Yes   Sig: TAKE ONE CAPSULE BY MOUTH TWICE DAILY WITH FOOD SWALLOWING WHOLE  DO NOT CHEW, OPEN, DISSOVE AND/OR CRUSH  VITRON-C  MG TABS 2020 at Unknown time  Yes Yes   Si (two) times a day    atorvastatin (LIPITOR) 40 mg tablet 2020 at Unknown time  Yes Yes   Sig: Take 40 mg by mouth daily at bedtime     clopidogrel (PLAVIX) 75 mg tablet 2020 at Unknown time  Yes Yes Sig: Take 75 mg by mouth daily at bedtime  donepezil (ARICEPT) 10 mg tablet 2020 at Unknown time  No Yes   Sig: TAKE 1 TABLET (10 MG TOTAL) BY MOUTH DAILY AT BEDTIME STARTING AQ   folic acid (FOLVITE) 084 MCG tablet 2020 at Unknown time  Yes Yes   Sig: Take 400 mcg by mouth daily in the early morning  metFORMIN (GLUCOPHAGE) 500 mg tablet 2020 at Unknown time  Yes Yes   Sig: Take 1,000 mg by mouth 2 (two) times a day with meals    tamsulosin (FLOMAX) 0 4 mg 2020 at Unknown time  Yes Yes   Si 4 mg daily with dinner    valsartan (DIOVAN) 80 mg tablet 2020 at Unknown time  Yes Yes   Sig: Take 80 mg by mouth daily in the early morning  Facility-Administered Medications: None       Allergies: Allergies   Allergen Reactions    Other Swelling     Bee stings     History:     Marital Status:      Substance Use History:   Social History     Substance and Sexual Activity   Alcohol Use Yes    Frequency: Monthly or less    Comment: occ     Social History     Tobacco Use   Smoking Status Former Smoker    Packs/day: 1 50    Years: 20 00    Pack years: 30 00    Last attempt to quit: 1996    Years since quittin 6   Smokeless Tobacco Never Used     Social History     Substance and Sexual Activity   Drug Use No       Family History:    History reviewed  No pertinent family history  Physical Exam:     Vitals:   Blood Pressure: 124/62 (20 1257)  Pulse: 84 (20 1257)  Temperature: 97 5 °F (36 4 °C) (20 1257)  Temp Source: Tympanic (20 1300)  Respirations: 18 (20 1300)  Height: 6' (182 9 cm) (20 1300)  SpO2: 98 % (20 1257)    Physical Exam  Constitutional:       General: He is not in acute distress  Appearance: He is well-developed  He is not diaphoretic  HENT:      Head: Normocephalic and atraumatic  Comments: Extremely hard of hearing  Eyes:      General: No scleral icterus  Right eye: No discharge  Left eye: No discharge  Cardiovascular:      Rate and Rhythm: Normal rate and regular rhythm  Pulmonary:      Effort: Pulmonary effort is normal  No respiratory distress  Breath sounds: Normal breath sounds  No wheezing or rales  Abdominal:      General: Bowel sounds are normal  There is no distension  Palpations: Abdomen is soft  Tenderness: There is no abdominal tenderness  Musculoskeletal:      Right lower leg: No edema  Left lower leg: No edema  Neurological:      Mental Status: He is alert  Comments: Oriented to self and place  Did not know the month  Initially got the year wrong but was then able to correct himself  Left facial droop noted  Left upper and lower extremities slightly weaker compared to right                 Lab Results: I have personally reviewed pertinent reports  Results from last 7 days   Lab Units 09/09/20  0756   WBC Thousand/uL 10 24*   HEMOGLOBIN g/dL 8 7*   HEMATOCRIT % 27 9*   PLATELETS Thousands/uL 208     Results from last 7 days   Lab Units 09/09/20  0756   POTASSIUM mmol/L 4 1   CHLORIDE mmol/L 107   CO2 mmol/L 26   BUN mg/dL 19   CREATININE mg/dL 1 11   CALCIUM mg/dL 8 2*     Results from last 7 days   Lab Units 09/09/20  0756   INR  1 13       Imaging: I have personally reviewed pertinent reports  Ct Stroke Alert Brain    Result Date: 9/9/2020  Narrative: CT BRAIN - STROKE ALERT PROTOCOL INDICATION:   Left arm weakness  COMPARISON:  Brain MRI 7/2/2019 TECHNIQUE:  CT examination of the brain was performed  In addition to axial images, coronal reformatted images were created and submitted for interpretation  Radiation dose length product (DLP) for this visit:  943 51 mGy-cm   This examination, like all CT scans performed in the [facility], was performed utilizing techniques to minimize radiation dose exposure, including the use of iterative  reconstruction and automated exposure control  IMAGE QUALITY:  Diagnostic  FINDINGS:  PARENCHYMA: No acute intracranial hemorrhage  No masslike lesion, mass effect or midline shift  No CT evidence for acute infarct  Cerebral atrophy  Nonspecific decreased attenuation involving periventricular and subcortical white matter, most consistent with mild to moderate microangiopathic changes  VENTRICLES AND EXTRA-AXIAL SPACES:  Normal for patient's age  VISUALIZED ORBITS AND PARANASAL SINUSES:  Unremarkable  CALVARIUM AND EXTRACRANIAL SOFT TISSUES:   Normal      Impression: 1  No acute intracranial CT abnormality  2   Chronic white matter microangiopathy  Findings were directly discussed with Doris Mota on 9/9/2020 7:50 AM  Workstation performed: [id number]     Cta Stroke Alert (head/neck)    Result Date: 9/9/2020  Narrative: CTA NECK AND BRAIN WITH CONTRAST INDICATION: Left arm weakness COMPARISON:   Brain MRI 7/2/2019 TECHNIQUE:   Post contrast imaging was performed after administration of iodinated contrast through the neck and brain  Post contrast axial 0 625 mm images timed to opacify the arterial system  3D rendering was performed on an independent workstation  MIP reconstructions performed  Coronal reconstructions were performed of the noncontrast portion of the brain  Radiation dose length product (DLP) for this visit:  632 86 mGy-cm   This examination, like all CT scans performed in the [place], was performed utilizing techniques to minimize radiation dose exposure, including the use of iterative  reconstruction and automated exposure control  IV Contrast:  IMAGE QUALITY:   Diagnostic FINDINGS: CERVICAL VASCULATURE AORTIC ARCH AND GREAT VESSELS: Atherosclerotic calcification of aortic arch  Great vessel origins are patent without significant stenosis  RIGHT VERTEBRAL ARTERY CERVICAL SEGMENT:Moderate atherosclerotic narrowing at the origin  The vessel is patent throughout the neck without significant stenosis   LEFT VERTEBRAL ARTERY CERVICAL SEGMENT: Mild atherosclerotic narrowing at the origin  The vessel is patent throughout the neck without significant stenosis  RIGHT EXTRACRANIAL CAROTID SEGMENT: Patent stent from distal common carotid artery into proximal cervical ICA  No significant stenosis  LEFT EXTRACRANIAL CAROTID SEGMENT: Patent stent from distal common carotid artery into proximal cervical ICA  No significant stenosis  NASCET criteria was used to determine the degree of internal carotid artery diameter stenosis  INTRACRANIAL VASCULATURE INTERNAL CAROTID ARTERIES: Mild atherosclerotic disease of cavernous and supraclinoid segments of bilateral internal carotid arteries without critical stenosis  Normal ophthalmic artery origins  A 1 5 mm left P-comm origin infundibulum  ANTERIOR CIRCULATION:  Normal A1 segments  Bilateral anterior cerebral arteries are unremarkable  Normal anterior comminuting artery  MIDDLE CEREBRAL ARTERY CIRCULATION:  Bilateral M1 segments and major M2 branches are patent without significant stenosis  DISTAL VERTEBRAL ARTERIES:  There is mild atherosclerotic disease of intracranial vertebral arteries  Posterior inferior cerebellar artery origins are patent  BASILAR ARTERY:  Basilar artery is normal in caliber  Normal superior cerebellar arteries  POSTERIOR CEREBRAL ARTERIES: Bilateral posterior cerebral arteries are unremarkable without critical stenosis  Absent/hypoplastic right posterior communicating artery  DURAL VENOUS SINUSES:  Unremarkable  NON VASCULAR ANATOMY BONY STRUCTURES: No acute or aggressive appearing osseous abnormality  SOFT TISSUES OF THE NECK:  Unremarkable  THORACIC INLET:  Unremarkable  Impression: 1  Patent major vasculature of [place] without critical stenosis  Atherosclerotic disease of intracranial internal carotid and vertebral arteries  2   No hemodynamically significant stenosis of major cervical vasculature  Patent bilateral cervical carotid artery stents     Findings were directly discussed with Kailash Salmeron on 9/9/2020 7:50 AM  Workstation performed: [id number]       CTA stroke alert (head/neck)   Final Result      1  Patent major vasculature of [place] without critical stenosis  Atherosclerotic disease of intracranial internal carotid and vertebral arteries  2   No hemodynamically significant stenosis of major cervical vasculature  Patent bilateral cervical carotid artery stents  Findings were directly discussed with Kailash Salmeron on 9/9/2020 7:50 AM                      Workstation performed: [id number]         CT stroke alert brain   Final Result      1  No acute intracranial CT abnormality  2   Chronic white matter microangiopathy  Findings were directly discussed with Kailash Salmeron on 9/9/2020 7:50 AM       Workstation performed: [id number]             EKG, Pathology, and Other Studies Reviewed on Admission:   · EKG shows sinus rhythm with no acute ST elevation    Allscripts/EPIC Records Reviewed: Yes     ** Please Note: "This note has been constructed using a voice recognition system  Therefore there may be syntax, spelling, and/or grammatical errors   Please call if you have any questions  "**

## 2020-09-10 ENCOUNTER — APPOINTMENT (OUTPATIENT)
Dept: RADIOLOGY | Facility: HOSPITAL | Age: 85
DRG: 378 | End: 2020-09-10
Payer: MEDICARE

## 2020-09-10 ENCOUNTER — APPOINTMENT (OUTPATIENT)
Dept: NON INVASIVE DIAGNOSTICS | Facility: HOSPITAL | Age: 85
DRG: 378 | End: 2020-09-10
Payer: MEDICARE

## 2020-09-10 PROBLEM — D50.9 IDA (IRON DEFICIENCY ANEMIA): Status: ACTIVE | Noted: 2020-09-10

## 2020-09-10 PROBLEM — K62.5 RECTAL BLEEDING: Status: ACTIVE | Noted: 2020-09-10

## 2020-09-10 PROBLEM — D62 ACUTE BLOOD LOSS ANEMIA: Status: ACTIVE | Noted: 2020-09-10

## 2020-09-10 LAB
ABO GROUP BLD: NORMAL
ANION GAP SERPL CALCULATED.3IONS-SCNC: 9 MMOL/L (ref 4–13)
BLD GP AB SCN SERPL QL: NEGATIVE
BUN SERPL-MCNC: 16 MG/DL (ref 5–25)
CALCIUM SERPL-MCNC: 7.7 MG/DL (ref 8.3–10.1)
CHLORIDE SERPL-SCNC: 106 MMOL/L (ref 100–108)
CO2 SERPL-SCNC: 27 MMOL/L (ref 21–32)
CREAT SERPL-MCNC: 0.97 MG/DL (ref 0.6–1.3)
ERYTHROCYTE [DISTWIDTH] IN BLOOD BY AUTOMATED COUNT: 16 % (ref 11.6–15.1)
EST. AVERAGE GLUCOSE BLD GHB EST-MCNC: 123 MG/DL
GFR SERPL CREATININE-BSD FRML MDRD: 69 ML/MIN/1.73SQ M
GLUCOSE P FAST SERPL-MCNC: 82 MG/DL (ref 65–99)
GLUCOSE SERPL-MCNC: 124 MG/DL (ref 65–140)
GLUCOSE SERPL-MCNC: 152 MG/DL (ref 65–140)
GLUCOSE SERPL-MCNC: 152 MG/DL (ref 65–140)
GLUCOSE SERPL-MCNC: 182 MG/DL (ref 65–140)
GLUCOSE SERPL-MCNC: 199 MG/DL (ref 65–140)
GLUCOSE SERPL-MCNC: 82 MG/DL (ref 65–140)
GLUCOSE SERPL-MCNC: 93 MG/DL (ref 65–140)
HBA1C MFR BLD: 5.9 %
HCT VFR BLD AUTO: 27.2 % (ref 36.5–49.3)
HCT VFR BLD AUTO: 27.7 % (ref 36.5–49.3)
HGB BLD-MCNC: 7.8 G/DL (ref 12–17)
HGB BLD-MCNC: 8.5 G/DL (ref 12–17)
HGB BLD-MCNC: 8.8 G/DL (ref 12–17)
MAGNESIUM SERPL-MCNC: 1.5 MG/DL (ref 1.6–2.6)
MCH RBC QN AUTO: 30.5 PG (ref 26.8–34.3)
MCHC RBC AUTO-ENTMCNC: 31.3 G/DL (ref 31.4–37.4)
MCV RBC AUTO: 98 FL (ref 82–98)
PLATELET # BLD AUTO: 203 THOUSANDS/UL (ref 149–390)
PMV BLD AUTO: 10.8 FL (ref 8.9–12.7)
POTASSIUM SERPL-SCNC: 4 MMOL/L (ref 3.5–5.3)
RBC # BLD AUTO: 2.79 MILLION/UL (ref 3.88–5.62)
RH BLD: POSITIVE
SARS-COV-2 RNA RESP QL NAA+PROBE: NEGATIVE
SODIUM SERPL-SCNC: 142 MMOL/L (ref 136–145)
SPECIMEN EXPIRATION DATE: NORMAL
WBC # BLD AUTO: 8.85 THOUSAND/UL (ref 4.31–10.16)

## 2020-09-10 PROCEDURE — 86850 RBC ANTIBODY SCREEN: CPT | Performed by: FAMILY MEDICINE

## 2020-09-10 PROCEDURE — 97163 PT EVAL HIGH COMPLEX 45 MIN: CPT

## 2020-09-10 PROCEDURE — 82948 REAGENT STRIP/BLOOD GLUCOSE: CPT

## 2020-09-10 PROCEDURE — 97110 THERAPEUTIC EXERCISES: CPT

## 2020-09-10 PROCEDURE — 86900 BLOOD TYPING SEROLOGIC ABO: CPT | Performed by: FAMILY MEDICINE

## 2020-09-10 PROCEDURE — G1004 CDSM NDSC: HCPCS

## 2020-09-10 PROCEDURE — 86923 COMPATIBILITY TEST ELECTRIC: CPT

## 2020-09-10 PROCEDURE — 85018 HEMOGLOBIN: CPT | Performed by: PHYSICIAN ASSISTANT

## 2020-09-10 PROCEDURE — 85014 HEMATOCRIT: CPT | Performed by: FAMILY MEDICINE

## 2020-09-10 PROCEDURE — C9113 INJ PANTOPRAZOLE SODIUM, VIA: HCPCS | Performed by: PHYSICIAN ASSISTANT

## 2020-09-10 PROCEDURE — 86920 COMPATIBILITY TEST SPIN: CPT

## 2020-09-10 PROCEDURE — 87635 SARS-COV-2 COVID-19 AMP PRB: CPT | Performed by: PHYSICIAN ASSISTANT

## 2020-09-10 PROCEDURE — P9016 RBC LEUKOCYTES REDUCED: HCPCS

## 2020-09-10 PROCEDURE — 92523 SPEECH SOUND LANG COMPREHEN: CPT

## 2020-09-10 PROCEDURE — 85027 COMPLETE CBC AUTOMATED: CPT | Performed by: FAMILY MEDICINE

## 2020-09-10 PROCEDURE — 70551 MRI BRAIN STEM W/O DYE: CPT

## 2020-09-10 PROCEDURE — 80048 BASIC METABOLIC PNL TOTAL CA: CPT | Performed by: FAMILY MEDICINE

## 2020-09-10 PROCEDURE — 93306 TTE W/DOPPLER COMPLETE: CPT

## 2020-09-10 PROCEDURE — 99214 OFFICE O/P EST MOD 30 MIN: CPT | Performed by: PSYCHIATRY & NEUROLOGY

## 2020-09-10 PROCEDURE — 85018 HEMOGLOBIN: CPT | Performed by: FAMILY MEDICINE

## 2020-09-10 PROCEDURE — 97166 OT EVAL MOD COMPLEX 45 MIN: CPT

## 2020-09-10 PROCEDURE — 99232 SBSQ HOSP IP/OBS MODERATE 35: CPT | Performed by: FAMILY MEDICINE

## 2020-09-10 PROCEDURE — 99291 CRITICAL CARE FIRST HOUR: CPT | Performed by: PHYSICIAN ASSISTANT

## 2020-09-10 PROCEDURE — 87081 CULTURE SCREEN ONLY: CPT | Performed by: INTERNAL MEDICINE

## 2020-09-10 PROCEDURE — 83735 ASSAY OF MAGNESIUM: CPT | Performed by: FAMILY MEDICINE

## 2020-09-10 PROCEDURE — 93306 TTE W/DOPPLER COMPLETE: CPT | Performed by: INTERNAL MEDICINE

## 2020-09-10 PROCEDURE — 99223 1ST HOSP IP/OBS HIGH 75: CPT | Performed by: INTERNAL MEDICINE

## 2020-09-10 PROCEDURE — 97530 THERAPEUTIC ACTIVITIES: CPT

## 2020-09-10 PROCEDURE — 86901 BLOOD TYPING SEROLOGIC RH(D): CPT | Performed by: FAMILY MEDICINE

## 2020-09-10 PROCEDURE — 83036 HEMOGLOBIN GLYCOSYLATED A1C: CPT | Performed by: FAMILY MEDICINE

## 2020-09-10 RX ORDER — ONDANSETRON 2 MG/ML
4 INJECTION INTRAMUSCULAR; INTRAVENOUS EVERY 6 HOURS PRN
Status: DISCONTINUED | OUTPATIENT
Start: 2020-09-10 | End: 2020-09-18 | Stop reason: HOSPADM

## 2020-09-10 RX ORDER — PANTOPRAZOLE SODIUM 40 MG/1
40 INJECTION, POWDER, FOR SOLUTION INTRAVENOUS EVERY 12 HOURS SCHEDULED
Status: DISCONTINUED | OUTPATIENT
Start: 2020-09-10 | End: 2020-09-18 | Stop reason: HOSPADM

## 2020-09-10 RX ORDER — ATORVASTATIN CALCIUM 40 MG/1
40 TABLET, FILM COATED ORAL
Status: DISCONTINUED | OUTPATIENT
Start: 2020-09-11 | End: 2020-09-18 | Stop reason: HOSPADM

## 2020-09-10 RX ORDER — ACETAMINOPHEN 325 MG/1
650 TABLET ORAL ONCE
Status: DISCONTINUED | OUTPATIENT
Start: 2020-09-10 | End: 2020-09-10

## 2020-09-10 RX ORDER — SODIUM CHLORIDE 9 MG/ML
100 INJECTION, SOLUTION INTRAVENOUS CONTINUOUS
Status: DISCONTINUED | OUTPATIENT
Start: 2020-09-10 | End: 2020-09-10

## 2020-09-10 RX ORDER — MAGNESIUM SULFATE HEPTAHYDRATE 40 MG/ML
2 INJECTION, SOLUTION INTRAVENOUS ONCE
Status: COMPLETED | OUTPATIENT
Start: 2020-09-10 | End: 2020-09-10

## 2020-09-10 RX ORDER — POLYETHYLENE GLYCOL 3350 17 G/17G
238 POWDER, FOR SOLUTION ORAL ONCE
Status: COMPLETED | OUTPATIENT
Start: 2020-09-10 | End: 2020-09-10

## 2020-09-10 RX ADMIN — POLYETHYLENE GLYCOL 3350 238 G: 17 POWDER, FOR SOLUTION ORAL at 18:34

## 2020-09-10 RX ADMIN — INSULIN LISPRO 1 UNITS: 100 INJECTION, SOLUTION INTRAVENOUS; SUBCUTANEOUS at 21:47

## 2020-09-10 RX ADMIN — ATORVASTATIN CALCIUM 80 MG: 80 TABLET, FILM COATED ORAL at 17:21

## 2020-09-10 RX ADMIN — Medication 400 MCG: at 05:24

## 2020-09-10 RX ADMIN — PANTOPRAZOLE SODIUM 40 MG: 40 INJECTION, POWDER, FOR SOLUTION INTRAVENOUS at 21:46

## 2020-09-10 RX ADMIN — MAGNESIUM SULFATE HEPTAHYDRATE 2 G: 40 INJECTION, SOLUTION INTRAVENOUS at 11:18

## 2020-09-10 RX ADMIN — BISACODYL 10 MG: 5 TABLET, COATED ORAL at 17:21

## 2020-09-10 RX ADMIN — INSULIN LISPRO 1 UNITS: 100 INJECTION, SOLUTION INTRAVENOUS; SUBCUTANEOUS at 11:18

## 2020-09-10 RX ADMIN — DONEPEZIL HYDROCHLORIDE 10 MG: 5 TABLET ORAL at 21:46

## 2020-09-10 RX ADMIN — SODIUM CHLORIDE 1000 ML: 0.9 INJECTION, SOLUTION INTRAVENOUS at 19:42

## 2020-09-10 RX ADMIN — ENOXAPARIN SODIUM 40 MG: 40 INJECTION SUBCUTANEOUS at 09:40

## 2020-09-10 RX ADMIN — INSULIN LISPRO 1 UNITS: 100 INJECTION, SOLUTION INTRAVENOUS; SUBCUTANEOUS at 17:21

## 2020-09-10 RX ADMIN — SODIUM CHLORIDE 75 ML/HR: 0.9 INJECTION, SOLUTION INTRAVENOUS at 18:34

## 2020-09-10 RX ADMIN — ASPIRIN 325 MG: 325 TABLET ORAL at 09:40

## 2020-09-10 RX ADMIN — VITAMIN D, TAB 1000IU (100/BT) 2000 UNITS: 25 TAB at 05:24

## 2020-09-10 RX ADMIN — TAMSULOSIN HYDROCHLORIDE 0.4 MG: 0.4 CAPSULE ORAL at 17:21

## 2020-09-10 RX ADMIN — SODIUM CHLORIDE 500 ML: 0.9 INJECTION, SOLUTION INTRAVENOUS at 19:00

## 2020-09-10 NOTE — PROGRESS NOTES
09/10/20 1431   Patient Information   Mental Status Alert  (Hard of hearing w/some memory deficiets)   Primary Caregiver Self   Accompanied by/Relationship The assessment was completed via phone w/Mala (Daughter In-Law) with the Pt's permission  According to Melba Chappell (Daughter In-Law),Ruben Talbot,  is the [address] [place] [address] is the pharmacy of choice and Pt's insurance covers his prescriptions  A history of mental health/substance abuse was denied  The Pt does not have a Hx of [facility], however, was inpatient 5+ years ago at [facility] S/P 2 hip replacements  D/C planning discussed and options counseling was reviewed concerning [place] [address] services upon D/C  Rehab therapy recommended PT/OT in the home  At the request of the DIL, she asked that a referral be sent to the [place] [address] providers in the area  CM will submit those requests and F/U with the Pt and DIL once a response is received  No further actions required at this time  CM will con't to F/U through D/C  Support System Immediate family  Erika Calabrese (Daughter In-Law) and Aaron Armijo (grandson))   Legal Information   Advance Directives Power of  for health care; Power of [employer] for [address] Proxy Appointed Yes - [address] Proxy section   Activities of Daily Living Prior to Admission   Functional Status Independent   Assistive Device Bulmaro Lai  (uses a rollator walker to ambulate )   Living Arrangement House  (raised ranch w/ 10 [address] and handrails )   Ambulation Independent  (According to Melba Chappell (Daughter In-Law), walker; rollator, bed bars, grab bar, and shower chair are all present in the home )   Income Information   Income Source SSI/SSD   [employer] of Transport to [facility]: Drive Self  (According to Melba Chappell (Daughter In-Law/DIL), until 2 weeks ago the Pt drove himself, however, his license has since been ordered to be revoked by MD request  To date, the DIL and grandson will transport the patient to and from appts )

## 2020-09-10 NOTE — PROGRESS NOTES
Dr Silvana Jerez informed of blood present in patient's stool  H/H ordered  Aspirin and Lovenox discontinued

## 2020-09-10 NOTE — PLAN OF CARE
Pt  Will participate in language activities to increase cognition, memory, and problem solving skills for least restrictive environment  Pt  Will complete further evaluation of language skills to determine pt's current status

## 2020-09-10 NOTE — PROGRESS NOTES
[facility] Progress Note  Patient: Amena Baptiste 80 y o  male   MRN: [id number]  PCP: Nanci Angela DO  Unit/Bed#: [facility] [facility] Encounter: [id number]  Date Of Visit: 09/10/20    Problem List:    Principal Problem:    Left-sided weakness  Active Problems:    Rectal bleeding    Essential hypertension    Dyslipidemia    Type 2 diabetes mellitus, without long-term current use of insulin (HCC)    Carotid disease, bilateral (HCC)    MIKE (iron deficiency anemia)      Assessment & Plan:    * Left-sided weakness  Assessment & Plan  Patient presented with left-sided weakness along with left facial droop, status post fall at home   CT of head revealed chronic changes   MRI negative for acute stroke  Symptoms likely TIA   Discontinue full-dose ASA as per Neurology  Hold Plavix for now due to GI bleed as benefits outweigh the risk of stroke  Continue statin   He was on neuro checks    Lipid panel - LDL 26, HgA1C 5 9   CTA head/neck showed 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   Echo with bubble study showed EF of 55-60% with grade 1 diastolic dysfunction  No ASD or PFO noted   Neuro eval appreciated      Rectal bleeding  Assessment & Plan  Patient had 2 episodes of rectal bleeding as per RN  Repeat H/H was done in the afternoon and hemoglobin is currently stable at 8 8  Repeat lab work later today and again in a m  Case discussed with GI and also patient's daughter-in-law  Plan is for EGD and colonoscopy tomorrow for further evaluation and patient is agreeable  Started on IV Protonix b i d     MIKE (iron deficiency anemia)  Assessment & Plan  Patient gets IV iron infusions once per month by his hematologist    Carotid disease, bilateral [facility])  Assessment & Plan  Status post bilateral carotid artery stent placement  Continue high-dose Lipitor   Holding plavix    Type 2 diabetes mellitus, without long-term current use of insulin (HCC)  Assessment & Plan  Lab Results   Component Value Date    HGBA1C 5 9 (H) 09/10/2020       Recent Labs     20  2033 09/10/20  0704 09/10/20  1113 09/10/20  1641   POCGLU 133 93 152* 182*       Holding metformin  Place patient on Accu-Cheks with sliding scale insulin      Dyslipidemia  Assessment & Plan  Lipitor increased to 80 mg daily  Essential hypertension  Assessment & Plan  Initially antihypertensives held to allow for permissive hypertension but will continue to hold due to soft blood pressures        VTE Pharmacologic Prophylaxis:   Pharmacologic: Pharmacologic VTE Prophylaxis contraindicated due to GI bleed  Mechanical VTE Prophylaxis in Place: Yes    Patient Centered Rounds: I have performed bedside rounds with nursing staff today  Discussions with Specialists or Other Care Team Provider: yes - GI, neuro    Education and Discussions with Family / Patient: yes - daughter in law    Time Spent for Care: 35 min  More than 50% of total time spent on counseling and coordination of care as described above  Current Length of Stay: 0 day(s)    Current Patient Status: Inpatient   Certification Statement: The patient, admitted on an observation basis, will now require > 2 midnight hospital stay due to GI bleed requiring colonoscopy and endoscopy for further evaluation    Discharge Plan: home    Code Status: Level 3 - DNAR and DNI      Subjective:   Patient states he is doing okay  Denies any abdominal pain, chest pain, shortness of breath, lightheadedness  Objective:     Vitals:   Temp (24hrs), Av 8 °F (36 6 °C), Min:97 6 °F (36 4 °C), Max:98 4 °F (36 9 °C)    Temp:  [97 6 °F (36 4 °C)-98 4 °F (36 9 °C)] 97 6 °F (36 4 °C)  HR:  [] 107  Resp:  [16-19] 16  BP: (100-141)/(58-74) 100/58  SpO2:  [96 %-100 %] 99 %  Body mass index is 23 92 kg/m²  Input and Output Summary (last 24 hours):        Intake/Output Summary (Last 24 hours) at 9/10/2020 1522  Last data filed at 9/10/2020 1445  Gross per 24 hour Intake    Output 1050 ml   Net -1050 ml       Physical Exam:     Physical Exam  Constitutional:       General: He is not in acute distress  Appearance: He is well-developed  He is not diaphoretic  HENT:      Head: Normocephalic and atraumatic  Ears:      Comments: Extremely Hard of hearing  Eyes:      General: No scleral icterus  Right eye: No discharge  Left eye: No discharge  Cardiovascular:      Rate and Rhythm: Normal rate and regular rhythm  Pulmonary:      Effort: Pulmonary effort is normal  No respiratory distress  Breath sounds: Normal breath sounds  No wheezing or rales  Abdominal:      General: Bowel sounds are normal  There is no distension  Palpations: Abdomen is soft  Tenderness: There is no abdominal tenderness  Musculoskeletal:      Right lower leg: No edema  Left lower leg: No edema  Neurological:      General: No focal deficit present  Mental Status: He is alert and oriented to person, place, and time  Comments: Left facial droop  Left upper extremity strength intact  Left lower extremity only slightly weaker compared to the right  Oriented to self and place and knew what year it is         Additional Data:     Labs:    Results from last 7 days   Lab Units 09/10/20  1458 09/10/20  0536   WBC Thousand/uL  --  8 85   HEMOGLOBIN g/dL 8 8* 8 5*   HEMATOCRIT % 27 7* 27 2*   PLATELETS Thousands/uL  --  203     Results from last 7 days   Lab Units 09/10/20  0536   POTASSIUM mmol/L 4 0   CHLORIDE mmol/L 106   CO2 mmol/L 27   BUN mg/dL 16   CREATININE mg/dL 0 97   CALCIUM mg/dL 7 7*     Results from last 7 days   Lab Units 09/09/20  0756   INR  1 13       * I Have Reviewed All Lab Data Listed Above  * Additional Pertinent Lab Tests Reviewed:  All Labs For Current Hospital Admission Reviewed      Imaging:  Imaging Reports Reviewed Today Include: MRI brain, echo  Imaging Personally Reviewed by Myself Includes:  N/A    Recent Cultures (last 7 days):           Last 24 Hours Medication List:   Current Facility-Administered Medications   Medication Dose Route Frequency Provider Last Rate    atorvastatin  80 mg Oral Daily With Neftaly-Khoi Revankar, DO      cholecalciferol  2,000 Units Oral Early Morning Gin Revankar, DO      donepezil  10 mg Oral HS Gin Revankar, DO      folic acid  034 mcg Oral Early Morning Gin Revankar, DO      insulin lispro  1-5 Units Subcutaneous TID AC Gin Revankar, DO      insulin lispro  1-5 Units Subcutaneous HS Gin Revankar, DO      pantoprazole  40 mg Intravenous Q12H [facility] Gin Revankar, DO      tamsulosin  0 4 mg Oral Daily With [place]-Khoi Savanna DO            Today, Patient Was Seen By: Donn Israel DO    ** Please Note: "This note has been constructed using a voice recognition system  Therefore there may be syntax, spelling, and/or grammatical errors   Please call if you have any questions  "**

## 2020-09-10 NOTE — ASSESSMENT & PLAN NOTE
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

## 2020-09-10 NOTE — PROGRESS NOTES
Dr Jessica Thapa called and made aware of low BP and current blood loss  Labs and blood to be ordered

## 2020-09-10 NOTE — CONSULTS
Consultation - [facility]  Carmella Last 80 y o  male MRN: [id number]  Unit/Bed#: [address] [place] [address] Encounter: [id number]         Reason for Consult / Principal Problem:  Chronic iron deficiency anemia, hematochezia    HPI:  Stefani Rick is an 80-year-old male with history of hypertension, type 2 diabetes, hyperlipidemia, and carotid stenosis status post stent placement on Plavix  Patient presented to the emergency room yesterday with left-sided weakness  Patient on aspirin 325 mg and Plavix  Patient was nearing discharge today  MRI of the brain is pending  His original CT and CTA of the head and neck or without acute findings  GI was consulted as the patient was having hematochezia about 2 episodes starting this afternoon  Most recent hemoglobin 8 8, which is stable since yesterday  I was able to speak with patient's daughter-in-law over the phone, who states that the patient receives IV Venofer as an outpatient as he has a chronic history of iron deficiency anemia  Patient was seen examined at the bedside  He reports that he has a slight lower abdominal discomfort, otherwise he has no other complaints  He reports that he has had intermittent rectal bleeding at home  He denies any severe constipation as an inpatient  Fortunately, we do have his last colonoscopy report from 2015  It was noted that the patient had 2 polyps removed, sigmoid diverticulosis and internal hemorrhoids  I could not find a previous EGD report, but was reported that he may have had 1 in 2013  Review of Systems:    CONSTITUTIONAL: Denies any fever, chills, or rigors  Good appetite, and no recent weight loss  HEENT: No earache or tinnitus  Denies hearing loss or visual disturbances  CARDIOVASCULAR: No chest pain or palpitations  RESPIRATORY: Denies any cough, hemoptysis, shortness of breath or dyspnea on exertion  GASTROINTESTINAL: As noted in the History of Present Illness     GENITOURINARY: No problems with urination  Denies any hematuria or dysuria  NEUROLOGIC: No dizziness or vertigo, denies headaches  MUSCULOSKELETAL: Denies any muscle or joint pain  SKIN: Denies skin rashes or itching  ENDOCRINE: Denies excessive thirst  Denies intolerance to heat or cold  PSYCHOSOCIAL: Denies depression or anxiety  Denies any recent memory loss  Historical Information   Past Medical History:   Diagnosis Date    Anemia     getting venofer once a week   Arthritis     Cancer ([facility] )     NOSE    Coronary artery disease     carotid stents bilateral    Diabetes mellitus ([facility] )     Hearing aid worn     BILATERAL    History of transfusion 10/11/2015    Hyperlipidemia     Hypertension     Spinal stenosis     hx of L-ROHAN     Past Surgical History:   Procedure Laterality Date    APPENDECTOMY      CAROTID STENT Bilateral     CATARACT EXTRACTION W/ INTRAOCULAR LENS IMPLANT Left 2013    CHOLECYSTECTOMY      CYSTOSCOPY W/ URETERAL STENT PLACEMENT Right     CYSTOSCOPY W/ URETERAL STENT REMOVAL      FEMORAL ARTERY STENT  2013    HEMORRHOID SURGERY      HERNIA REPAIR      HIP ARTHROPLASTY Left 2013    JOINT REPLACEMENT Left     HI TOTAL HIP ARTHROPLASTY Right 2016    Procedure: ARTHROPLASTY HIP TOTAL;  Surgeon: Becky Oconnor MD;  Location: [address] [place] [address];  Service: Orthopedics    SKIN BIOPSY      removal of skin cancer nose    TONSILLECTOMY       Social History   Social History     Substance and Sexual Activity   Alcohol Use Yes    Frequency: Monthly or less    Comment: occ     Social History     Substance and Sexual Activity   Drug Use No     Social History     Tobacco Use   Smoking Status Former Smoker    Packs/day: 1 50    Years: 20 00    Pack years: 30 00    Last attempt to quit: 1996    Years since quittin 6   Smokeless Tobacco Never Used     History reviewed  No pertinent family history       Meds/Allergies     Current Facility-Administered Medications   Medication Dose Route Frequency    atorvastatin (LIPITOR) tablet 80 mg  80 mg Oral Daily With Dinner    cholecalciferol (VITAMIN D3) tablet 2,000 Units  2,000 Units Oral Early Morning    donepezil (ARICEPT) tablet 10 mg  10 mg Oral HS    folic acid (FOLVITE) tablet 400 mcg  400 mcg Oral Early Morning    insulin lispro (HumaLOG) 100 units/mL subcutaneous injection 1-5 Units  1-5 Units Subcutaneous TID AC    insulin lispro (HumaLOG) 100 units/mL subcutaneous injection 1-5 Units  1-5 Units Subcutaneous HS    pantoprazole (PROTONIX) injection 40 mg  40 mg Intravenous Q12H [address]    tamsulosin (FLOMAX) capsule 0 4 mg  0 4 mg Oral Daily With Dinner       Allergies   Allergen Reactions    Other Swelling     Bee stings         Objective     Blood pressure 98/60, pulse 97, temperature 97 6 °F (36 4 °C), temperature source Oral, resp  rate 18, height 6' (1 829 m), weight 80 kg (176 lb 5 9 oz), SpO2 100 %  Intake/Output Summary (Last 24 hours) at 9/10/2020 1536  Last data filed at 9/10/2020 1445  Gross per 24 hour   Intake    Output 1050 ml   Net -1050 ml         PHYSICAL EXAM:      General Appearance:   Alert and oriented x 3  Cooperative, and in no respiratory distress   HEENT:   Normocephalic, atraumatic, anicteric      Neck:  Supple, symmetrical, trachea midline   Lungs:   Clear to auscultation bilaterally; no rales, rhonchi or wheezing; respirations unlabored    Heart[de-identified]   S1 and S2 normal; regular rate and rhythm; no murmur, rub, or gallop     Abdomen:   Soft, non-tender, non-distended; normal bowel sounds; no masses, no organomegaly    Genitalia:   Deferred    Rectal:   Deferred    Extremities:  No cyanosis, clubbing or edema    Pulses:  2+ and symmetric all extremities    Skin:  Skin color, texture, turgor normal, no rashes or lesions    Lymph nodes:  No palpable cervical or supraclavicular lymphadenopathy        Lab Results:   Results from last 7 days   Lab Units 09/10/20  1458 09/10/20  0536   WBC Thousand/uL  -- 8  85   HEMOGLOBIN g/dL 8 8* 8 5*   HEMATOCRIT % 27 7* 27 2*   PLATELETS Thousands/uL  --  203     Results from last 7 days   Lab Units 09/10/20  0536   POTASSIUM mmol/L 4 0   CHLORIDE mmol/L 106   CO2 mmol/L 27   BUN mg/dL 16   CREATININE mg/dL 0 97   CALCIUM mg/dL 7 7*     Results from last 7 days   Lab Units 09/09/20  0756   INR  1 13           Imaging Studies: I have personally reviewed pertinent imaging studies  Mri Brain Wo Contrast    Result Date: 9/10/2020  Impression: White matter changes suggestive of chronic microangiopathy  No acute intracranial pathology  Workstation performed: [id number]     Ct Stroke Alert Brain    Result Date: 9/9/2020  Impression: 1  No acute intracranial CT abnormality  2   Chronic white matter microangiopathy  Findings were directly discussed with [facility] on 9/9/2020 7:50 AM  Workstation performed: [id number]     Cta Stroke Alert (head/neck)    Result Date: 9/9/2020  Impression: 1  Patent major vasculature of [place] without critical stenosis  Atherosclerotic disease of intracranial internal carotid and vertebral arteries  2   No hemodynamically significant stenosis of major cervical vasculature  Patent bilateral cervical carotid artery stents  Findings were directly discussed with [facility] on 9/9/2020 7:50 AM  Workstation performed: [id number]       ASSESSMENT and PLAN:      1) Iron deficiency anemia, need for anticoagulation and hematochezia - Fortunately, his hemoglobin is stable even after he had 2 episodes of bleeding  However, he has been chronically anemic for years now  He last had a colonoscopy 5 years ago revealing diverticulosis and 2 polyps  His last dose of Plavix was yesterday evening  He had aspirin this morning  His bleeding may be secondary to diverticulosis or AVM  His BUN is not elevated to suggest upper GI bleeding   - I spoke with the patient and the patient's daughter-in-law  She will give consent to EGD/colonoscopy  The patient himself is also agreeable  - Continue to hold aspirin and Plavix  - NPO after midnight      The patient was seen and examined by Dr Colette Carlos, all [facility] medical decisions were made with Dr Colette Carlos  Thank you for allowing us to participate in the care of this pleasant patient  We will follow up with you closely

## 2020-09-10 NOTE — SPEECH THERAPY NOTE
Speech Language/Pathology  Speech-Language Evaluation    Impression: Pt  Is VERY [place]  Has bilateral hearing aids, however unclear if they are functioning  Pt 's memory and problem solving skills are impaired  He reports that he does not have a life alert at home  Is unclear of the names of people that help him at home, just states that "they took away my license" and that the people are his sister in law and his grandchildren  He lives alone  Pt  Denies falling and does not know why he is in the hospital       Recommendation:  If patient is to return home, life alert and possibly meals on wheels  Patient does have in the least a moderate memory and cognitive impairment  Cognitive impairment vs  Aphasia (MRI results pending)  Therapy Prognosis: fair  Prognosis considerations: medical status  Frequency: speech therapy follow up    Current Medical Status:  Patient arrives via EMS for further evaluation and treatment of left-sided weakness  He lives by himself  He was last known normal at 4:00 p m  Yesterday  His daughter-in-law checked on him today and found him to have weakness  He also fell several hours ago  He is hard of hearing but denies any acute symptoms  He is unsure if he is weak  He denies previous history of stroke  Past Medical History:  See H&P for details      Special Studies:   CT stroke alert 20: 1  No acute intracranial CT abnormality    2   Chronic white matter microangiopathy  MRI is pending  Social/Educational/vocational Hx: Pt  Lives alone  He reports no pets  He stated that his wife  "5 months ago"  His family reportedly drives him to appointments and that "someone does his wash and cooking"  He stated that he lives on the first floor even though he does have a cellar, upstairs, and attic  Pt  States that he reheats food using his electric oven and stove  He has a microwave but he stated that he does not use it  He denies having lifeline/life alert  When asked what his normal routine is during the day he said "not much, I putter around"  Stated that he has 2 canes but cannot find them at home  He does report that he has help from his family that live nearby  They reportedly do the grocery shopping and take him to appointments  When asked how he knows about upcoming appointments, he could not state how  Even with cues (calendar,phone calls) he was unsure  At the end of the session, pt was asked if he could recall the name of the family person that helps him and he said I think it's "Matthew Bibboris"  In his chart, his primary contact is Carlos Im his daughter in law  Language Evaluation:  Pt  Was very [place] so this assessment was limited  Auditory Comprehension:  One step commands: [facility]  Word ID: [facility]  Letter ID:WFL  Personal y/n ?'s: WFL  Basic y/n ?'s:WFL    Reading Comprehension:  Sentence comprehension: [facility]  Paragraph comprehension: [facility]  Functional comprehension: [facility]    Verbal Expression:  Picture Description: cookie picture- got 50%  Conversation: [facility]  Able to make basic needs known? Yes    Motor Speech:  Dysarthria: None   I  Apraxia: None     Cognitive -linguistic skills:  Orientation: knew place  Does not recall caregivers names  Does not recall what he had for last meal      Problem solving: unable to state how he would get help if his hearing aids were not working and he could not hear on the phone      Memory:  Immediate: did not recall names of caregivers, but did recognize this nurse when he saw him    Long term: grossly WFL    Other Behaviors:  Aware/not aware/partially aware of deficits: no    Agata Vela MS CCC-SLP  Speech Language Pathologist  Available via [facility] License # [id number]  [facility] [address] # [id number]- [id number]

## 2020-09-10 NOTE — PLAN OF CARE
Problem: Potential for Falls  Goal: Patient will remain free of falls  Description: INTERVENTIONS:  - Assess patient frequently for physical needs  -  Identify cognitive and physical deficits and behaviors that affect risk of falls    -  [place] fall precautions as indicated by assessment   - Educate patient/family on patient safety including physical limitations  - Instruct patient to call for assistance with activity based on assessment  - Modify environment to reduce risk of injury  - Consider OT/PT consult to assist with strengthening/mobility  Outcome: Progressing

## 2020-09-10 NOTE — PROGRESS NOTES
Neurology Consult Follow Up      Radha Curiel is a 80 y o  male  [address] [place]        Assessment/Recommendations:    1  TIA versus possible right-sided ischemic infarct  2  HLD  3  HTN  4  Dementia     -monitor on telemetry  -neuro assessments per stroke pathway  -aspirin 325 mg daily  -continue Plavix 75 mg daily  -Lipitor 80 mg daily  -Aricept 10 mg daily  -MRI of the brain done today   -echocardiogram with shunt study  -speech therapy  -PT/OT     Patient is an 51-year-old male who lives alone found to have left-sided weakness when his family followed up with him this a m  Milka Henry Patient is unclear what happened, notes that he is getting old  Patient stated that he is feeling weak, is supposed to walk with a cane however he does not  According to his family, patient suffered a fall in his home so they brought him to the ER  Patient was a stroke alert this a m , CT of the head as well as CTA of the head and neck were completed without any acute findings noted  Patient provided full-dose aspirin and Lipitor 80 mg and was admitted for stroke pathway  MRI of the Brain completed, results remain pending at this time  Until findings available will remain on DAPT therapy with increased statin therapy  PT/OT and speech therapy to follow up with patient with regards to functional capabilities  To note patient is independent home however has had recent MRI of the brain with neuro: Studies for forgetfulness  Noted to have some neuro degenerative changes in the inferior medial temporal lobe and is on Aricept for this however does have some subtle lapses in memory and confusion  Chief Complaint:    Subjective:   Pt stated that he feels ok today  Noted that he is unsteady on his feet but he has a walker and canes at home  Pt anxious to go home, awaiting his reports from MRI and Echo  Past Medical History:   Diagnosis Date    Anemia     getting venofer once a week       Arthritis  Cancer ([facility] )     NOSE    Coronary artery disease     carotid stents bilateral    Diabetes mellitus ([facility] )     Hearing aid worn     BILATERAL    History of transfusion 10/11/2015    Hyperlipidemia     Hypertension     Spinal stenosis     hx of L-ROHAN     Social History     Socioeconomic History    Marital status:      Spouse name: Not on file    Number of children: Not on file    Years of education: Not on file    Highest education level: Not on file   Occupational History    Not on file   Social Needs    Financial resource strain: Not on file    Food insecurity     Worry: Not on file     Inability: Not on file    Transportation needs     Medical: Not on file     Non-medical: Not on file   Tobacco Use    Smoking status: Former Smoker     Packs/day: 1 50     Years: 20 00     Pack years: 30 00     Last attempt to quit: 1996     Years since quittin 6    Smokeless tobacco: Never Used   Substance and Sexual Activity    Alcohol use: Yes     Frequency: Monthly or less     Comment: occ    Drug use: No    Sexual activity: Not on file   Lifestyle    Physical activity     Days per week: Not on file     Minutes per session: Not on file    Stress: Not on file   Relationships    Social connections     Talks on phone: Not on file     Gets together: Not on file     Attends Protestant service: Not on file     Active member of club or organization: Not on file     Attends meetings of clubs or organizations: Not on file     Relationship status: Not on file    Intimate partner violence     Fear of current or ex partner: Not on file     Emotionally abused: Not on file     Physically abused: Not on file     Forced sexual activity: Not on file   Other Topics Concern    Not on file   Social History Narrative    Not on file     History reviewed  No pertinent family history  ROS:  Please see HPI for positive symptoms  No fever, no chills, no weight change     Ocular: No diplopia, no blurred vision, spot/zigzag lines   HEENT:  No sore throat, headache or congestion  No neck pain  COR:  No chest pain  No palpitations  Lungs:  no sob  GI:  no  nausea, no vomiting, no diarrhea, no constipation, no anorexia  :  No dysuria, frequency, or urgency  No hematuria  Musculoskeletal:  No joint pain or swelling   Skin:  No rash or itching  Psychiatric:  no anxiety, no depression  Endocrine:  No polyuria or polydipsia  Objective:  /74   Pulse 82   Temp 97 6 °F (36 4 °C) (Oral)   Resp 16   Ht 6' (1 829 m)   Wt 80 kg (176 lb 5 9 oz)   SpO2 99%   BMI 23 92 kg/m²     General: alert   Mental status: oriented x3, has difficulty remembering date  Attention: normal  Knowledge: fair  Language and Speech: normal  Cranial nerves:   CN II: Visual fields are full to confrontation  Fundoscopic exam is normal with sharp discs and no vascular changes  Pupils are 3 mm and briskly reactive to light  CN III, IV, VI: At primary gaze, there is no eye deviation  CN V: Facial sensation is intact in all 3 divisions bilaterally  Corneal responses are intact  CN VII: Face is assymmetrical, mild dampening of the Lt mouth at the fold  CN VIII: Hearing is normal to rubbing fingers  CN IX, X: Palate elevates symmetrically  Phonation is normal   CN XI: Head turning and shoulder shrug are intact  CN XII: Tongue is midline with normal movements and no atrophy  Motor: There is no pronator drift of out-stretched arms  Muscle bulk and tone are normal with decreased strength in LLE  Muscle exam  Arm Right Left Leg Right Left   Deltoid 5/5 5/5 Iliopsoas 5/5 4+/5   Biceps 5/5 5/5 Quads 5/5 4+/5   Triceps 5/5 5/5 Hamstrings 5/5 4+/5   Wrist Extension 5/5 5/5 Ankle Dorsi Flexion 5/5 4+/5   Wrist Flexion 5/5 5/5 Ankle Plantar Flexion 5/5 4+/5       Sensory: normal to light touch, temperature, vibration  Proprioception intact  Has a slight decrease in sensation bilaterally to the LE    Gait: unsteady, required RW  Coordination: finger to nose and heel to toe intact     Reflexes    RJ BJ TJ KJ AJ Plantars Jalloh's   Right 2+ 2+ 2+ 2+ 2+ Downgoing Not present   Left 2+ 2+ 2+ 2+ 2+ Downgoing Not present      Heart: Regular rate and rhythm  Lung: clear to auscultation   Abd: soft, non-tender, non-distended with positive bowel sounds in all quads  Skin: dry and intact    Labs:      Lab Results   Component Value Date    WBC 8 85 09/10/2020    HGB 8 5 (L) 09/10/2020    HCT 27 2 (L) 09/10/2020    MCV 98 09/10/2020     09/10/2020     Lab Results   Component Value Date    HGBA1C 6 1 (H) 09/09/2020     Lab Results   Component Value Date    ALT 18 11/08/2019    AST 16 11/08/2019    ALKPHOS 79 11/08/2019    BILITOT 0 9 10/11/2015     Lab Results   Component Value Date    GLUCOSE 142 (H) 10/11/2015    CALCIUM 7 7 (L) 09/10/2020     10/11/2015    K 4 0 09/10/2020    CO2 27 09/10/2020     09/10/2020    BUN 16 09/10/2020    CREATININE 0 97 09/10/2020     Chol 76  LDL 26      Review of reports and notes reveal:   Independent review of films/reports:  Ct Stroke Alert Brain Result Date: 9/9/2020  1  No acute intracranial CT abnormality  2   Chronic white matter microangiopathy  Findings were directly discussed with Jamilah Sorto on 9/9/2020 7:50 AM  Workstation performed: [id number]     Cta Stroke Alert (head/neck) Result Date: 9/9/2020  1  Patent major vasculature of [place] without critical stenosis  Atherosclerotic disease of intracranial internal carotid and vertebral arteries  2   No hemodynamically significant stenosis of major cervical vasculature  Patent bilateral cervical carotid artery stents  Findings were directly discussed with Jamilah Sorto on 9/9/2020 7:50 AM  Workstation performed: [id number]         Thank you for this consult      Total time of encounter:  30 min  More than 50% of the time was used in counseling and/or coordination of care  Extent of couseling and/or coordination of care

## 2020-09-10 NOTE — OCCUPATIONAL THERAPY NOTE
Occupational Therapy Evaluation/Treatment     09/10/20 1800   Note Type   Note type Eval/Treat   Restrictions/Precautions   Other Precautions Chair Alarm; Bed Alarm;Cognitive; Fall Risk;Hard of hearing   Pain Assessment   Pain Assessment Tool 0-10   Pain Score No Pain   Home Living   Type of Home House   Home Layout Multi-level  (only uses 1st floor )   Bathroom Shower/Tub Tub/shower unit   Bathroom Toilet Standard   Bathroom Equipment Commode   Home Equipment Walker;Cane   Prior Function   Level of [place] Independent with ADLs and functional mobility; Needs assistance with IADLs   Lives With Alone   Receives Help From Family  (Pt states sister in law comes a few times a week to help)   ADL Assistance Independent   IADLs Needs assistance   Comments Pt very [place], difficult to obtain answers    Subjective   Subjective "I didn't flush the toilet"   ADL   Eating Assistance 5  Supervision/Setup   Grooming Assistance 5  Supervision/Setup   UB Bathing Assistance 5  Supervision/Setup   LB Bathing Assistance 5  Supervision/Setup   UB Dressing Assistance 5  Supervision/Setup   LB Dressing Assistance 5  Supervision/Setup   Toileting Assistance  5  Supervision/Setup   Functional Assistance 5  Supervision/Setup   Transfers   Sit to [address] to Sit 5  Supervision   Stand pivot 5  Supervision   Functional Mobility   Functional Mobility 5  Supervision   Additional Comments 15 ft to sink with no AD   Balance   Static Sitting Good   Dynamic Sitting Good   Static Standing Fair +   Dynamic Standing Fair +   Ambulatory Fair   Activity Tolerance   Activity Tolerance Patient tolerated treatment well   RUE Assessment   RUE Assessment WFL  (grossly 4-/5 MMT)   LUE Assessment   LUE Assessment WFL  (Grossly 4-/5 MMT)   Cognition   Overall Cognitive Status WFL   Arousal/Participation Alert; Cooperative   Attention Within functional limits   Orientation Level Oriented X4   Following Commands Follows all commands and directions without difficulty   Assessment   Limitation Decreased ADL status; Decreased UE ROM; Decreased UE strength;Decreased cognition;Decreased endurance;Decreased high-level ADLs; Decreased self-care trans   Prognosis Good   Assessment Patient evaluated by Occupational Therapy  Patient admitted with Left-sided weakness  The patients occupational profile, medical and therapy history includes a extensive additional review of physical, cognitive, or psychosocial history related to current functional performance  Comorbidities affecting functional mobility and ADLS include: anemia, arthritis, CAD, diabetes, hypertension, hyperlipidemia, spinal stenosis and cancer, and hearing aids  Prior to admission, patient was independent with functional mobility without assistive device, independent with ADLS and assist with IADLS  The evaluation identifies the following performance deficits: weakness, decreased ROM, decreased endurance, increased fall risk, decreased ADLS, decreased IADLS, decreased activity tolerance, decreased safety awareness and decreased strength, that result in activity limitations and/or participation restrictions  This evaluation requires clinical decision making of moderate complexity, because the patient may present with comorbidities that affect occupational performance and required minimal or moderate modification of tasks or assistance with the consideration of several treatment options  The Barthel Index was used as a functional outcome tool presenting with a score of 65, indicating moderate limitations of functional mobility and ADLS  Patient will benefit from skilled Occupational Therapy services to address above deficits and facilitate a safe return to prior level of function     Goals   Patient Goals go home   STG Time Frame   (1-7)   Short Term Goal  Goals established to promote patient goal of going home:  Patient will increase standing tolerance to 5 minutes during ADL task to decrease assistance level and decrease fall risk; Patient will increase bed mobility to independent in preparation for ADLS and transfers; Patient will increase functional mobility to and from bathroom with single point cane independently to increase performance with ADLS and to use a toilet; Patient will tolerate 10 minutes of UE ROM/strengthening to increase general activity tolerance and performance in ADLS/IADLS; Patient will improve functional activity tolerance to 10 minutes of sustained functional tasks to increase participation in basic self-care and decrease assistance level;  Patient will be able to to verbalize understanding and perform energy conservation/proper body mechanics during ADLS and functional mobility at least 75% of the time with minimal cueing to decrease signs of fatigue and increase stamina to return to prior level of function;  Patient will increase dynamic standing balance to fair+ to improve postural stability and decrease fall risk during standing ADLS and transfers  LTG Time Frame   (8-14)   Long Term Goal Goals established to promote patient goal of going home:  Patient will increase standing tolerance to 10 minutes during ADL task to decrease assistance level and decrease fall risk; Patient will tolerate 20 minutes of UE ROM/strengthening to increase general activity tolerance and performance in ADLS/IADLS; Patient will improve functional activity tolerance to 20 minutes of sustained functional tasks to increase participation in basic self-care and decrease assistance level;  Patient will be able to to verbalize understanding and perform energy conservation/proper body mechanics during ADLS and functional mobility at least 90% of the time with minimal cueing to decrease signs of fatigue and increase stamina to return to prior level of function;  Patient will increase dynamic standing balance to good to improve postural stability and decrease fall risk during standing ADLS and transfers  Functional Transfer Goals   Pt Will Perform All Functional Transfers Independently STG    ADL Goals   Pt Will Perform Eating Independently STG   Pt Will Perform Grooming Independently STG   Pt Will Perform Bathing Independently LTG   Pt Will Perform UE Dressing Independently STG   Pt Will Perform LE Dressing Independently STG   Pt Will Perform Toileting Independently STG   Plan   Treatment Interventions ADL retraining;Functional transfer training;UE strengthening/ROM; Endurance training;Cognitive reorientation;Patient/family training;Energy conservation; Activityengagement   Goal Expiration Date 09/24/20   OT Frequency 5x/wk   Additional Treatment Session   Start Time 1430   End Time 1440   Treatment Assessment Pt seen for occupational therapy  Pt completed sit to stand transfer with supervision and ambulated 15 ft to the sink to wash hands with no AD and supervision, demonstrating good balance and safety awareness  Pt washed hands at the sink with supervision and demonstrated good hygiene skills as well as dynamic balance  Pt completed 2 x 10 reps each sitting in chair of shoulder flexion/horizontal abduction, elbow flexion, and hand squeezes, stating he was experiencing fatigue after the shoulder exercises, demonstrating decreased endurance  Pt was very [place] but cooperative and pleasant to work with when able to hear directions  Pt should continue OT services to address ADL's, endurance, and UE strength      Recommendation   OT Discharge Recommendation Return to previous environment with social support   Barthel Index   Feeding 10   Bathing 0   Grooming Score 0   Dressing Score 10   Bladder Score 10   Bowels Score 10   Toilet Use Score 10   Transfers (Bed/Chair) Score 15   Mobility (Level Surface) Score 0   Stairs Score 0   Barthel Index Score 65   Modified [place] Scale   Modified [place] Scale 1   Licensure   [place] License Number  Lore Carter, [id number]

## 2020-09-10 NOTE — UTILIZATION REVIEW
Initial Clinical Review  PATIENT WAS OBSERVATION STATUS 9/9/20 TIA CONVERTED TO INPATIENT ADMISSION FOR NEW GI BLEED CONSULTING GI     Admission: Date/Time/Statement:   Admission Orders (From admission, onward)     Ordered        09/10/20 1522  Inpatient Admission  Once                   Orders Placed This Encounter   Procedures    Inpatient Admission     Standing Status:   Standing     Number of Occurrences:   1     Order Specific Question:   Admitting Physician     Answer:   Jignesh Aleman [[id number]]     Order Specific Question:   Level of Care     Answer:   Med Surg [[id number]]     Order Specific Question:   Estimated length of stay     Answer:   More than 2 Midnights     Order Specific Question:   Certification     Answer:   I certify that inpatient services are medically necessary for this patient for a duration of greater than two midnights  See H&P and MD Progress Notes for additional information about the patient's course of treatment  ED Arrival Information     Expected Arrival Acuity Means of Arrival Escorted By Service Admission Type    - 9/9/2020 07:27 [facility]    Arrival Complaint    STROKE Alert        Chief Complaint   Patient presents with   [address]     Patient arrives as stroke alert LKW 4pm yesterday  L sided weakness     Assessment/Plan: 80 y o  male who presents with left-sided weakness  Patient is extremely hard of hearing and also not a great historian  Spoke with patient's daughter-in-law  As per daughter-in-law, patient had a fall this morning  He pressed his Life Alert button  As per  who is also a friend of the daughter-in-law, patient was already off the floor but was noted to have some slurring of speech  When EMS arrived, he was noted to have left-sided weakness and left facial droop as per daughter-in-law  last known normal was 4:00 p m   Yesterday  Left-sided weakness  Assessment & Plan  Patient presented with left-sided weakness along with left facial droop, status post fall at home  · Admit to telemetry  · CT of head revealed chronic changes  · Continue full-dose ASA, Plavix and statin  · Neuro checks   · Lipid panel, HgA1C  · MRI in AM  · CTA head/neck showed 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  · Echo with bubble study  · Neuro eval  NEUROLOGY  Until we review MRI brain, patient can remain on dual antiplatelet therapy with aspirin and plavix  Will adjust regimen after MRI  Lipitor 80mg for now  Speech and swallow evaluation   PT/OT  MRI White matter changes suggestive of chronic microangiopathy   No acute intracranial pathology  [facility] Matos informed of blood present in patient's stool  H/H ordered  Aspirin and Lovenox discontinued    CONSULTED GI   ED Triage Vitals   Temperature Pulse Respirations Blood Pressure SpO2   09/09/20 0728 09/09/20 0728 09/09/20 0728 09/09/20 0728 09/09/20 0728   98 8 °F (37 1 °C) 97 18 118/58 95 %      Temp Source Heart Rate Source Patient Position - Orthostatic VS BP Location FiO2 (%)   09/09/20 0728 09/09/20 0728 09/09/20 0728 09/09/20 0728 --   Tympanic Monitor Lying Right arm       Pain Score       09/09/20 0730       No Pain          Wt Readings from Last 1 Encounters:   09/10/20 80 kg (176 lb 5 9 oz)     Additional Vital Signs:   Pertinent Labs/Diagnostic Test Results:       Results from last 7 days   Lab Units 09/10/20  1458 09/10/20  0536 09/09/20  0756   WBC Thousand/uL  --  8 85 10 24*   HEMOGLOBIN g/dL 8 8* 8 5* 8 7*   HEMATOCRIT % 27 7* 27 2* 27 9*   PLATELETS Thousands/uL  --  203 208         Results from last 7 days   Lab Units 09/10/20  0536 09/09/20  0756   SODIUM mmol/L 142 142   POTASSIUM mmol/L 4 0 4 1   CHLORIDE mmol/L 106 107   CO2 mmol/L 27 26   ANION GAP mmol/L 9 9   BUN mg/dL 16 19   CREATININE mg/dL 0 97 1 11   EGFR ml/min/1 73sq m 69 59   CALCIUM mg/dL 7 7* 8 2*   MAGNESIUM mg/dL 1 5*  --          Results from last 7 days   Lab Units 09/10/20  0704 09/09/20 2033 09/09/20  0727   POC GLUCOSE mg/dl 93 133 124     Results from last 7 days   Lab Units 09/10/20  0536 09/09/20  0756   GLUCOSE RANDOM mg/dL 82 123         Results from last 7 days   Lab Units 09/10/20  0536 09/09/20  0756   HEMOGLOBIN A1C % 5 9* 6 1*   EAG mg/dl 123 128     No results found for: BETA-HYDROXYBUTYRATE                   Results from last 7 days   Lab Units 09/09/20 2028 09/09/20  0756   TROPONIN I ng/mL <0 02 <0 02         Results from last 7 days   Lab Units 09/09/20  0756   PROTIME seconds 14 4   INR  1 13   PTT seconds 25     Results from last 7 days   Lab Units 09/09/20  0831   CLARITY UA  Clear   COLOR UA  Yellow   SPEC GRAV UA  1 010   PH UA  5 0   GLUCOSE UA mg/dl Negative   KETONES UA mg/dl Negative   BLOOD UA  Negative   PROTEIN UA mg/dl Negative   NITRITE UA  Negative   BILIRUBIN UA  Negative   UROBILINOGEN UA E U /dl 0 2   LEUKOCYTES UA  Negative     ED Treatment:   Medication Administration from 09/09/2020 0727 to 09/09/2020 1244       Date/Time Order Dose Route Action Action by Comments     09/09/2020 0837 aspirin tablet 325 mg 325 mg Oral Given Tano Magana RN      09/09/2020 0837 atorvastatin (LIPITOR) tablet 80 mg 80 mg Oral Given Tano Magana RN         Past Medical History:   Diagnosis Date    Anemia     getting venofer once a week       Arthritis     Cancer ([facility] )     NOSE    Coronary artery disease     carotid stents bilateral    Diabetes mellitus ([facility] )     Hearing aid worn     BILATERAL    History of transfusion 10/11/2015    Hyperlipidemia     Hypertension     Spinal stenosis     hx of L-ROHAN     Present on Admission:   Left-sided weakness   Essential hypertension   Dyslipidemia   Type 2 diabetes mellitus, without long-term current use of insulin (HCC)   Carotid disease, bilateral ([facility] )      Admitting Diagnosis: Weakness [R53 1]  Facial droop [R29 810]  Age/Sex: 80 y o  male  Admission Orders:  Tele mon  Is  Neuro checks  Clear liquid diet  Npo  EGD  COLONOSCOPY  Scheduled Medications:  atorvastatin, 80 mg, Oral, Daily With Dinner  bisacodyl, 10 mg, Oral, Once  cholecalciferol, 2,000 Units, Oral, Early Morning  donepezil, 10 mg, Oral, HS  folic acid, 619 mcg, Oral, Early Morning  insulin lispro, 1-5 Units, Subcutaneous, TID AC  insulin lispro, 1-5 Units, Subcutaneous, HS  pantoprazole, 40 mg, Intravenous, Q12H DEANGELO  polyethylene glycol, 238 g, Oral, Once  tamsulosin, 0 4 mg, Oral, Daily With Dinner      Continuous IV Infusions:     PRN Meds:       IP CONSULT TO NEUROLOGY  IP CONSULT TO CASE MANAGEMENT  IP CONSULT TO CASE MANAGEMENT  IP CONSULT TO NUTRITION SERVICES    Network Utilization Review Department  [email or web address]  ATTENTION: Please call with any questions or concerns to [phone] and carefully listen to the prompts so that you are directed to the right person  All voicemails are confidential   Soila Watson all requests for admission clinical reviews, approved or denied determinations and any other requests to dedicated fax number below belonging to the campus where the patient is receiving treatment   List of dedicated fax numbers for the Facilities:  [address] [place] [address] DENIALS (Administrative/Medical Necessity) [phone]   [address] (Maternity/NICU/Pediatrics) [phone]   Jenifer Flatten [phone]   [place] [phone]   Massiel Cartwright [phone]   Pj Colindres [phone]   [facility] [address] [phone]   [facility]  [phone]   [address] [facility] [address]  [address] [facility] [address] [facility] [phone]

## 2020-09-10 NOTE — PHYSICAL THERAPY NOTE
PT EVALUATION       09/10/20 0850   Note Type   Note type Eval/Treat   Pain Assessment   Pain Assessment Tool Pain Assessment not indicated - pt denies pain   Home Living   Type of [address] One level   Home Equipment Cane   Prior Function   Level of [employer] Independent with ADLs and functional mobility  (ambulatory with cane)   Lives With Alone   Receives Help From Family   Comments Pt is very [place] so social history difficult to obtain  Restrictions/Precautions   Other Precautions Fall Risk;Hard of hearing   General   Additional Pertinent History Pt admitted with L sided weakness  Work up in progress to r/o CVA  MRI brain pending  Cognition   Overall Cognitive Status WFL   Arousal/Participation Cooperative   Following Commands Follows one step commands without difficulty when pt hears what you are saying  RLE Assessment   RLE Assessment   (ROM WFL, MMT 4+/5)   LLE Assessment   LLE Assessment   (ROM WFL, MMT 4+/5)   Proprioception   RLE Proprioception   (heel to shin grossly intact)   Bed Mobility   Supine to Sit 5  Supervision   Sit to Supine 5  Supervision   Transfers   Sit to Stand 5  Supervision   Stand to Sit 5  Supervision   Stand pivot 5  Supervision   Ambulation/Elevation   Gait pattern   (occasional mis stepping with self correction of balance)   Gait Assistance 5  Supervision   Assistive Device Straight cane   Distance 25 feet   Balance   Static Sitting Good   Dynamic Sitting Good   Static Standing Fair +   Dynamic Standing Fair +   Ambulatory Fair   Activity Tolerance   Activity Tolerance Patient tolerated treatment well   Assessment   Prognosis Good   Problem List Impaired balance;Decreased mobility   Assessment Patient seen for Physical Therapy evaluation  Patient admitted with Left-sided weakness  Comorbidities affecting patient's physical performance include: DM, anemia, arthritis, spinal stenosis, [place]    Personal factors affecting patient at time of initial evaluation include: ambulating with assistive device, inability to perform dynamic tasks in community, limited home support, hearing impairments and limited insight into impairments  Prior to admission, patient was independent with functional mobility with cane  Please find objective findings from Physical Therapy assessment regarding body systems outlined above with impairments and limitations including weakness, impaired balance, decreased endurance, decreased activity tolerance, decreased functional mobility tolerance and fall risk  The Barthel Index was used as a functional outcome tool presenting with a score of 65 today indicating moderate limitations of functional mobility and ADLS  Patient's clinical presentation is currently unstable/unpredictable as seen in patient's presentation of increased fall risk and new onset of impairment of functional mobility  Pt would benefit from continued Physical Therapy treatment to address deficits as defined above and maximize level of functional mobility  As demonstrated by objective findings, the assigned level of complexity for this evaluation is high  Goals   Patient Goals "go home"  (pt reports he feels "fine")   STG Expiration Date 09/17/20   Short Term Goal #1 independent ambulation with cane indoor level surfaces with steady gait, independent up and down 4 steps   LTG Expiration Date 09/24/20   Long Term Goal #1 imrprove dynamic balance to good to decrease risk of falls   Long Term Goal #2 independent ambulation outdoor level surfaces 300 feet so pt can go out shopping   Plan   Treatment/Interventions ADL retraining;Functional transfer training;LE strengthening/ROM; Therapeutic exercise;Gait training;Equipment eval/education;Patient/family training; Endurance training   PT Frequency 5w   Recommendation   PT Discharge Recommendation Home with skilled therapy, increased family supervision if possible  (home PT)   Equipment Recommended   (pt has a cane)   Modified Bates Scale   Modified [place] Scale 2   Barthel Index   Feeding 10   Bathing 0   Grooming Score 0   Dressing Score 5   Bladder Score 10   Bowels Score 10   Toilet Use Score 5   Transfers (Bed/Chair) Score 10   Mobility (Level Surface) Score 10   Stairs Score 5   Barthel Index Score 72   Licensure   [place] License Number  Mart Irvin PT [id number]       Time [id number]  Time [id number]  Total Time: 10      S:  "I have to use the bathroom"  O:  Supervision to urinate standing at the toilet  Pt ambulated in the hallway 150 feet with supervision with several 90 degree and 180 degree turns  Pt mis steps occasionally and staggers occasionally especially when he is distracted  A:  Pt tolerated activity well  Gait is mildly unsteady but no balance loss  P:  Recommend home PT and increased family supervision at home if possible due to recent fall      Padmini Jordan, PT

## 2020-09-11 ENCOUNTER — APPOINTMENT (INPATIENT)
Dept: PERIOP | Facility: HOSPITAL | Age: 85
DRG: 378 | End: 2020-09-11
Payer: MEDICARE

## 2020-09-11 ENCOUNTER — ANESTHESIA (INPATIENT)
Dept: PERIOP | Facility: HOSPITAL | Age: 85
DRG: 378 | End: 2020-09-11
Payer: MEDICARE

## 2020-09-11 ENCOUNTER — ANESTHESIA EVENT (INPATIENT)
Dept: PERIOP | Facility: HOSPITAL | Age: 85
DRG: 378 | End: 2020-09-11
Payer: MEDICARE

## 2020-09-11 PROBLEM — I35.0 AORTIC STENOSIS: Status: ACTIVE | Noted: 2020-09-11

## 2020-09-11 PROBLEM — D50.9 IDA (IRON DEFICIENCY ANEMIA): Status: RESOLVED | Noted: 2020-09-10 | Resolved: 2020-09-11

## 2020-09-11 LAB
ABO GROUP BLD BPU: NORMAL
ABO GROUP BLD BPU: NORMAL
ANION GAP SERPL CALCULATED.3IONS-SCNC: 8 MMOL/L (ref 4–13)
ATRIAL RATE: 90 BPM
BPU ID: NORMAL
BPU ID: NORMAL
BUN SERPL-MCNC: 18 MG/DL (ref 5–25)
CALCIUM SERPL-MCNC: 7.2 MG/DL (ref 8.3–10.1)
CHLORIDE SERPL-SCNC: 109 MMOL/L (ref 100–108)
CO2 SERPL-SCNC: 25 MMOL/L (ref 21–32)
CREAT SERPL-MCNC: 1.11 MG/DL (ref 0.6–1.3)
CROSSMATCH: NORMAL
CROSSMATCH: NORMAL
ERYTHROCYTE [DISTWIDTH] IN BLOOD BY AUTOMATED COUNT: 15.8 % (ref 11.6–15.1)
GFR SERPL CREATININE-BSD FRML MDRD: 59 ML/MIN/1.73SQ M
GLUCOSE SERPL-MCNC: 125 MG/DL (ref 65–140)
GLUCOSE SERPL-MCNC: 136 MG/DL (ref 65–140)
GLUCOSE SERPL-MCNC: 139 MG/DL (ref 65–140)
GLUCOSE SERPL-MCNC: 207 MG/DL (ref 65–140)
HCT VFR BLD AUTO: 23.8 % (ref 36.5–49.3)
HCT VFR BLD AUTO: 27.3 % (ref 36.5–49.3)
HGB BLD-MCNC: 7.8 G/DL (ref 12–17)
HGB BLD-MCNC: 7.9 G/DL (ref 12–17)
HGB BLD-MCNC: 8.1 G/DL (ref 12–17)
MAGNESIUM SERPL-MCNC: 1.9 MG/DL (ref 1.6–2.6)
MCH RBC QN AUTO: 30.8 PG (ref 26.8–34.3)
MCHC RBC AUTO-ENTMCNC: 32.8 G/DL (ref 31.4–37.4)
MCV RBC AUTO: 94 FL (ref 82–98)
P AXIS: 27 DEGREES
PHOSPHATE SERPL-MCNC: 2.1 MG/DL (ref 2.3–4.1)
PLATELET # BLD AUTO: 173 THOUSANDS/UL (ref 149–390)
PMV BLD AUTO: 10.4 FL (ref 8.9–12.7)
POTASSIUM SERPL-SCNC: 4.1 MMOL/L (ref 3.5–5.3)
PR INTERVAL: 176 MS
QRS AXIS: -35 DEGREES
QRSD INTERVAL: 138 MS
QT INTERVAL: 402 MS
QTC INTERVAL: 491 MS
RBC # BLD AUTO: 2.53 MILLION/UL (ref 3.88–5.62)
SODIUM SERPL-SCNC: 142 MMOL/L (ref 136–145)
T WAVE AXIS: 11 DEGREES
UNIT DISPENSE STATUS: NORMAL
UNIT DISPENSE STATUS: NORMAL
UNIT PRODUCT CODE: NORMAL
UNIT PRODUCT CODE: NORMAL
UNIT RH: NORMAL
UNIT RH: NORMAL
VENTRICULAR RATE: 90 BPM
WBC # BLD AUTO: 12.91 THOUSAND/UL (ref 4.31–10.16)

## 2020-09-11 PROCEDURE — 85018 HEMOGLOBIN: CPT | Performed by: STUDENT IN AN ORGANIZED HEALTH CARE EDUCATION/TRAINING PROGRAM

## 2020-09-11 PROCEDURE — C9113 INJ PANTOPRAZOLE SODIUM, VIA: HCPCS | Performed by: PHYSICIAN ASSISTANT

## 2020-09-11 PROCEDURE — 45382 COLONOSCOPY W/CONTROL BLEED: CPT | Performed by: INTERNAL MEDICINE

## 2020-09-11 PROCEDURE — 85027 COMPLETE CBC AUTOMATED: CPT | Performed by: PHYSICIAN ASSISTANT

## 2020-09-11 PROCEDURE — 30233N1 TRANSFUSION OF NONAUTOLOGOUS RED BLOOD CELLS INTO PERIPHERAL VEIN, PERCUTANEOUS APPROACH: ICD-10-PCS | Performed by: INTERNAL MEDICINE

## 2020-09-11 PROCEDURE — 93010 ELECTROCARDIOGRAM REPORT: CPT | Performed by: INTERNAL MEDICINE

## 2020-09-11 PROCEDURE — 82728 ASSAY OF FERRITIN: CPT | Performed by: PHYSICIAN ASSISTANT

## 2020-09-11 PROCEDURE — 43270 EGD LESION ABLATION: CPT | Performed by: INTERNAL MEDICINE

## 2020-09-11 PROCEDURE — 83550 IRON BINDING TEST: CPT | Performed by: PHYSICIAN ASSISTANT

## 2020-09-11 PROCEDURE — 83735 ASSAY OF MAGNESIUM: CPT | Performed by: PHYSICIAN ASSISTANT

## 2020-09-11 PROCEDURE — 85018 HEMOGLOBIN: CPT | Performed by: PHYSICIAN ASSISTANT

## 2020-09-11 PROCEDURE — 82948 REAGENT STRIP/BLOOD GLUCOSE: CPT

## 2020-09-11 PROCEDURE — 45381 COLONOSCOPY SUBMUCOUS NJX: CPT | Performed by: INTERNAL MEDICINE

## 2020-09-11 PROCEDURE — 84100 ASSAY OF PHOSPHORUS: CPT | Performed by: PHYSICIAN ASSISTANT

## 2020-09-11 PROCEDURE — 0W3P8ZZ CONTROL BLEEDING IN GASTROINTESTINAL TRACT, VIA NATURAL OR ARTIFICIAL OPENING ENDOSCOPIC: ICD-10-PCS | Performed by: INTERNAL MEDICINE

## 2020-09-11 PROCEDURE — 83540 ASSAY OF IRON: CPT | Performed by: PHYSICIAN ASSISTANT

## 2020-09-11 PROCEDURE — P9016 RBC LEUKOCYTES REDUCED: HCPCS

## 2020-09-11 PROCEDURE — 85014 HEMATOCRIT: CPT | Performed by: STUDENT IN AN ORGANIZED HEALTH CARE EDUCATION/TRAINING PROGRAM

## 2020-09-11 PROCEDURE — 80048 BASIC METABOLIC PNL TOTAL CA: CPT | Performed by: PHYSICIAN ASSISTANT

## 2020-09-11 PROCEDURE — 99233 SBSQ HOSP IP/OBS HIGH 50: CPT | Performed by: INTERNAL MEDICINE

## 2020-09-11 PROCEDURE — 0DJ08ZZ INSPECTION OF UPPER INTESTINAL TRACT, VIA NATURAL OR ARTIFICIAL OPENING ENDOSCOPIC: ICD-10-PCS | Performed by: INTERNAL MEDICINE

## 2020-09-11 PROCEDURE — NC001 PR NO CHARGE: Performed by: INTERNAL MEDICINE

## 2020-09-11 RX ORDER — LIDOCAINE HYDROCHLORIDE 10 MG/ML
INJECTION, SOLUTION EPIDURAL; INFILTRATION; INTRACAUDAL; PERINEURAL AS NEEDED
Status: DISCONTINUED | OUTPATIENT
Start: 2020-09-11 | End: 2020-09-11

## 2020-09-11 RX ORDER — EPINEPHRINE 1 MG/ML
INJECTION, SOLUTION, CONCENTRATE INTRAVENOUS CODE/TRAUMA/SEDATION MEDICATION
Status: COMPLETED | OUTPATIENT
Start: 2020-09-11 | End: 2020-09-11

## 2020-09-11 RX ORDER — POTASSIUM CHLORIDE 14.9 MG/ML
20 INJECTION INTRAVENOUS ONCE
Status: DISCONTINUED | OUTPATIENT
Start: 2020-09-11 | End: 2020-09-11

## 2020-09-11 RX ORDER — SODIUM CHLORIDE 9 MG/ML
INJECTION, SOLUTION INTRAVENOUS CONTINUOUS PRN
Status: DISCONTINUED | OUTPATIENT
Start: 2020-09-11 | End: 2020-09-11

## 2020-09-11 RX ORDER — SODIUM CHLORIDE, SODIUM GLUCONATE, SODIUM ACETATE, POTASSIUM CHLORIDE, MAGNESIUM CHLORIDE, SODIUM PHOSPHATE, DIBASIC, AND POTASSIUM PHOSPHATE .53; .5; .37; .037; .03; .012; .00082 G/100ML; G/100ML; G/100ML; G/100ML; G/100ML; G/100ML; G/100ML
75 INJECTION, SOLUTION INTRAVENOUS CONTINUOUS
Status: DISCONTINUED | OUTPATIENT
Start: 2020-09-11 | End: 2020-09-11

## 2020-09-11 RX ORDER — ALBUMIN, HUMAN INJ 5% 5 %
SOLUTION INTRAVENOUS
Status: COMPLETED
Start: 2020-09-11 | End: 2020-09-11

## 2020-09-11 RX ORDER — ALBUMIN, HUMAN INJ 5% 5 %
12.5 SOLUTION INTRAVENOUS ONCE
Status: DISCONTINUED | OUTPATIENT
Start: 2020-09-11 | End: 2020-09-11

## 2020-09-11 RX ORDER — LIDOCAINE HYDROCHLORIDE 20 MG/ML
JELLY TOPICAL ONCE
Status: DISCONTINUED | OUTPATIENT
Start: 2020-09-11 | End: 2020-09-11

## 2020-09-11 RX ORDER — ALBUMIN, HUMAN INJ 5% 5 %
SOLUTION INTRAVENOUS CONTINUOUS PRN
Status: DISCONTINUED | OUTPATIENT
Start: 2020-09-11 | End: 2020-09-11

## 2020-09-11 RX ORDER — EPHEDRINE SULFATE 50 MG/ML
INJECTION INTRAVENOUS AS NEEDED
Status: DISCONTINUED | OUTPATIENT
Start: 2020-09-11 | End: 2020-09-11

## 2020-09-11 RX ORDER — PROPOFOL 10 MG/ML
INJECTION, EMULSION INTRAVENOUS AS NEEDED
Status: DISCONTINUED | OUTPATIENT
Start: 2020-09-11 | End: 2020-09-11

## 2020-09-11 RX ADMIN — ATORVASTATIN CALCIUM 40 MG: 40 TABLET, FILM COATED ORAL at 17:39

## 2020-09-11 RX ADMIN — SODIUM CHLORIDE, SODIUM GLUCONATE, SODIUM ACETATE, POTASSIUM CHLORIDE AND MAGNESIUM CHLORIDE 75 ML/HR: 526; 502; 368; 37; 30 INJECTION, SOLUTION INTRAVENOUS at 17:15

## 2020-09-11 RX ADMIN — Medication 400 MCG: at 06:00

## 2020-09-11 RX ADMIN — DONEPEZIL HYDROCHLORIDE 10 MG: 5 TABLET ORAL at 21:23

## 2020-09-11 RX ADMIN — EPHEDRINE SULFATE 5 MG: 50 INJECTION, SOLUTION INTRAVENOUS at 13:18

## 2020-09-11 RX ADMIN — LIDOCAINE HYDROCHLORIDE 50 MG: 10 INJECTION, SOLUTION EPIDURAL; INFILTRATION; INTRACAUDAL; PERINEURAL at 12:56

## 2020-09-11 RX ADMIN — PANTOPRAZOLE SODIUM 40 MG: 40 INJECTION, POWDER, FOR SOLUTION INTRAVENOUS at 09:01

## 2020-09-11 RX ADMIN — ALBUMIN (HUMAN): 12.5 INJECTION, SOLUTION INTRAVENOUS at 13:10

## 2020-09-11 RX ADMIN — PROPOFOL 10 MG: 10 INJECTION, EMULSION INTRAVENOUS at 13:07

## 2020-09-11 RX ADMIN — PANTOPRAZOLE SODIUM 40 MG: 40 INJECTION, POWDER, FOR SOLUTION INTRAVENOUS at 21:23

## 2020-09-11 RX ADMIN — PHENYLEPHRINE HYDROCHLORIDE 100 MCG: 10 INJECTION INTRAVENOUS at 13:28

## 2020-09-11 RX ADMIN — PHENYLEPHRINE HYDROCHLORIDE 100 MCG: 10 INJECTION INTRAVENOUS at 13:11

## 2020-09-11 RX ADMIN — PROPOFOL 10 MG: 10 INJECTION, EMULSION INTRAVENOUS at 13:18

## 2020-09-11 RX ADMIN — PROPOFOL 30 MG: 10 INJECTION, EMULSION INTRAVENOUS at 13:33

## 2020-09-11 RX ADMIN — PROPOFOL 50 MG: 10 INJECTION, EMULSION INTRAVENOUS at 12:56

## 2020-09-11 RX ADMIN — INSULIN LISPRO 1 UNITS: 100 INJECTION, SOLUTION INTRAVENOUS; SUBCUTANEOUS at 21:23

## 2020-09-11 RX ADMIN — PHENYLEPHRINE HYDROCHLORIDE 100 MCG: 10 INJECTION INTRAVENOUS at 13:18

## 2020-09-11 RX ADMIN — TAMSULOSIN HYDROCHLORIDE 0.4 MG: 0.4 CAPSULE ORAL at 17:39

## 2020-09-11 RX ADMIN — PHENYLEPHRINE HYDROCHLORIDE 100 MCG: 10 INJECTION INTRAVENOUS at 13:09

## 2020-09-11 RX ADMIN — EPINEPHRINE 0.08 MG: 1 INJECTION, SOLUTION INTRAMUSCULAR; SUBCUTANEOUS at 13:40

## 2020-09-11 RX ADMIN — VITAMIN D, TAB 1000IU (100/BT) 2000 UNITS: 25 TAB at 06:00

## 2020-09-11 RX ADMIN — EPHEDRINE SULFATE 10 MG: 50 INJECTION, SOLUTION INTRAVENOUS at 13:28

## 2020-09-11 RX ADMIN — EPHEDRINE SULFATE 5 MG: 50 INJECTION, SOLUTION INTRAVENOUS at 13:26

## 2020-09-11 RX ADMIN — PROPOFOL 20 MG: 10 INJECTION, EMULSION INTRAVENOUS at 13:29

## 2020-09-11 RX ADMIN — PROPOFOL 20 MG: 10 INJECTION, EMULSION INTRAVENOUS at 13:22

## 2020-09-11 RX ADMIN — EPHEDRINE SULFATE 5 MG: 50 INJECTION, SOLUTION INTRAVENOUS at 13:35

## 2020-09-11 RX ADMIN — SODIUM CHLORIDE: 0.9 INJECTION, SOLUTION INTRAVENOUS at 12:52

## 2020-09-11 RX ADMIN — EPHEDRINE SULFATE 15 MG: 50 INJECTION, SOLUTION INTRAVENOUS at 13:06

## 2020-09-11 RX ADMIN — PHENYLEPHRINE HYDROCHLORIDE 100 MCG: 10 INJECTION INTRAVENOUS at 13:06

## 2020-09-11 RX ADMIN — PROPOFOL 30 MG: 10 INJECTION, EMULSION INTRAVENOUS at 13:36

## 2020-09-11 NOTE — ASSESSMENT & PLAN NOTE
Initially admitted with stroke like symptoms  MRI negative for acute cva, neuro following  Hold ASA and plavix for now until GI bleed resolves

## 2020-09-11 NOTE — NURSING NOTE
Patient pulled out the IV tubing and attempted to pull out the IV catheter   Explained to pt he needs to keep the IV but still attempted to pull it out  Informed Jessy Moralez and  agreed to use wrist restraints, pt ripped off the soft wrist restraints and became more agitated, hitting the staff  Lucinda at bedside  IV site covered with kerlix due Aricept given the patient able to sleep   02:30 Pt woke up half way out of bed, tried to prevent the pt from getting out of bed  Patient hit the undersigned R forearm and squeezed hard the undersigned R wrist  Attempted to get OOB several times  Solitario Weiner ,made aware and ordered a continual observation for safety

## 2020-09-11 NOTE — ASSESSMENT & PLAN NOTE
Lab Results   Component Value Date    HGBA1C 5 9 (H) 09/10/2020       Recent Labs     09/10/20  0704 09/10/20  1113 09/10/20  1641 09/10/20  1907   POCGLU 93 152* 182* 199*       Blood Sugar Average: Last 72 hrs:  (P) 039 [id number]     Hold home dose metformin  ISS coverage

## 2020-09-11 NOTE — ASSESSMENT & PLAN NOTE
Patient who was on dual antiplatelet therapy due to concern for stroke  Had episode of acute GI bleed yesterday with episode of hypotension and decreased mentation with rapid response called  Patient seen by GI who plan to scope today   Last Hb 7 8 repeat H&H at 9 am  Transfuse if hb < 7

## 2020-09-11 NOTE — CONSULTS
Consult- Nicki Marley 12/3/1930, 80 y o  male MRN: [id number]    Unit/Bed#: ICU 11 Encounter: [id number]    Primary Care Provider: Amalia Berry DO   Date and time admitted to hospital: 9/9/2020  7:28 AM      Consults    * Rectal bleeding  Assessment & Plan  9/10 had multiple large sapna bloody bowel movements, hgb initially stable  Rapid response called at 19:07 for hypotension, SBP 70s down from 120s+ earlier in the day  Repeat hgb 7 8 down from 8 8 and multiple bloody bms, plan for emergent transfusions of PRBC and trend hgb through the night  GI updated and plan to do scopes in AM unless emergently required overnight  Continue bowel prep  ASA and Plavix on hold   Last dose of plavix 9/9 at 21:00   Last dose ASA 9/10 09:00    Type 2 diabetes mellitus, without long-term current use of insulin [facility])  Assessment & Plan  Lab Results   Component Value Date    HGBA1C 5 9 (H) 09/10/2020       Recent Labs     09/10/20  0704 09/10/20  1113 09/10/20  1641 09/10/20  1907   POCGLU 93 152* 182* 199*       Blood Sugar Average: Last 72 hrs:  (P) 197 [id number]     Hold home dose metformin  ISS coverage    Dyslipidemia  Assessment & Plan  Continue home lipitor    Essential hypertension  Assessment & Plan  All home antihypertensives on hold    Left-sided weakness  Assessment & Plan  Initially admitted with stroke like symptoms  MRI negative for acute cva, neuro following  Hold ASA and plavix for now until GI bleed resolves      -------------------------------------------------------------------------------------------------------------  Chief Complaint: Bloody BM, weakness    History of Present Illness     Nicki Marley is a 80 y o  male w/ a pmh DM, HLD, HTN, Carotid stenosis s/p stenting chronically on plavix  He was originally admitted on 9/9 for stroke like symptoms, MRI only showed chronic findings, neuro following   9/10 am he developed BRBPR which he states happens occasionally at home, GI was consulted and planned for scopes 9/11  9/10 evening a rapid response was called for hypotension, repeat hgb showed drop from 8 8 to 7 8, pt was brought to ICU for closer monitor and possibility for emergent scope overnight  GI and family have been updated  History obtained from chart review and the patient   -------------------------------------------------------------------------------------------------------------  Dispo: Transfer to Critical Care     Code Status: Level 3 - DNAR and DNI  --------------------------------------------------------------------------------------------------------------  Review of Systems   Gastrointestinal: Positive for abdominal pain (right margie umbilical area)  All other systems reviewed and are negative  A 12-point, complete review of systems was reviewed and negative except as stated above     Physical Exam  Vitals signs and nursing note reviewed  Constitutional:       Appearance: Normal appearance  HENT:      Head: Normocephalic  Nose: Nose normal       Mouth/Throat:      Mouth: Mucous membranes are moist    Eyes:      Pupils: Pupils are equal, round, and reactive to light  Neck:      Musculoskeletal: Normal range of motion  Cardiovascular:      Rate and Rhythm: Normal rate and regular rhythm  Pulses: Normal pulses  Heart sounds: Normal heart sounds  Pulmonary:      Effort: Pulmonary effort is normal  No respiratory distress  Breath sounds: Normal breath sounds  No stridor  No wheezing  Abdominal:      General: Bowel sounds are normal       Tenderness: There is abdominal tenderness (lower abdomen to palpation)  Musculoskeletal: Normal range of motion  General: No swelling  Skin:     General: Skin is warm and dry  Capillary Refill: Capillary refill takes less than 2 seconds  Neurological:      General: No focal deficit present  Mental Status: He is alert and oriented to person, place, and time  --------------------------------------------------------------------------------------------------------------  Vitals:   Vitals:    09/10/20 1907 09/10/20 1907 09/10/20 1908 09/10/20 1912   BP:    (!) 72/36   Pulse: 63   62   Resp:   (!) 10    Temp:       TempSrc:       SpO2:  99%     Weight:       Height:         Temp  Min: 97 4 °F (36 3 °C)  Max: 98 8 °F (37 1 °C)  IBW: 77 6 kg  Height: 6' (182 9 cm)  Body mass index is 23 92 kg/m²  Laboratory and Diagnostics:  Results from last 7 days   Lab Units 09/10/20  1937 09/10/20  1458 09/10/20  0536 09/09/20  0756   WBC Thousand/uL  --   --  8 85 10 24*   HEMOGLOBIN g/dL 7 8* 8 8* 8 5* 8 7*   HEMATOCRIT %  --  27 7* 27 2* 27 9*   PLATELETS Thousands/uL  --   --  203 208     Results from last 7 days   Lab Units 09/10/20  0536 09/09/20  0756   SODIUM mmol/L 142 142   POTASSIUM mmol/L 4 0 4 1   CHLORIDE mmol/L 106 107   CO2 mmol/L 27 26   ANION GAP mmol/L 9 9   BUN mg/dL 16 19   CREATININE mg/dL 0 97 1 11   CALCIUM mg/dL 7 7* 8 2*   GLUCOSE RANDOM mg/dL 82 123     Results from last 7 days   Lab Units 09/10/20  0536   MAGNESIUM mg/dL 1 5*      Results from last 7 days   Lab Units 09/09/20  0756   INR  1 13   PTT seconds 25      Results from last 7 days   Lab Units 09/09/20 2028 09/09/20  0756   TROPONIN I ng/mL <0 02 <0 02         ABG:    VBG:          Micro:        EKG: NSR  Imaging: I have personally reviewed pertinent reports  and I have personally reviewed pertinent films in PACS      Historical Information   Past Medical History:   Diagnosis Date    Anemia     getting venofer once a week       Arthritis     Cancer ([facility] [address] [facility]     NOSE    Coronary artery disease     carotid stents bilateral    Diabetes mellitus ([facility] [address] [facility]     Hearing aid worn     BILATERAL    History of transfusion 10/11/2015    Hyperlipidemia     Hypertension     Spinal stenosis     hx of L-ROHAN     Past Surgical History:   Procedure Laterality Date    APPENDECTOMY      CAROTID STENT Bilateral     CATARACT EXTRACTION W/ INTRAOCULAR LENS IMPLANT Left 2013    CHOLECYSTECTOMY      CYSTOSCOPY W/ URETERAL STENT PLACEMENT Right     CYSTOSCOPY W/ URETERAL STENT REMOVAL      FEMORAL ARTERY STENT  2013    HEMORRHOID SURGERY      HERNIA REPAIR      HIP ARTHROPLASTY Left 2013    JOINT REPLACEMENT Left     NM TOTAL HIP ARTHROPLASTY Right 2016    Procedure: ARTHROPLASTY HIP TOTAL;  Surgeon: Goldie Sosa MD;  Location: [address] [place] [address];  Service: Orthopedics    SKIN BIOPSY      removal of skin cancer nose    TONSILLECTOMY       Social History   Social History     Substance and Sexual Activity   Alcohol Use Yes    Frequency: Monthly or less    Comment: occ     Social History     Substance and Sexual Activity   Drug Use No     Social History     Tobacco Use   Smoking Status Former Smoker    Packs/day: 1 50    Years: 20 00    Pack years: 30 00    Last attempt to quit: 1996    Years since quittin 6   Smokeless Tobacco Never Used     Exercise History: ambulates without assistance  Family History:   History reviewed  No pertinent family history    I have reviewed this patient's family history and commented on sigificant items within the HPI      Medications:  Current Facility-Administered Medications   Medication Dose Route Frequency    [START ON 2020] atorvastatin (LIPITOR) tablet 40 mg  40 mg Oral Daily With Dinner    cholecalciferol (VITAMIN D3) tablet 2,000 Units  2,000 Units Oral Early Morning    donepezil (ARICEPT) tablet 10 mg  10 mg Oral HS    folic acid (FOLVITE) tablet 400 mcg  400 mcg Oral Early Morning    insulin lispro (HumaLOG) 100 units/mL subcutaneous injection 1-5 Units  1-5 Units Subcutaneous TID AC    insulin lispro (HumaLOG) 100 units/mL subcutaneous injection 1-5 Units  1-5 Units Subcutaneous HS    ondansetron (ZOFRAN) injection 4 mg  4 mg Intravenous Q6H PRN    pantoprazole (PROTONIX) injection 40 mg  40 mg Intravenous Q12H [facility]    sodium chloride 0 9 % bolus 1,000 mL  1,000 mL Intravenous Once    tamsulosin (FLOMAX) capsule 0 4 mg  0 4 mg Oral Daily With Dinner     Home medications:  Prior to Admission Medications   Prescriptions Last Dose Informant Patient Reported? Taking? ALPRAZolam (XANAX) 0 25 mg tablet 2020 at Unknown time  Yes Yes   Sig: Take 0 25 mg by mouth 2 (two) times a day as needed for anxiety   Cholecalciferol (VITAMIN D) 2000 UNITS tablet 2020 at Unknown time  Yes Yes   Sig: Take 2,000 Units by mouth daily in the early morning  Cyanocobalamin (VITAMIN B 12 PO) 2020 at Unknown time  Yes Yes   Sig: Take 1,000 mg by mouth daily   Iron Sucrose (VENOFER IV) 2020 at Unknown time  Yes Yes   Sig: Infuse into a venous catheter  VASCEPA 1 g CAPS 2020 at Unknown time  Yes Yes   Sig: TAKE ONE CAPSULE BY MOUTH TWICE DAILY WITH FOOD SWALLOWING WHOLE  DO NOT CHEW, OPEN, DISSOVE AND/OR CRUSH  VITRON-C  MG TABS 2020 at Unknown time  Yes Yes   Si (two) times a day    atorvastatin (LIPITOR) 40 mg tablet 2020 at Unknown time  Yes Yes   Sig: Take 40 mg by mouth daily at bedtime  clopidogrel (PLAVIX) 75 mg tablet 2020 at Unknown time  Yes Yes   Sig: Take 75 mg by mouth daily at bedtime  donepezil (ARICEPT) 10 mg tablet 2020 at Unknown time  No Yes   Sig: TAKE 1 TABLET (10 MG TOTAL) BY MOUTH DAILY AT BEDTIME STARTING    folic acid (FOLVITE) 901 MCG tablet 2020 at Unknown time  Yes Yes   Sig: Take 400 mcg by mouth daily in the early morning  metFORMIN (GLUCOPHAGE) 500 mg tablet 2020 at Unknown time  Yes Yes   Sig: Take 1,000 mg by mouth 2 (two) times a day with meals    tamsulosin (FLOMAX) 0 4 mg 2020 at Unknown time  Yes Yes   Si 4 mg daily with dinner    valsartan (DIOVAN) 80 mg tablet 2020 at Unknown time  Yes Yes   Sig: Take 80 mg by mouth daily in the early morning  Facility-Administered Medications: None     Allergies:   Allergies   Allergen Reactions    Other Swelling     Bee stings     ------------------------------------------------------------------------------------------------------------  Advance Directive and Living Will:      Power of :    POLST:    ------------------------------------------------------------------------------------------------------------  Care Time Delivered:   Upon my evaluation, this patient had a high probability of imminent or life-threatening deterioration due to HD instability, which required my direct attention, intervention, and personal management  I have personally provided 60 minutes (19:07 to 20:07) of critical care time, exclusive of procedures, teaching, family meetings, and any prior time recorded by providers other than myself  ALISTAIR Cagle        Portions of the record may have been created with voice recognition software  Occasional wrong word or "sound a like" substitutions may have occurred due to the inherent limitations of voice recognition software    Read the chart carefully and recognize, using context, where substitutions have occurred

## 2020-09-11 NOTE — ASSESSMENT & PLAN NOTE
Lab Results   Component Value Date    HGBA1C 5 9 (H) 09/10/2020       Recent Labs     09/10/20  1113 09/10/20  1641 09/10/20  1907 09/10/20  2103   POCGLU 152* 182* 199* 152*       Blood Sugar Average: Last 72 hrs:  (P) 147 [id number]     Hold home dose metformin  ISS coverage

## 2020-09-11 NOTE — ASSESSMENT & PLAN NOTE
9/10 had multiple large sapna bloody bowel movements, hgb initially stable  Rapid response called at 19:07 for hypotension, SBP 70s down from 120s+ earlier in the day  Repeat hgb 7 8 down from 8 8 and multiple bloody bms, plan for emergent transfusions of PRBC and trend hgb through the night  GI updated and plan to do scopes in AM unless emergently required overnight     Continue bowel prep  ASA and Plavix on hold   Last dose of plavix 9/9 at 21:00   Last dose ASA 9/10 09:00

## 2020-09-11 NOTE — PLAN OF CARE
Problem: Potential for Falls  Goal: Patient will remain free of falls  Description: INTERVENTIONS:  - Assess patient frequently for physical needs  -  Identify cognitive and physical deficits and behaviors that affect risk of falls  -  [place] fall precautions as indicated by assessment   - Educate patient/family on patient safety including physical limitations  - Instruct patient to call for assistance with activity based on assessment  - Modify environment to reduce risk of injury  - Consider OT/PT consult to assist with strengthening/mobility  Outcome: Progressing     Problem: Nutrition/Hydration-ADULT  Goal: Nutrient/Hydration intake appropriate for improving, restoring or maintaining nutritional needs  Description: Monitor and assess patient's nutrition/hydration status for malnutrition  Collaborate with interdisciplinary team and initiate plan and interventions as ordered  Monitor patient's weight and dietary intake as ordered or per policy  Utilize nutrition screening tool and intervene as necessary  Determine patient's food preferences and provide high-protein, high-caloric foods as appropriate       INTERVENTIONS:  - Monitor oral intake, urinary output, labs, and treatment plans  - Assess nutrition and hydration status and recommend course of action  - Evaluate amount of meals eaten  - Assist patient with eating if necessary   - Allow adequate time for meals  - Recommend/ encourage appropriate diets, oral nutritional supplements, and vitamin/mineral supplements  - Order, calculate, and assess calorie counts as needed  - Assess need for intravenous fluids  - Provide specific nutrition/hydration education as appropriate  - Include patient/family/caregiver in decisions related to nutrition  Outcome: Progressing     Problem: Prexisting or High Potential for Compromised Skin Integrity  Goal: Skin integrity is maintained or improved  Description: INTERVENTIONS:  - Identify patients at risk for skin breakdown  - Assess and monitor skin integrity  - Assess and monitor nutrition and hydration status  - Monitor labs   - Assess for incontinence   - Turn and reposition patient  - Assist with mobility/ambulation  - Relieve pressure over bony prominences  - Avoid friction and shearing  - Provide appropriate hygiene as needed including keeping skin clean and dry  - Evaluate need for skin moisturizer/barrier cream  - Collaborate with interdisciplinary team   - Patient/family teaching  - Consider wound care consult   Outcome: Progressing     Problem: Neurological Deficit  Goal: Neurological status is stable or improving  Description: Interventions:  - Monitor and assess patient's level of consciousness, motor function, sensory function, and level of assistance needed for ADLs  - Monitor and report changes from baseline  Collaborate with interdisciplinary team to initiate plan and implement interventions as ordered  - Provide and maintain a safe environment  - Consider seizure precautions  - Consider fall precautions  - Consider aspiration precautions  - Consider bleeding precautions  Outcome: Progressing     Problem: Activity Intolerance/Impaired Mobility  Goal: Mobility/activity is maintained at optimum level for patient  Description: Interventions:  - Assess and monitor patient  barriers to mobility and need for assistive/adaptive devices  - Assess patient's emotional response to limitations  - Collaborate with interdisciplinary team and initiate plans and interventions as ordered  - Encourage independent activity per ability   - Maintain proper body alignment  - Perform active/passive rom as tolerated/ordered  - Plan activities to conserve energy   - Turn patient as appropriate  Outcome: Progressing     Problem: Communication Impairment  Goal: Ability to express needs and understand communication  Description: Assess patient's communication skills and ability to understand information    Patient will demonstrate use of effective communication techniques, alternative methods of communication and understanding even if not able to speak  - Encourage communication and provide alternate methods of communication as needed  - Collaborate with case management/ for discharge needs  - Include patient/family/caregiver in decisions related to communication  Outcome: Progressing     Problem: Potential for Aspiration  Goal: Non-ventilated patient's risk of aspiration is minimized  Description: Assess and monitor vital signs, respiratory status, and labs (WBC)  Monitor for signs of aspiration (tachypnea, cough, rales, wheezing, cyanosis, fever)  - Assess and monitor patient's ability to swallow  - Place patient up in chair to eat if possible  - HOB up at 90 degrees to eat if unable to get patient up into chair   - Supervise patient during oral intake  - Instruct patient/ family to take small bites  - Instruct patient/ family to take small single sips when taking liquids  - Follow patient-specific strategies generated by speech pathologist   Outcome: Progressing     Problem: Nutrition  Goal: Nutrition/Hydration status is improving  Description: Monitor and assess patient's nutrition/hydration status for malnutrition (ex- brittle hair, bruises, dry skin, pale skin and conjunctiva, muscle wasting, smooth red tongue, and disorientation)  Collaborate with interdisciplinary team and initiate plan and interventions as ordered  Monitor patient's weight and dietary intake as ordered or per policy  Utilize nutrition screening tool and intervene per policy  Determine patient's food preferences and provide high-protein, high-caloric foods as appropriate  - Assist patient with eating   - Allow adequate time for meals   - Encourage patient to take dietary supplement as ordered  - Collaborate with clinical nutritionist   - Include patient/family/caregiver in decisions related to nutrition    Outcome: Progressing

## 2020-09-11 NOTE — PROGRESS NOTES
Progress Note - Britney Escobar 12/3/1930, 80 y o  male MRN: [id number]    Unit/Bed#: ICU 11 Encounter: [id number]    Primary Care Provider: Nilay Rodriguez DO   Date and time admitted to hospital: 9/9/2020  7:28 AM        Acute blood loss anemia  Assessment & Plan  Patient who was on dual antiplatelet therapy due to concern for stroke  Had episode of acute GI bleed yesterday with episode of hypotension and decreased mentation with rapid response called  Patient seen by GI who plan to scope today  Last Hb 7 8 repeat H&H at 9 am  Transfuse if hb < 7    Carotid disease, bilateral (HCC)  Assessment & Plan  Mild atherosclerotic disease of cavernous and supraclinoid segments of bilateral internal carotid arteries without critical stenosis seen on CTA  There are notes of prior carotid stent placements however patient is not sure of this and I could not find anything on chart review  Confirm with family/care taker  Type 2 diabetes mellitus, without long-term current use of insulin [facility])  Assessment & Plan  Lab Results   Component Value Date    HGBA1C 5 9 (H) 09/10/2020       Recent Labs     09/10/20  1113 09/10/20  1641 09/10/20  1907 09/10/20  2103   POCGLU 152* 182* 199* 152*       Blood Sugar Average: Last 72 hrs:  (P) 147 [id number]     Hold home dose metformin  ISS coverage    Dyslipidemia  Assessment & Plan  Continue home lipitor  Essential hypertension  Assessment & Plan  All home antihypertensives on hold  Left-sided weakness  Assessment & Plan  Patient brought in by family members due to left sided weakness after a fall, CT/CTA of the head and neck were completed without any acute findings noted  MRI consistent with white matter changes suggestive of chronic microangiopathy  No acute intracranial pathology  Patient provided full-dose aspirin and Lipitor 80 mg and was admitted for stroke pathway  On physical exam no focal neuro deficit is noticed  Motor strength ok bilaterally   neuro following, had episode of bright red blood per rectum  Hold ASA and plavix for now until GI bleed resolves, repeat H&H at 9 am  2 units PRBC on hold for hb <7        * Rectal bleeding  Assessment & Plan  9/10 had multiple large sapna bloody bowel movements, hgb initially stable, Rapid response called yesterday for drowsiness and hypotension, SBP 70s down from 120s+ earlier in the day, Repeat hgb 7 8 down from 8 8 and multiple bloody bms, plan for emergent transfusions of PRBC if Hb < 7  ASA and Plavix on hold Last dose of plavix 9/9 at 21:00, Last dose ASA 9/10 09:00  GI following planned scopes today          ----------------------------------------------------------------------------------------  HPI/24hr events: acute GI bleed, patient hemodynamically stable at the moment  @ units PRBC on hold  Disposition: Continue Critical Care   Code Status: Level 3 - DNAR and DNI  ---------------------------------------------------------------------------------------  SUBJECTIVE  Feels well  Review of Systems   Constitutional: Positive for activity change  Negative for fatigue, fever and unexpected weight change  HENT: Negative  Eyes: Negative  Respiratory: Negative for cough, chest tightness and shortness of breath  Cardiovascular: Negative for chest pain, palpitations and leg swelling  Gastrointestinal: Positive for blood in stool  Negative for abdominal pain  Endocrine: Negative  Genitourinary: Negative  Musculoskeletal: Negative  Skin: Negative  Negative for color change and pallor  Allergic/Immunologic: Negative  Neurological: Negative for dizziness and light-headedness  Hematological: Negative  Psychiatric/Behavioral: Negative        Review of systems was reviewed and negative unless stated above in HPI/24-hour events   ---------------------------------------------------------------------------------------  OBJECTIVE    Vitals   Vitals:    09/11/20 0500 09/11/20 0600 09/11/20 0700 09/11/20 0723   BP: 96/52 97/56 97/53    Pulse: 80 87 90    Resp: 18 (!) 23 16    Temp:    (!) 97 1 °F (36 2 °C)   TempSrc:    Temporal   SpO2: 94% 95% 97%    Weight:  74 7 kg (164 lb 10 9 oz)     Height:         Temp (24hrs), Av 7 °F (36 5 °C), Min:97 1 °F (36 2 °C), Max:98 5 °F (36 9 °C)  Current: Temperature: (!) 97 1 °F (36 2 °C)          Respiratory:  SpO2: SpO2: 97 %       Invasive/non-invasive ventilation settings   Respiratory    Lab Data (Last 4 hours)    None         O2/Vent Data (Last 4 hours)    None                Physical Exam  Constitutional:       General: He is not in acute distress  Appearance: Normal appearance  He is normal weight  He is not ill-appearing  HENT:      Head: Normocephalic and atraumatic  Mouth/Throat:      Mouth: Mucous membranes are moist       Pharynx: Oropharynx is clear  Eyes:      Extraocular Movements: Extraocular movements intact  Conjunctiva/sclera: Conjunctivae normal       Pupils: Pupils are equal, round, and reactive to light  Neck:      Musculoskeletal: Normal range of motion and neck supple  Cardiovascular:      Rate and Rhythm: Normal rate and regular rhythm  Heart sounds: Murmur present  Pulmonary:      Effort: Pulmonary effort is normal       Breath sounds: Normal breath sounds  Abdominal:      General: Bowel sounds are normal       Palpations: Abdomen is soft  Musculoskeletal: Normal range of motion  Right lower leg: Edema present  Left lower leg: Edema present  Skin:     General: Skin is warm and dry  Capillary Refill: Capillary refill takes less than 2 seconds  Findings: Bruising present  Neurological:      General: No focal deficit present  Mental Status: He is alert and oriented to person, place, and time           Laboratory and Diagnostics:  Results from last 7 days   Lab Units 20  0540 09/10/20  1937 09/10/20  1458 09/10/20  0536 20  0756   WBC Thousand/uL 12 91*  --   --  8 85 10 24* HEMOGLOBIN g/dL 7 8* 7 8* 8 8* 8 5* 8 7*   HEMATOCRIT % 23 8*  --  27 7* 27 2* 27 9*   PLATELETS Thousands/uL 173  --   --  203 208     Results from last 7 days   Lab Units 09/11/20  0540 09/10/20  0536 09/09/20  0756   SODIUM mmol/L 142 142 142   POTASSIUM mmol/L 4 1 4 0 4 1   CHLORIDE mmol/L 109* 106 107   CO2 mmol/L 25 27 26   ANION GAP mmol/L 8 9 9   BUN mg/dL 18 16 19   CREATININE mg/dL 1 11 0 97 1 11   CALCIUM mg/dL 7 2* 7 7* 8 2*   GLUCOSE RANDOM mg/dL 125 82 123     Results from last 7 days   Lab Units 09/11/20  0540 09/10/20  0536   MAGNESIUM mg/dL 1 9 1 5*   PHOSPHORUS mg/dL 2 1*  --       Results from last 7 days   Lab Units 09/09/20  0756   INR  1 13   PTT seconds 25      Results from last 7 days   Lab Units 09/09/20 2028 09/09/20  0756   TROPONIN I ng/mL <0 02 <0 02         ABG:    VBG:          Micro        EKG: sinus   Imaging: I have personally reviewed pertinent reports  Intake and Output  I/O       09/09 0701 - 09/10 0700 09/10 0701 - 09/11 0700 09/11 0701 - 09/12 0700    P  O   1520     I V  (mL/kg)  660 (8 8)     IV Piggyback  1500     Total Intake(mL/kg)  3680 (49 3)     Urine (mL/kg/hr) 450 570 (0 3)     Stool 0 600     Total Output 450 1170     Net -450 +2510            Unmeasured Urine Occurrence 1 x 5 x     Unmeasured Stool Occurrence 1 x 3 x           Height and Weights   Height: 6' (182 9 cm)  IBW: 77 6 kg  Body mass index is 22 34 kg/m²  Weight (last 2 days)     Date/Time   Weight    09/11/20 0600   74 7 (164 68)    Weight: Simultaneous filing  User may not have seen previous data  at 09/11/20 0600    09/10/20 2156   75 5 (166 45)    09/10/20 07:29:04   80 (176 37)                Nutrition       Diet Orders   (From admission, onward)             Start     Ordered    09/11/20 0001  Diet NPO; Sips with meds  Diet effective midnight     Question Answer Comment   Diet Type NPO    NPO Except: Sips with meds    RD to adjust diet per protocol?  Yes        09/10/20 1614    09/10/20 9496 Room Service  Once     Question:  Type of Service  Answer:  Room Service - Appropriate with Assistance    09/10/20 0954                  Active Medications  Scheduled Meds:  Current Facility-Administered Medications   Medication Dose Route Frequency Provider Last Rate    atorvastatin  40 mg Oral Daily With Dinner Cony Neil PA-C      cholecalciferol  2,000 Units Oral Early Morning Cony Neil PA-C      donepezil  10 mg Oral HS [place], [place]      folic acid  496 mcg Oral Early Morning Cony Bowser [place]      insulin lispro  1-5 Units Subcutaneous TID [facility] [place] Cony Neil PA-C      insulin lispro  1-5 Units Subcutaneous HS Cony Neil PA-C      ondansetron  4 mg Intravenous Q6H PRN Cony Neil PA-C      pantoprazole  40 mg Intravenous Q12H [facility] [place], [place]      tamsulosin  0 4 mg Oral Daily With [employer]       Continuous Infusions:     PRN Meds:   ondansetron, 4 mg, Q6H PRN        Invasive Devices Review  Invasive Devices     Peripheral Intravenous Line            Peripheral IV 09/09/20 Left Antecubital 2 days    Peripheral IV 09/09/20 Left Wrist 2 days                Rationale for remaining devices: none  ---------------------------------------------------------------------------------------  Advance Directive and Living Will:      Power of :    POLST:    ---------------------------------------------------------------------------------------  Care Time Delivered:   No Critical Care time spent       Arnoldo Pierce MD      Portions of the record may have been created with voice recognition software  Occasional wrong word or "sound a like" substitutions may have occurred due to the inherent limitations of voice recognition software    Read the chart carefully and recognize, using context, where substitutions have occurred

## 2020-09-11 NOTE — ASSESSMENT & PLAN NOTE
Mild atherosclerotic disease of cavernous and supraclinoid segments of bilateral internal carotid arteries without critical stenosis seen on CTA  There are notes of prior carotid stent placements however patient is not sure of this and I could not find anything on chart review  Confirm with family/care taker

## 2020-09-11 NOTE — ANESTHESIA PREPROCEDURE EVALUATION
Procedure:  EGD  COLONOSCOPY    Relevant Problems   CARDIO   (+) Aortic stenosis   (+) Essential hypertension      ENDO   (+) Type 2 diabetes mellitus, without long-term current use of insulin (HCC)      GI/HEPATIC   (+) Rectal bleeding      HEMATOLOGY   (+) Acute blood loss anemia      MUSCULOSKELETAL   (+) Primary osteoarthritis of right hip      LEFT VENTRICLE:  Systolic function was normal  Ejection fraction was estimated in the range of 55 % to 60 % to be 60 %  There were no regional wall motion abnormalities  Wall thickness was mildly increased  There was mild concentric hypertrophy  Doppler parameters were consistent with abnormal left ventricular relaxation (grade 1 diastolic dysfunction)      LEFT ATRIUM:  The atrium was mildly dilated      ATRIAL SEPTUM:  No AS defect or large patent foramen ovale was identified by bubble study      RIGHT ATRIUM:  The atrium was mildly dilated      MITRAL VALVE:  There was moderate annular calcification  There was mild regurgitation      AORTIC VALVE:  The valve was probably trileaflet  Leaflets exhibited moderately to markedly increased thickness, moderate calcification, and markedly reduced cuspal separation  Transaortic velocity was increased due to valvular stenosis  There was moderate to severe stenosis  Peak gradient 52, mean 28 mm of hg  CARLA around 1 cm2 by continity, Apeears severe by 2D  There was trace regurgitation      TRICUSPID VALVE:  There was mild to moderate regurgitation  Estimated peak PA pressure was 40 mmHg      PULMONIC VALVE:  There was trace regurgitation      IVC, HEPATIC VEINS:  The respirophasic change in diameter was more than 50%          Anesthesia Plan  ASA Score- 4     Anesthesia Type- IV sedation with anesthesia with ASA Monitors  Additional Monitors:   Airway Plan:           Plan Factors-    Chart reviewed  EKG reviewed  Imaging results reviewed  Existing labs reviewed  Patient summary reviewed      Patient is not a current smoker  Induction-     Postoperative Plan-     Informed Consent- Anesthetic plan and risks discussed with patient

## 2020-09-11 NOTE — ANESTHESIA POSTPROCEDURE EVALUATION
Post-Op Assessment Note    No complications documented      BP   113/56   Temp     Pulse  90   Resp   14   SpO2 99

## 2020-09-11 NOTE — ASSESSMENT & PLAN NOTE
9/10 had multiple large sapna bloody bowel movements, hgb initially stable, Rapid response called yesterday for drowsiness and hypotension, SBP 70s down from 120s+ earlier in the day, Repeat hgb 7 8 down from 8 8 and multiple bloody bms, plan for emergent transfusions of PRBC if Hb < 7  ASA and Plavix on hold Last dose of plavix 9/9 at 21:00, Last dose ASA 9/10 09:00  GI following planned scopes today

## 2020-09-11 NOTE — ASSESSMENT & PLAN NOTE
Patient brought in by family members due to left sided weakness after a fall, CT/CTA of the head and neck were completed without any acute findings noted  MRI consistent with white matter changes suggestive of chronic microangiopathy  No acute intracranial pathology  Patient provided full-dose aspirin and Lipitor 80 mg and was admitted for stroke pathway  On physical exam no focal neuro deficit is noticed  Motor strength ok bilaterally  neuro following, had episode of bright red blood per rectum  Hold ASA and plavix for now until GI bleed resolves, repeat H&H at 9 am  2 units PRBC on hold for hb <7

## 2020-09-12 PROBLEM — F03.90 DEMENTIA (HCC): Status: ACTIVE | Noted: 2020-09-12

## 2020-09-12 LAB
ANION GAP SERPL CALCULATED.3IONS-SCNC: 7 MMOL/L (ref 4–13)
BASOPHILS # BLD AUTO: 0.03 THOUSANDS/ΜL (ref 0–0.1)
BASOPHILS NFR BLD AUTO: 0 % (ref 0–1)
BUN SERPL-MCNC: 12 MG/DL (ref 5–25)
CALCIUM SERPL-MCNC: 6.9 MG/DL (ref 8.3–10.1)
CHLORIDE SERPL-SCNC: 110 MMOL/L (ref 100–108)
CO2 SERPL-SCNC: 26 MMOL/L (ref 21–32)
CREAT SERPL-MCNC: 1.15 MG/DL (ref 0.6–1.3)
EOSINOPHIL # BLD AUTO: 0.06 THOUSAND/ΜL (ref 0–0.61)
EOSINOPHIL NFR BLD AUTO: 1 % (ref 0–6)
ERYTHROCYTE [DISTWIDTH] IN BLOOD BY AUTOMATED COUNT: 16.4 % (ref 11.6–15.1)
FERRITIN SERPL-MCNC: 189 NG/ML (ref 8–388)
GFR SERPL CREATININE-BSD FRML MDRD: 56 ML/MIN/1.73SQ M
GLUCOSE SERPL-MCNC: 113 MG/DL (ref 65–140)
GLUCOSE SERPL-MCNC: 137 MG/DL (ref 65–140)
GLUCOSE SERPL-MCNC: 164 MG/DL (ref 65–140)
GLUCOSE SERPL-MCNC: 193 MG/DL (ref 65–140)
HCT VFR BLD AUTO: 22.2 % (ref 36.5–49.3)
HCT VFR BLD AUTO: 22.4 % (ref 36.5–49.3)
HCT VFR BLD AUTO: 24 % (ref 36.5–49.3)
HGB BLD-MCNC: 7.3 G/DL (ref 12–17)
HGB BLD-MCNC: 7.4 G/DL (ref 12–17)
HGB BLD-MCNC: 7.8 G/DL (ref 12–17)
IMM GRANULOCYTES # BLD AUTO: 0.07 THOUSAND/UL (ref 0–0.2)
IMM GRANULOCYTES NFR BLD AUTO: 1 % (ref 0–2)
IRON SATN MFR SERPL: 30 %
IRON SERPL-MCNC: 47 UG/DL (ref 65–175)
LYMPHOCYTES # BLD AUTO: 1.49 THOUSANDS/ΜL (ref 0.6–4.47)
LYMPHOCYTES NFR BLD AUTO: 13 % (ref 14–44)
MAGNESIUM SERPL-MCNC: 1.7 MG/DL (ref 1.6–2.6)
MCH RBC QN AUTO: 30.5 PG (ref 26.8–34.3)
MCHC RBC AUTO-ENTMCNC: 32.9 G/DL (ref 31.4–37.4)
MCV RBC AUTO: 93 FL (ref 82–98)
MONOCYTES # BLD AUTO: 0.92 THOUSAND/ΜL (ref 0.17–1.22)
MONOCYTES NFR BLD AUTO: 8 % (ref 4–12)
MRSA NOSE QL CULT: NORMAL
NEUTROPHILS # BLD AUTO: 8.87 THOUSANDS/ΜL (ref 1.85–7.62)
NEUTS SEG NFR BLD AUTO: 77 % (ref 43–75)
NRBC BLD AUTO-RTO: 0 /100 WBCS
PHOSPHATE SERPL-MCNC: 2.4 MG/DL (ref 2.3–4.1)
PLATELET # BLD AUTO: 142 THOUSANDS/UL (ref 149–390)
PMV BLD AUTO: 10.1 FL (ref 8.9–12.7)
POTASSIUM SERPL-SCNC: 4 MMOL/L (ref 3.5–5.3)
RBC # BLD AUTO: 2.39 MILLION/UL (ref 3.88–5.62)
SODIUM SERPL-SCNC: 143 MMOL/L (ref 136–145)
TIBC SERPL-MCNC: 159 UG/DL (ref 250–450)
WBC # BLD AUTO: 11.44 THOUSAND/UL (ref 4.31–10.16)

## 2020-09-12 PROCEDURE — 99232 SBSQ HOSP IP/OBS MODERATE 35: CPT | Performed by: INTERNAL MEDICINE

## 2020-09-12 PROCEDURE — 85025 COMPLETE CBC W/AUTO DIFF WBC: CPT | Performed by: PHYSICIAN ASSISTANT

## 2020-09-12 PROCEDURE — 80048 BASIC METABOLIC PNL TOTAL CA: CPT | Performed by: PHYSICIAN ASSISTANT

## 2020-09-12 PROCEDURE — C9113 INJ PANTOPRAZOLE SODIUM, VIA: HCPCS | Performed by: NURSE PRACTITIONER

## 2020-09-12 PROCEDURE — 82948 REAGENT STRIP/BLOOD GLUCOSE: CPT

## 2020-09-12 PROCEDURE — 83735 ASSAY OF MAGNESIUM: CPT | Performed by: PHYSICIAN ASSISTANT

## 2020-09-12 PROCEDURE — 85018 HEMOGLOBIN: CPT | Performed by: NURSE PRACTITIONER

## 2020-09-12 PROCEDURE — 85014 HEMATOCRIT: CPT | Performed by: NURSE PRACTITIONER

## 2020-09-12 PROCEDURE — 84100 ASSAY OF PHOSPHORUS: CPT | Performed by: PHYSICIAN ASSISTANT

## 2020-09-12 PROCEDURE — C9113 INJ PANTOPRAZOLE SODIUM, VIA: HCPCS | Performed by: PHYSICIAN ASSISTANT

## 2020-09-12 RX ORDER — CALCIUM GLUCONATE 20 MG/ML
2 INJECTION, SOLUTION INTRAVENOUS ONCE
Status: COMPLETED | OUTPATIENT
Start: 2020-09-12 | End: 2020-09-12

## 2020-09-12 RX ORDER — CLOPIDOGREL BISULFATE 75 MG/1
75 TABLET ORAL DAILY
Status: DISCONTINUED | OUTPATIENT
Start: 2020-09-13 | End: 2020-09-14

## 2020-09-12 RX ORDER — MAGNESIUM SULFATE HEPTAHYDRATE 40 MG/ML
2 INJECTION, SOLUTION INTRAVENOUS ONCE
Status: COMPLETED | OUTPATIENT
Start: 2020-09-12 | End: 2020-09-12

## 2020-09-12 RX ORDER — FERROUS SULFATE 325(65) MG
325 TABLET ORAL
Status: DISCONTINUED | OUTPATIENT
Start: 2020-09-12 | End: 2020-09-18 | Stop reason: HOSPADM

## 2020-09-12 RX ORDER — SENNOSIDES 8.6 MG
1 TABLET ORAL 2 TIMES DAILY
Status: DISCONTINUED | OUTPATIENT
Start: 2020-09-12 | End: 2020-09-18 | Stop reason: HOSPADM

## 2020-09-12 RX ORDER — CLOPIDOGREL BISULFATE 75 MG/1
75 TABLET ORAL DAILY
Status: DISCONTINUED | OUTPATIENT
Start: 2020-09-12 | End: 2020-09-12

## 2020-09-12 RX ADMIN — FERROUS SULFATE TAB 325 MG (65 MG ELEMENTAL FE) 325 MG: 325 (65 FE) TAB at 12:09

## 2020-09-12 RX ADMIN — DONEPEZIL HYDROCHLORIDE 10 MG: 5 TABLET ORAL at 21:54

## 2020-09-12 RX ADMIN — INSULIN LISPRO 1 UNITS: 100 INJECTION, SOLUTION INTRAVENOUS; SUBCUTANEOUS at 21:53

## 2020-09-12 RX ADMIN — VITAMIN D, TAB 1000IU (100/BT) 2000 UNITS: 25 TAB at 05:34

## 2020-09-12 RX ADMIN — FERROUS SULFATE TAB 325 MG (65 MG ELEMENTAL FE) 325 MG: 325 (65 FE) TAB at 16:17

## 2020-09-12 RX ADMIN — PANTOPRAZOLE SODIUM 40 MG: 40 INJECTION, POWDER, FOR SOLUTION INTRAVENOUS at 09:15

## 2020-09-12 RX ADMIN — STANDARDIZED SENNA CONCENTRATE 8.6 MG: 8.6 TABLET ORAL at 17:14

## 2020-09-12 RX ADMIN — TAMSULOSIN HYDROCHLORIDE 0.4 MG: 0.4 CAPSULE ORAL at 16:16

## 2020-09-12 RX ADMIN — MAGNESIUM SULFATE HEPTAHYDRATE 2 G: 40 INJECTION, SOLUTION INTRAVENOUS at 11:13

## 2020-09-12 RX ADMIN — Medication 400 MCG: at 05:34

## 2020-09-12 RX ADMIN — PANTOPRAZOLE SODIUM 40 MG: 40 INJECTION, POWDER, FOR SOLUTION INTRAVENOUS at 21:53

## 2020-09-12 RX ADMIN — STANDARDIZED SENNA CONCENTRATE 8.6 MG: 8.6 TABLET ORAL at 09:16

## 2020-09-12 RX ADMIN — CALCIUM GLUCONATE 2 G: 20 INJECTION, SOLUTION INTRAVENOUS at 11:18

## 2020-09-12 RX ADMIN — FERROUS SULFATE TAB 325 MG (65 MG ELEMENTAL FE) 325 MG: 325 (65 FE) TAB at 09:16

## 2020-09-12 RX ADMIN — ATORVASTATIN CALCIUM 40 MG: 40 TABLET, FILM COATED ORAL at 16:16

## 2020-09-12 RX ADMIN — INSULIN LISPRO 1 UNITS: 100 INJECTION, SOLUTION INTRAVENOUS; SUBCUTANEOUS at 11:22

## 2020-09-12 NOTE — ASSESSMENT & PLAN NOTE
· Patient brought in by family members due to left sided weakness after a fall, CT/CTA of the head and neck were completed without any acute findings noted  MRI consistent with white matter changes suggestive of chronic microangiopathy  No acute intracranial pathology  Patient provided full-dose aspirin and Lipitor 80 mg and was admitted for stroke pathway  On physical exam no focal neuro deficit is noticed  Motor strength ok bilaterally  neuro following, had episode of bright red blood per rectum   Hold ASA and plavix for now until GI bleed resolves, repeat H&H at 9 am  2 units PRBC on hold for hb <7    · No evidence of acute stroke at this time  · Discuss with GI regarding resumption of asa/plavix

## 2020-09-12 NOTE — ASSESSMENT & PLAN NOTE
· Patient who was on dual antiplatelet therapy due to concern for stroke  Had episode of acute GI bleed yesterday with episode of hypotension and decreased mentation with rapid response called  Patient seen by GI who plan to scope today   Last Hb 7 8 repeat H&H at 9 am  Transfuse if hb < 7  · Pt s/p 2UPBC - Hgb stable at this time

## 2020-09-12 NOTE — ASSESSMENT & PLAN NOTE
Lab Results   Component Value Date    HGBA1C 5 9 (H) 09/10/2020       Recent Labs     09/10/20  2103 09/11/20  1215 09/11/20  1728 09/11/20 2028   POCGLU 152* 139 136 207*       Blood Sugar Average: Last 72 hrs:  (P) 151 7     Hold home dose metformin  ISS coverage  Goal BS<180

## 2020-09-12 NOTE — PROGRESS NOTES
Report called to accepting MADDI Alcala  Patient informed of transfer to new room 202  Belongings inventoried  Left message on daughter in law Wilmer Palmer [phone] with call back number of patient's transfer to room 202  Patient transferred via floor bed

## 2020-09-12 NOTE — ASSESSMENT & PLAN NOTE
· Mild atherosclerotic disease of cavernous and supraclinoid segments of bilateral internal carotid arteries without critical stenosis seen on CTA  There are notes of prior carotid stent placements however patient is not sure of this and I could not find anything on chart review  Confirm with family/care taker    · Pt will need to resume his asa/plavix

## 2020-09-12 NOTE — ASSESSMENT & PLAN NOTE
· 9/10 had multiple large sapna bloody bowel movements, hgb initially stable, Rapid response called yesterday for drowsiness and hypotension, SBP 70s down from 120s+ earlier in the day, Repeat hgb 7 8 down from 8 8 and multiple bloody bms, plan for emergent transfusions of PRBC if Hb < 7  ASA and Plavix on hold Last dose of plavix 9/9 at 21:00, Last dose ASA 9/10 09:00   · S/p EGD and colonoscopy - There were multiple diverticula was active bleeding noted from 1 small diverticulum in the descending colon  This was near the anal verge    Hemoclips and epinephrine was injected with cessation of bleeding  · Hgb stable at this time - no further bleeding noted  · Resume diet

## 2020-09-12 NOTE — PROGRESS NOTES
Progress Note - Paulette Lombardi 12/3/1930, 80 y o  male MRN: [id number]    Unit/Bed#: ICU 11 Encounter: [id number]    Primary Care Provider: Usama Gomez DO   Date and time admitted to hospital: 9/9/2020  7:28 AM    Principal Problem:    Rectal bleeding  Active Problems:    Acute blood loss anemia    Left-sided weakness    Dementia ([address] )    Essential hypertension    Dyslipidemia    Type 2 diabetes mellitus, without long-term current use of insulin (HCC)    Carotid disease, bilateral (HCC)    Aortic stenosis  Resolved Problems:    MIKE (iron deficiency anemia)        * Rectal bleeding  Assessment & Plan  · 9/10 had multiple large sapna bloody bowel movements, hgb initially stable, Rapid response called yesterday for drowsiness and hypotension, SBP 70s down from 120s+ earlier in the day, Repeat hgb 7 8 down from 8 8 and multiple bloody bms, plan for emergent transfusions of PRBC if Hb < 7  ASA and Plavix on hold Last dose of plavix 9/9 at 21:00, Last dose ASA 9/10 09:00   · S/p EGD and colonoscopy - There were multiple diverticula was active bleeding noted from 1 small diverticulum in the descending colon  This was near the anal verge  Hemoclips and epinephrine was injected with cessation of bleeding  · Hgb stable at this time - no further bleeding noted  · Resume diet      Acute blood loss anemia  Assessment & Plan  · Patient who was on dual antiplatelet therapy due to concern for stroke  Had episode of acute GI bleed yesterday with episode of hypotension and decreased mentation with rapid response called  Patient seen by GI who plan to scope today  Last Hb 7 8 repeat H&H at 9 am  Transfuse if hb < 7  · Pt s/p 2UPBC - Hgb stable at this time     Left-sided weakness  Assessment & Plan  · Patient brought in by family members due to left sided weakness after a fall, CT/CTA of the head and neck were completed without any acute findings noted   MRI consistent with white matter changes suggestive of chronic microangiopathy  No acute intracranial pathology  Patient provided full-dose aspirin and Lipitor 80 mg and was admitted for stroke pathway  On physical exam no focal neuro deficit is noticed  Motor strength ok bilaterally  neuro following, had episode of bright red blood per rectum  Hold ASA and plavix for now until GI bleed resolves, repeat H&H at 9 am  2 units PRBC on hold for hb <7    · No evidence of acute stroke at this time  · Discuss with GI regarding resumption of asa/plavix      Dementia ([address] )  Assessment & Plan  Continue home aricept  Pt gets confused at night  Continue strict environmental controls    Aortic stenosis  Assessment & Plan  Avoid tachycardia and hypotension    Carotid disease, bilateral (HCC)  Assessment & Plan  · Mild atherosclerotic disease of cavernous and supraclinoid segments of bilateral internal carotid arteries without critical stenosis seen on CTA  There are notes of prior carotid stent placements however patient is not sure of this and I could not find anything on chart review  Confirm with family/care taker  · Pt will need to resume his asa/plavix     Type 2 diabetes mellitus, without long-term current use of insulin [facility])  Assessment & Plan  Lab Results   Component Value Date    HGBA1C 5 9 (H) 09/10/2020       Recent Labs     09/10/20  2103 09/11/20  1215 09/11/20  1728 09/11/20 2028   POCGLU 152* 139 136 207*       Blood Sugar Average: Last 72 hrs:  (P) 151 7     Hold home dose metformin  ISS coverage  Goal BS<180    Dyslipidemia  Assessment & Plan  Continue home lipitor  Essential hypertension  Assessment & Plan  All home antihypertensives on hold  Hold for now - pt with relative hypotension - likely related to ABLA      ----------------------------------------------------------------------------------------  HPI/24hr events: Colonoscopy with bleeding divirticuli noted with successful intervetion  Bleeding stopped  No further bleeding overnight  Pt with confusion o/n  MRI w/o acute pathology  Disposition: Transfer to Med-Surg   Code Status: Level 3 - DNAR and DNI  ---------------------------------------------------------------------------------------  SUBJECTIVE  "I want cake"      ---------------------------------------------------------------------------------------  OBJECTIVE    Vitals   Vitals:    20 2124 20 2200 20 2300 20 0000   BP: 104/50 101/50 106/53 (!) 87/44   BP Location:       Pulse: 103 94 90 79   Resp: 22 22 22 18   Temp:   97 6 °F (36 4 °C)    TempSrc:       SpO2: 94% 94% 97% 98%   Weight:       Height:         Temp (24hrs), Av 3 °F (36 3 °C), Min:95 6 °F (35 3 °C), Max:98 1 °F (36 7 °C)  Current: Temperature: 97 6 °F (36 4 °C)          Respiratory:  SpO2: SpO2: 98 %, SpO2 Activity: SpO2 Activity: At Rest, SpO2 Device: O2 Device: None (Room air)       Invasive/non-invasive ventilation settings   Respiratory    Lab Data (Last 4 hours)    None         O2/Vent Data (Last 4 hours)    None                Physical Exam  Vitals signs and nursing note reviewed  Constitutional:       Appearance: He is not diaphoretic  HENT:      Head: Normocephalic and atraumatic  Mouth/Throat:      Mouth: Mucous membranes are moist    Eyes:      General: No scleral icterus  Cardiovascular:      Rate and Rhythm: Normal rate and regular rhythm  Heart sounds: Murmur present  Pulmonary:      Effort: Pulmonary effort is normal       Breath sounds: Normal breath sounds  Abdominal:      General: Abdomen is flat  Bowel sounds are normal       Palpations: Abdomen is soft  Musculoskeletal:      Right lower leg: Edema present  Left lower leg: Edema present  Skin:     General: Skin is warm and dry  Capillary Refill: Capillary refill takes less than 2 seconds  Coloration: Skin is pale  Neurological:      Mental Status: He is alert  He is disoriented           Laboratory and Diagnostics:  Results from last 7 days   Lab Units 09/11/20  1753 09/11/20  0901 09/11/20  0540 09/10/20  1937 09/10/20  1458 09/10/20  0536 09/09/20  0756   WBC Thousand/uL  --   --  12 91*  --   --  8 85 10 24*   HEMOGLOBIN g/dL 7 9* 8 1* 7 8* 7 8* 8 8* 8 5* 8 7*   HEMATOCRIT %  --  27 3* 23 8*  --  27 7* 27 2* 27 9*   PLATELETS Thousands/uL  --   --  173  --   --  203 208     Results from last 7 days   Lab Units 09/11/20  0540 09/10/20  0536 09/09/20  0756   SODIUM mmol/L 142 142 142   POTASSIUM mmol/L 4 1 4 0 4 1   CHLORIDE mmol/L 109* 106 107   CO2 mmol/L 25 27 26   ANION GAP mmol/L 8 9 9   BUN mg/dL 18 16 19   CREATININE mg/dL 1 11 0 97 1 11   CALCIUM mg/dL 7 2* 7 7* 8 2*   GLUCOSE RANDOM mg/dL 125 82 123     Results from last 7 days   Lab Units 09/11/20  0540 09/10/20  0536   MAGNESIUM mg/dL 1 9 1 5*   PHOSPHORUS mg/dL 2 1*  --       Results from last 7 days   Lab Units 09/09/20  0756   INR  1 13   PTT seconds 25      Results from last 7 days   Lab Units 09/09/20 2028 09/09/20  0756   TROPONIN I ng/mL <0 02 <0 02         ABG:    VBG:          Micro        EKG: NSR on tele  Imaging:   MRI brain wo contrast   Final Result      White matter changes suggestive of chronic microangiopathy  No acute intracranial pathology  Workstation performed: [id number]         CTA stroke alert (head/neck)   Final Result      1  Patent major vasculature of [place] without critical stenosis  Atherosclerotic disease of intracranial internal carotid and vertebral arteries  2   No hemodynamically significant stenosis of major cervical vasculature  Patent bilateral cervical carotid artery stents  Findings were directly discussed with Daryn Anderson on 9/9/2020 7:50 AM                      Workstation performed: [id number]         CT stroke alert brain   Final Result      1  No acute intracranial CT abnormality  2   Chronic white matter microangiopathy              Findings were directly discussed with JONNATHAN Patiño on 9/9/2020 7:50 AM  Workstation performed: [id number]            I have personally reviewed pertinent reports  and I have personally reviewed pertinent films in PACS    Intake and Output  I/O       09/10 0701 - 09/11 0700 09/11 0701 - 09/12 0700    P  O  1520 120    I V  (mL/kg) 660 (8 8) 842 5 (11 3)    Blood  378    IV Piggyback 1500 250    Total Intake(mL/kg) 3680 (49 3) 1590 5 (21 3)    Urine (mL/kg/hr) 570 (0 3) 1250 (0 7)    Stool 600 0    Total Output 1170 1250    Net +2510 +340 5          Unmeasured Urine Occurrence 5 x     Unmeasured Stool Occurrence 3 x 1 x          Height and Weights   Height: 6' (182 9 cm)  IBW: 77 6 kg  Body mass index is 22 34 kg/m²  Weight (last 2 days)     Date/Time   Weight    09/11/20 0600   74 7 (164 68)    Weight: Simultaneous filing  User may not have seen previous data  at 09/11/20 0600    09/10/20 2156   75 5 (166 45)    09/10/20 07:29:04   80 (176 37)                Nutrition       Diet Orders   (From admission, onward)             Start     Ordered    09/11/20 1934  Diet Regular; Regular House  Diet effective now     Question Answer Comment   Diet Type Regular    Regular Regular House    RD to adjust diet per protocol?  Yes        09/11/20 1934    09/10/20 0955  Room Service  Once     Question:  Type of Service  Answer:  Room Service - Appropriate with Assistance    09/10/20 0954                  Active Medications  Scheduled Meds:  Current Facility-Administered Medications   Medication Dose Route Frequency Provider Last Rate    atorvastatin  40 mg Oral Daily With Dinner Tracy Alejo      cholecalciferol  2,000 Units Oral Early Morning Juliane Ho PA-C      donepezil  10 mg Oral HS Juliane Ho PA-C      ferrous sulfate  325 mg Oral TID With Meals Julio C Ernst PA-C      folic acid  580 mcg Oral Early Morning Juliane Ho PA-C      insulin lispro  1-5 Units Subcutaneous TID  Juliane Ho PA-C      insulin lispro  1-5 Units Subcutaneous HS Juliane Ho ALISTAIR      ondansetron  4 mg Intravenous Q6H PRN ALISTAIR Cagle      pantoprazole  40 mg Intravenous Q12H [address] [place], [place]      senna  1 tablet Oral BID [place]      tamsulosin  0 4 mg Oral Daily With [employer]IR       Continuous Infusions:     PRN Meds:   ondansetron, 4 mg, Q6H PRN        Invasive Devices Review  Invasive Devices     Peripheral Intravenous Line            Peripheral IV 09/11/20 Left Forearm less than 1 day    Peripheral IV 09/11/20 Left Wrist less than 1 day                  ---------------------------------------------------------------------------------------  Advance Directive and Living Will:      Power of :    POLST:    ---------------------------------------------------------------------------------------        Tana Teran PA-C      Portions of the record may have been created with voice recognition software  Occasional wrong word or "sound a like" substitutions may have occurred due to the inherent limitations of voice recognition software    Read the chart carefully and recognize, using context, where substitutions have occurred

## 2020-09-12 NOTE — PROGRESS NOTES
Progress Note - Enrrique Magallanes 80 y o  male MRN: [id number]    Unit/Bed#: ICU 11 Encounter: [id number]        Subjective:     Patient with no BMs overnight per my discussion with patient's nurse  Patient is still intermittently hypotensive  This morning, his pressure systolically have been above 100  Patient was eating breakfast at the bedside  He has no complaints at this time  We went over his colonoscopy results  I also left patient's daughter-in-law a message with update  ROS: As noted in the HPI, otherwise all others negative  Objective:     Vitals: Blood pressure 110/56, pulse 81, temperature 98 6 °F (37 °C), temperature source Tympanic, resp  rate 20, height 6' (1 829 m), weight 77 7 kg (171 lb 4 8 oz), SpO2 98 %  ,Body mass index is 23 23 kg/m²  Intake/Output Summary (Last 24 hours) at 9/12/2020 1300  Last data filed at 9/12/2020 1238  Gross per 24 hour   Intake 2220 5 ml   Output 1850 ml   Net 370 5 ml       Physical Exam:     General Appearance: Alert and oriented x 3   In no respiratory distress  Lungs: Clear to auscultation bilaterally, no rales or rhonchi  Heart: Regular rate and rhythm, S1, S2 normal, no murmur, click, rub or gallop  Abdomen: Soft, non-tender, non-distended; bowel sounds normal; no masses or no organomegaly  Extremities: No cyanosis, edema    Invasive Devices     Peripheral Intravenous Line            Peripheral IV 09/11/20 Left Wrist less than 1 day                Lab Results:  Results from last 7 days   Lab Units 09/12/20  1155 09/12/20  0534   WBC Thousand/uL  --  11 44*   HEMOGLOBIN g/dL 7 4* 7 3*   HEMATOCRIT % 22 4* 22 2*   PLATELETS Thousands/uL  --  142*   NEUTROS PCT %  --  77*   LYMPHS PCT %  --  13*   MONOS PCT %  --  8   EOS PCT %  --  1     Results from last 7 days   Lab Units 09/12/20  0534   POTASSIUM mmol/L 4 0   CHLORIDE mmol/L 110*   CO2 mmol/L 26   BUN mg/dL 12   CREATININE mg/dL 1 15   CALCIUM mg/dL 6 9*     Results from last 7 days   Lab Units 09/09/20  0756   INR  1 13           Imaging Studies: I have personally reviewed pertinent imaging studies  Mri Brain Wo Contrast    Result Date: 9/10/2020  Impression: White matter changes suggestive of chronic microangiopathy  No acute intracranial pathology  Workstation performed: [id number]     Ct Stroke Alert Brain    Result Date: 9/9/2020  Impression: 1  No acute intracranial CT abnormality  2   Chronic white matter microangiopathy  Findings were directly discussed with Pattie Dietz on 9/9/2020 7:50 AM  Workstation performed: [id number]     Cta Stroke Alert (head/neck)    Result Date: 9/9/2020  Impression: 1  Patent major vasculature of [place] without critical stenosis  Atherosclerotic disease of intracranial internal carotid and vertebral arteries  2   No hemodynamically significant stenosis of major cervical vasculature  Patent bilateral cervical carotid artery stents  Findings were directly discussed with Pattie Dietz on 9/9/2020 7:50 AM  Workstation performed: [id number]         Assessment and Plan:     1) Acute blood-loss anemia, hematochezia likely secondary to diverticular bleeding - Prior to planned discharge, patient began having hematochezia on Thursday afternoon  Because the patient became hypotensive, he then was transferred to the ICU  Yesterday, we performed colonoscopy revealing active bleeding within the colon  In the descending colon, there was a single diverticulum that appear to be actively bleeding about 45-50 cm from the anal verge  This was treated with clip and epinephrine  The site was tattooed in the event that patient were to have recurrence of overt bleeding  Fortunately, there have been no further episodes since his bowel preparation  His vitals are improving  The patient himself feels well    - Continue monitor hemoglobin closely and transfuse as necessary with a goal hemoglobin above 7  - Consider restarting Plavix if patient needs to be on this secondary to carotid disease with very close monitoring

## 2020-09-12 NOTE — ASSESSMENT & PLAN NOTE
All home antihypertensives on hold    Hold for now - pt with relative hypotension - likely related to [facility]

## 2020-09-13 LAB
ANION GAP SERPL CALCULATED.3IONS-SCNC: 6 MMOL/L (ref 4–13)
BUN SERPL-MCNC: 9 MG/DL (ref 5–25)
CALCIUM SERPL-MCNC: 7.5 MG/DL (ref 8.3–10.1)
CHLORIDE SERPL-SCNC: 110 MMOL/L (ref 100–108)
CO2 SERPL-SCNC: 27 MMOL/L (ref 21–32)
CREAT SERPL-MCNC: 1.13 MG/DL (ref 0.6–1.3)
ERYTHROCYTE [DISTWIDTH] IN BLOOD BY AUTOMATED COUNT: 16.3 % (ref 11.6–15.1)
GFR SERPL CREATININE-BSD FRML MDRD: 57 ML/MIN/1.73SQ M
GLUCOSE SERPL-MCNC: 115 MG/DL (ref 65–140)
GLUCOSE SERPL-MCNC: 115 MG/DL (ref 65–140)
GLUCOSE SERPL-MCNC: 122 MG/DL (ref 65–140)
GLUCOSE SERPL-MCNC: 140 MG/DL (ref 65–140)
GLUCOSE SERPL-MCNC: 191 MG/DL (ref 65–140)
HCT VFR BLD AUTO: 22.8 % (ref 36.5–49.3)
HCT VFR BLD AUTO: 23.5 % (ref 36.5–49.3)
HGB BLD-MCNC: 7.4 G/DL (ref 12–17)
HGB BLD-MCNC: 7.5 G/DL (ref 12–17)
MAGNESIUM SERPL-MCNC: 1.8 MG/DL (ref 1.6–2.6)
MCH RBC QN AUTO: 30.6 PG (ref 26.8–34.3)
MCHC RBC AUTO-ENTMCNC: 31.9 G/DL (ref 31.4–37.4)
MCV RBC AUTO: 96 FL (ref 82–98)
PLATELET # BLD AUTO: 164 THOUSANDS/UL (ref 149–390)
PMV BLD AUTO: 10.6 FL (ref 8.9–12.7)
POTASSIUM SERPL-SCNC: 3.8 MMOL/L (ref 3.5–5.3)
RBC # BLD AUTO: 2.45 MILLION/UL (ref 3.88–5.62)
SODIUM SERPL-SCNC: 143 MMOL/L (ref 136–145)
WBC # BLD AUTO: 9.41 THOUSAND/UL (ref 4.31–10.16)

## 2020-09-13 PROCEDURE — 80048 BASIC METABOLIC PNL TOTAL CA: CPT | Performed by: NURSE PRACTITIONER

## 2020-09-13 PROCEDURE — 85014 HEMATOCRIT: CPT | Performed by: NURSE PRACTITIONER

## 2020-09-13 PROCEDURE — 99232 SBSQ HOSP IP/OBS MODERATE 35: CPT | Performed by: FAMILY MEDICINE

## 2020-09-13 PROCEDURE — 83735 ASSAY OF MAGNESIUM: CPT | Performed by: NURSE PRACTITIONER

## 2020-09-13 PROCEDURE — 97535 SELF CARE MNGMENT TRAINING: CPT

## 2020-09-13 PROCEDURE — 82948 REAGENT STRIP/BLOOD GLUCOSE: CPT

## 2020-09-13 PROCEDURE — 85027 COMPLETE CBC AUTOMATED: CPT | Performed by: NURSE PRACTITIONER

## 2020-09-13 PROCEDURE — 85018 HEMOGLOBIN: CPT | Performed by: NURSE PRACTITIONER

## 2020-09-13 PROCEDURE — C9113 INJ PANTOPRAZOLE SODIUM, VIA: HCPCS | Performed by: NURSE PRACTITIONER

## 2020-09-13 RX ADMIN — FERROUS SULFATE TAB 325 MG (65 MG ELEMENTAL FE) 325 MG: 325 (65 FE) TAB at 11:53

## 2020-09-13 RX ADMIN — DONEPEZIL HYDROCHLORIDE 10 MG: 5 TABLET ORAL at 21:40

## 2020-09-13 RX ADMIN — STANDARDIZED SENNA CONCENTRATE 8.6 MG: 8.6 TABLET ORAL at 09:08

## 2020-09-13 RX ADMIN — VITAMIN D, TAB 1000IU (100/BT) 2000 UNITS: 25 TAB at 05:50

## 2020-09-13 RX ADMIN — PANTOPRAZOLE SODIUM 40 MG: 40 INJECTION, POWDER, FOR SOLUTION INTRAVENOUS at 21:40

## 2020-09-13 RX ADMIN — FERROUS SULFATE TAB 325 MG (65 MG ELEMENTAL FE) 325 MG: 325 (65 FE) TAB at 09:07

## 2020-09-13 RX ADMIN — FERROUS SULFATE TAB 325 MG (65 MG ELEMENTAL FE) 325 MG: 325 (65 FE) TAB at 17:07

## 2020-09-13 RX ADMIN — INSULIN LISPRO 1 UNITS: 100 INJECTION, SOLUTION INTRAVENOUS; SUBCUTANEOUS at 11:53

## 2020-09-13 RX ADMIN — STANDARDIZED SENNA CONCENTRATE 8.6 MG: 8.6 TABLET ORAL at 17:08

## 2020-09-13 RX ADMIN — PANTOPRAZOLE SODIUM 40 MG: 40 INJECTION, POWDER, FOR SOLUTION INTRAVENOUS at 09:08

## 2020-09-13 RX ADMIN — ATORVASTATIN CALCIUM 40 MG: 40 TABLET, FILM COATED ORAL at 17:07

## 2020-09-13 RX ADMIN — CLOPIDOGREL BISULFATE 75 MG: 75 TABLET ORAL at 09:11

## 2020-09-13 RX ADMIN — Medication 400 MCG: at 05:50

## 2020-09-13 RX ADMIN — TAMSULOSIN HYDROCHLORIDE 0.4 MG: 0.4 CAPSULE ORAL at 17:08

## 2020-09-13 NOTE — PROGRESS NOTES
[address] Internal Medicine Progress Note  Patient: Liz Kang 80 y o  male   MRN: [id number]  PCP: Shayne Thurston DO  Unit/Bed#: [address] [place] [address] Encounter: [id number]  Date Of Visit: 09/13/20    Problem List:    Principal Problem:    Rectal bleeding  Active Problems:    Left-sided weakness    Acute blood loss anemia    Essential hypertension    Dyslipidemia    Type 2 diabetes mellitus, without long-term current use of insulin (HCC)    Carotid disease, bilateral ([address] )    Aortic stenosis    Dementia ([facility] [address] )      Assessment & Plan:    Left-sided weakness  Assessment & Plan  Patient presented with left-sided weakness along with left facial droop, status post fall at home   CT of head revealed chronic changes   MRI negative for acute stroke  Symptoms likely TIA   Discontinued full-dose ASA as per Neurology  Plavix was held previously due to GI bleed as benefits outweigh the risk of stroke but has been restarted  Continue statin   He was on neuro checks    Lipid panel - LDL 26, HgA1C 5 9   CTA head/neck showed 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   Echo with bubble study showed EF of 55-60% with grade 1 diastolic dysfunction  No ASD or PFO noted   Neuro eval appreciated      * Rectal bleeding  Assessment & Plan  Patient had multiple episodes of rectal bleeding on 09/10  Hemoglobin was initially stable  Rapid response was later called due to lethargy and hypotension and patient transferred to the ICU  Status post EGD and colonoscopy  Colonoscopy showed multiple diverticula in the left colon and few in the right colon  One small diverticulum in the descending colon was actively bleeding  Given epi and hemoclips placed  Tattoo was placed  EGD showed 1 cm hiatal hernia, open Schatzki's ring    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 which was cauterized with APC  Continue on IV Protonix b i d  for now    Acute blood loss anemia  Assessment & Plan  Patient with history of iron deficiency anemia  He gets IV iron infusions once per month by his hematologist  Hemoglobin trended down to 7 8 with his episodes of GI bleed  As per ICU note, he received a total of 3 units of PRBC transfusion during this admission  Hemoglobin today 7 5   continue ferrous sulfate  Repeat lab work in a m  Dementia [facility])  Assessment & Plan  Continue Aricept    Aortic stenosis  Assessment & Plan  Moderate to severe aortic valve stenosis noted    Carotid disease, bilateral [facility])  Assessment & Plan  Status post bilateral carotid artery stent placement  Continue Lipitor  plavix has been restarted    Type 2 diabetes mellitus, without long-term current use of insulin [facility])  Assessment & Plan  Lab Results   Component Value Date    HGBA1C 5 9 (H) 09/10/2020       Recent Labs     09/12/20  2132 09/13/20  0723 09/13/20  1140 09/13/20  1613   POCGLU 164* 115 191* 122     Holding metformin  patient on Accu-Cheks with sliding scale insulin      Dyslipidemia  Assessment & Plan  Lipitor was increased to 80 mg daily but now on 40 mg daily    Essential hypertension  Assessment & Plan  Initially antihypertensives held to allow for permissive hypertension and then due soft blood pressures  Blood pressures have improved  Restart Diovan if continues to remain stable        VTE Pharmacologic Prophylaxis:   Pharmacologic: Pharmacologic VTE Prophylaxis contraindicated due to GI bleed  Mechanical VTE Prophylaxis in Place: Yes    Patient Centered Rounds: I have performed bedside rounds with nursing staff today  Discussions with Specialists or Other Care Team Provider: yes     Education and Discussions with Family / Patient: yes - Breanna Stagekeri    Time Spent for Care: 35 min  More than 50% of total time spent on counseling and coordination of care as described above      Current Length of Stay: 3 day(s)    Current Patient Status: Inpatient   Certification Statement: The patient will need continued hospital stay due to GI bleed, anemia requiring monitoring of lab work    Discharge Plan: pending    Code Status: Level 3 - DNAR and DNI      Subjective:   As per RN, patient had a smear of dark colored stool    Objective:     Vitals:   Temp (24hrs), Av 2 °F (36 8 °C), Min:97 7 °F (36 5 °C), Max:98 6 °F (37 °C)    Temp:  [97 7 °F (36 5 °C)-98 6 °F (37 °C)] 98 1 °F (36 7 °C)  HR:  [81-94] 91  Resp:  [18-27] 18  BP: (108-148)/(54-65) 132/63  SpO2:  [95 %-99 %] 96 %  Body mass index is 23 17 kg/m²  Input and Output Summary (last 24 hours): Intake/Output Summary (Last 24 hours) at 2020 0946  Last data filed at 2020 2207  Gross per 24 hour   Intake 390 ml   Output 1100 ml   Net -710 ml       Physical Exam:     Physical Exam  Constitutional:       General: He is not in acute distress  Appearance: He is well-developed  He is not diaphoretic  HENT:      Head: Normocephalic and atraumatic  Ears:      Comments: Extremely hard of hearing  Eyes:      General: No scleral icterus  Right eye: No discharge  Left eye: No discharge  Cardiovascular:      Rate and Rhythm: Normal rate and regular rhythm  Pulmonary:      Effort: Pulmonary effort is normal  No respiratory distress  Breath sounds: No wheezing or rales  Comments: Decreased breath sounds bilaterally  Abdominal:      General: Bowel sounds are normal  There is no distension  Palpations: Abdomen is soft  Tenderness: There is no abdominal tenderness  Neurological:      Mental Status: He is alert        Comments: Oriented to self and place, poor historian         Additional Data:     Labs:    Results from last 7 days   Lab Units 20  0556  20  0534   WBC Thousand/uL 9 41  --  11 44*   HEMOGLOBIN g/dL 7 5*   < > 7 3*   HEMATOCRIT % 23 5*   < > 22 2*   PLATELETS Thousands/uL 164  --  142*   NEUTROS PCT %  --   --  77*   LYMPHS PCT %  --   --  13*   MONOS PCT %  --   --  8   EOS PCT %  --   --  1    < > = values in this interval not displayed  Results from last 7 days   Lab Units 09/13/20  0556   POTASSIUM mmol/L 3 8   CHLORIDE mmol/L 110*   CO2 mmol/L 27   BUN mg/dL 9   CREATININE mg/dL 1 13   CALCIUM mg/dL 7 5*     Results from last 7 days   Lab Units 09/09/20  0756   INR  1 13       * I Have Reviewed All Lab Data Listed Above  * Additional Pertinent Lab Tests Reviewed: [address] 66 Admission Reviewed      Imaging:  Imaging Reports Reviewed Today Include:  EGD/colonoscopy  Imaging Personally Reviewed by Myself Includes:  N/A    Recent Cultures (last 7 days):           Last 24 Hours Medication List:   Current Facility-Administered Medications   Medication Dose Route Frequency Provider Last Rate    atorvastatin  40 mg Oral Daily With ANGELIKA Arnold      cholecalciferol  2,000 Units Oral Early Morning ANGELIKA Watters      clopidogrel  75 mg Oral Daily ANGELIKA Rizzo      donepezil  10 mg Oral HS ANGELIKA Watters      ferrous sulfate  325 mg Oral TID With Meals ANGELIKA Rizzo      folic acid  626 mcg Oral Early Morning ANGELIKA Watters      insulin lispro  1-5 Units Subcutaneous TID AC ANGELIKA Rizzo      insulin lispro  1-5 Units Subcutaneous HS ANGELIKA Watters      ondansetron  4 mg Intravenous Q6H PRN ANGELIKA Watters      pantoprazole  40 mg Intravenous Q12H [facility] ANGELIKA Rizzo      senna  1 tablet Oral BID ANGELIKA Watters      tamsulosin  0 4 mg Oral Daily With ANGELIKA Arnold            Today, Patient Was Seen By: Malcolm Garcia DO    ** Please Note: "This note has been constructed using a voice recognition system  Therefore there may be syntax, spelling, and/or grammatical errors   Please call if you have any questions  "**

## 2020-09-13 NOTE — OCCUPATIONAL THERAPY NOTE
OT TREATMENT     09/13/20 1105   OT Last Visit   OT Visit Date 09/13/20   Restrictions/Precautions   Other Precautions Hard of hearing; Fall Risk;Bed Alarm; Chair Alarm   Pain Assessment   Pain Assessment Tool Pain Assessment not indicated - pt denies pain   ADL   Where Assessed Edge of bed   Eating Assistance 5  Supervision/Setup   Grooming Assistance 5  Supervision/Setup   UB Dressing Assistance 5  Supervision/Setup   LB Dressing Assistance 4  Minimal Assistance   Toileting Assistance  4  Minimal Assistance   Toileting Deficit Perineal hygiene;Grab bar use; Increased time to complete;Supervison/safety;Verbal cueing   Functional Standing Tolerance   Time 3 min   Activity handwashing and grooming at sink   Comments supervision and set up   Bed Mobility   Rolling R 5  Supervision   Supine to Sit 4  Minimal assistance   Sit to Supine 5  Supervision   Transfers   Sit to Stand 4  Minimal assistance   Stand to Sit 4  Minimal assistance   Toilet transfer 4  Minimal assistance   Additional items Standard toilet   Functional Mobility   Functional Mobility 4  Minimal assistance   Additional Comments to and from bathroom   Additional items Rolling walker   Toilet Transfers   Toilet Transfer From Rolling walker   Toilet Transfer Type To and from   Toilet Transfer to Standard toilet   Toilet Transfer Technique Ambulating   Toilet Transfers Minimal assistance   Therapeutic Exercise - ROM   UE-ROM Yes   ROM- Right Upper Extremities   R Shoulder AROM; Flexion;ABduction;Horizontal ABduction; Extension; External rotation   R Elbow AROM;Elbow flexion;Elbow extension   R Wrist AROM; Wrist flexion;Wrist extension   R Hand AROM; Thumb; Index finger; Long finger;Ring finger;Little finger   R Position Seated   R Weight/Reps/Sets 5 reps   ROM - Left Upper Extremities    L Shoulder AROM; Flexion;ABduction; Extension;Horizontal ABduction; External rotation   L Elbow AROM;Elbow flexion;Elbow extension   L Wrist AROM; Wrist flexion;Wrist extension   L September 20, 2019     Patient: Tabitha Olvera   YOB: 1968   Date of Visit: 9/20/2019       To Whom it May Concern:    Tabitha Olvera was seen in my clinic on 9/20/2019 at 10:30 am.    He should be on the following work restrictions until 9/30/2019-no lifting greater than 10 pounds.    Sincerely,         Med Garcia MD    Medical information is confidential and cannot be disclosed without the written consent of the patient or his representative.       Hand AROM; Thumb; Index finger; Long finger;Ring finger;Little finger   L Position Seated   L Weight/Reps/Sets 5 reps   Cognition   Arousal/Participation Alert; Responsive  ([place])   Attention Within functional limits   Orientation Level Oriented to person;Oriented to place   Following Commands Follows one step commands with increased time or repetition  (very [place])   Activity Tolerance   Activity Tolerance Patient tolerated treatment well   Medical Staff Made Aware Nursing: Naif Heads   Assessment   Assessment Pt seen at bedside for skilled OT services  Pt completed bed mobility, dressing, functional mobility with rolling walker, toileting and functional standing with supervision to MIN assist  Pt completed B UE ROM therex at the edge of bed with visual and verbal cues  Pt very [place], requires cues to complete tasks  Pt returned to bed with call bell, bed alarm, and all needs within reach; nursing aware  Pt will continue to benefit from skilled tx, will follow, recommend D/C to Home with skilled OT services  Plan   Treatment Interventions ADL retraining;Functional transfer training;UE strengthening/ROM; Endurance training;Cognitive reorientation;Patient/family training;Equipment evaluation/education; Activityengagement; Energy conservation; Compensatory technique education   Goal Expiration Date 09/24/20   OT Frequency 5x/wk   Recommendation   OT Discharge Recommendation Home with skilled therapy  [facility] OT)   Licensure   [address] [place] [address] License Number  [address]  [id number]

## 2020-09-13 NOTE — SOCIAL WORK
LOS - 3 days    SW following to assist with DCP  Message received from Dr Kathy Mcdonald and nurse, Amelie Mae, that daughter is considering STR placement and requested referral be made to [facility]  Referral has been made  SW will follow up with family on Monday  SW will continue to follow to monitor progress and assist with planning as needed

## 2020-09-13 NOTE — PLAN OF CARE
Problem: Potential for Falls  Goal: Patient will remain free of falls  Description: INTERVENTIONS:  - Assess patient frequently for physical needs  -  Identify cognitive and physical deficits and behaviors that affect risk of falls  -  [place] fall precautions as indicated by assessment   - Educate patient/family on patient safety including physical limitations  - Instruct patient to call for assistance with activity based on assessment  - Modify environment to reduce risk of injury  - Consider OT/PT consult to assist with strengthening/mobility  Outcome: Progressing     Problem: Prexisting or High Potential for Compromised Skin Integrity  Goal: Skin integrity is maintained or improved  Description: INTERVENTIONS:  - Identify patients at risk for skin breakdown  - Assess and monitor skin integrity  - Assess and monitor nutrition and hydration status  - Monitor labs   - Assess for incontinence   - Turn and reposition patient  - Assist with mobility/ambulation  - Relieve pressure over bony prominences  - Avoid friction and shearing  - Provide appropriate hygiene as needed including keeping skin clean and dry  - Evaluate need for skin moisturizer/barrier cream  - Collaborate with interdisciplinary team   - Patient/family teaching  - Consider wound care consult   Outcome: Progressing     Problem: Neurological Deficit  Goal: Neurological status is stable or improving  Description: Interventions:  - Monitor and assess patient's level of consciousness, motor function, sensory function, and level of assistance needed for ADLs  - Monitor and report changes from baseline  Collaborate with interdisciplinary team to initiate plan and implement interventions as ordered  - Provide and maintain a safe environment  - Consider seizure precautions  - Consider fall precautions  - Consider aspiration precautions  - Consider bleeding precautions  Outcome: Progressing     Problem:  Activity Intolerance/Impaired Mobility  Goal: Mobility/activity is maintained at optimum level for patient  Description: Interventions:  - Assess and monitor patient  barriers to mobility and need for assistive/adaptive devices  - Assess patient's emotional response to limitations  - Collaborate with interdisciplinary team and initiate plans and interventions as ordered  - Encourage independent activity per ability   - Maintain proper body alignment  - Perform active/passive rom as tolerated/ordered  - Plan activities to conserve energy   - Turn patient as appropriate  Outcome: Progressing     Problem: Communication Impairment  Goal: Ability to express needs and understand communication  Description: Assess patient's communication skills and ability to understand information  Patient will demonstrate use of effective communication techniques, alternative methods of communication and understanding even if not able to speak  - Encourage communication and provide alternate methods of communication as needed  - Collaborate with case management/ for discharge needs  - Include patient/family/caregiver in decisions related to communication  Outcome: Progressing     Problem: Nutrition  Goal: Nutrition/Hydration status is improving  Description: Monitor and assess patient's nutrition/hydration status for malnutrition (ex- brittle hair, bruises, dry skin, pale skin and conjunctiva, muscle wasting, smooth red tongue, and disorientation)  Collaborate with interdisciplinary team and initiate plan and interventions as ordered  Monitor patient's weight and dietary intake as ordered or per policy  Utilize nutrition screening tool and intervene per policy  Determine patient's food preferences and provide high-protein, high-caloric foods as appropriate  - Assist patient with eating   - Allow adequate time for meals   - Encourage patient to take dietary supplement as ordered    - Collaborate with clinical nutritionist   - Include patient/family/caregiver in decisions related to nutrition    Outcome: Progressing

## 2020-09-13 NOTE — ASSESSMENT & PLAN NOTE
Patient with history of iron deficiency anemia  He gets IV iron infusions once per month by his hematologist  Hemoglobin trended down to 7 8 with his episodes of GI bleed  As per ICU note, he received a total of 3 units of PRBC transfusion during this admission  Patient's hemoglobin again dropped to 6 7 and patient is ordered for 1 unit of PRBC transfusion  Results from last 7 days   Lab Units 09/15/20  0522 09/14/20  2026 09/14/20  1047 09/14/20  0535 09/13/20  0556 09/13/20  0054 09/12/20  1756 09/12/20  1155 09/12/20  0534 09/11/20  1753 09/11/20  0901 09/11/20  0540  09/10/20  0536 09/09/20  0756   HEMOGLOBIN g/dL 6 7* 7 3* 7 9* 7 7* 7 5* 7 4* 7 8* 7 4* 7 3* 7 9* 8 1* 7 8*   < > 8 5* 8 7*   HEMATOCRIT % 21 2* 23 4* 24 5* 24 1* 23 5* 22 8* 24 0* 22 4* 22 2*  --  27 3* 23 8*   < > 27 2* 27 9*   MCV fL  --   --   --   --  96  --   --   --  93  --   --  94  --  98 99*    < > = values in this interval not displayed

## 2020-09-14 LAB
ABO GROUP BLD BPU: NORMAL
ABO GROUP BLD BPU: NORMAL
ANION GAP SERPL CALCULATED.3IONS-SCNC: 8 MMOL/L (ref 4–13)
BPU ID: NORMAL
BPU ID: NORMAL
BUN SERPL-MCNC: 9 MG/DL (ref 5–25)
CALCIUM SERPL-MCNC: 7.3 MG/DL (ref 8.3–10.1)
CHLORIDE SERPL-SCNC: 109 MMOL/L (ref 100–108)
CO2 SERPL-SCNC: 26 MMOL/L (ref 21–32)
CREAT SERPL-MCNC: 1.24 MG/DL (ref 0.6–1.3)
CROSSMATCH: NORMAL
CROSSMATCH: NORMAL
GFR SERPL CREATININE-BSD FRML MDRD: 51 ML/MIN/1.73SQ M
GLUCOSE SERPL-MCNC: 106 MG/DL (ref 65–140)
GLUCOSE SERPL-MCNC: 120 MG/DL (ref 65–140)
GLUCOSE SERPL-MCNC: 137 MG/DL (ref 65–140)
GLUCOSE SERPL-MCNC: 161 MG/DL (ref 65–140)
GLUCOSE SERPL-MCNC: 233 MG/DL (ref 65–140)
HCT VFR BLD AUTO: 23.4 % (ref 36.5–49.3)
HCT VFR BLD AUTO: 24.1 % (ref 36.5–49.3)
HCT VFR BLD AUTO: 24.5 % (ref 36.5–49.3)
HGB BLD-MCNC: 7.3 G/DL (ref 12–17)
HGB BLD-MCNC: 7.7 G/DL (ref 12–17)
HGB BLD-MCNC: 7.9 G/DL (ref 12–17)
POTASSIUM SERPL-SCNC: 3.8 MMOL/L (ref 3.5–5.3)
SODIUM SERPL-SCNC: 143 MMOL/L (ref 136–145)
UNIT DISPENSE STATUS: NORMAL
UNIT DISPENSE STATUS: NORMAL
UNIT PRODUCT CODE: NORMAL
UNIT PRODUCT CODE: NORMAL
UNIT RH: NORMAL
UNIT RH: NORMAL

## 2020-09-14 PROCEDURE — 85014 HEMATOCRIT: CPT | Performed by: FAMILY MEDICINE

## 2020-09-14 PROCEDURE — 97110 THERAPEUTIC EXERCISES: CPT

## 2020-09-14 PROCEDURE — 85018 HEMOGLOBIN: CPT | Performed by: FAMILY MEDICINE

## 2020-09-14 PROCEDURE — C9113 INJ PANTOPRAZOLE SODIUM, VIA: HCPCS | Performed by: NURSE PRACTITIONER

## 2020-09-14 PROCEDURE — 85018 HEMOGLOBIN: CPT | Performed by: NURSE PRACTITIONER

## 2020-09-14 PROCEDURE — 82948 REAGENT STRIP/BLOOD GLUCOSE: CPT

## 2020-09-14 PROCEDURE — 99232 SBSQ HOSP IP/OBS MODERATE 35: CPT | Performed by: INTERNAL MEDICINE

## 2020-09-14 PROCEDURE — 85018 HEMOGLOBIN: CPT | Performed by: PHYSICIAN ASSISTANT

## 2020-09-14 PROCEDURE — 99232 SBSQ HOSP IP/OBS MODERATE 35: CPT | Performed by: FAMILY MEDICINE

## 2020-09-14 PROCEDURE — 85014 HEMATOCRIT: CPT | Performed by: PHYSICIAN ASSISTANT

## 2020-09-14 PROCEDURE — 85014 HEMATOCRIT: CPT | Performed by: NURSE PRACTITIONER

## 2020-09-14 PROCEDURE — 80048 BASIC METABOLIC PNL TOTAL CA: CPT | Performed by: FAMILY MEDICINE

## 2020-09-14 PROCEDURE — 92507 TX SP LANG VOICE COMM INDIV: CPT

## 2020-09-14 PROCEDURE — 97116 GAIT TRAINING THERAPY: CPT

## 2020-09-14 RX ORDER — LOSARTAN POTASSIUM 25 MG/1
25 TABLET ORAL DAILY
Status: DISCONTINUED | OUTPATIENT
Start: 2020-09-15 | End: 2020-09-18 | Stop reason: HOSPADM

## 2020-09-14 RX ORDER — LOSARTAN POTASSIUM 25 MG/1
25 TABLET ORAL DAILY
Status: DISCONTINUED | OUTPATIENT
Start: 2020-09-14 | End: 2020-09-14

## 2020-09-14 RX ADMIN — VITAMIN D, TAB 1000IU (100/BT) 2000 UNITS: 25 TAB at 05:29

## 2020-09-14 RX ADMIN — PANTOPRAZOLE SODIUM 40 MG: 40 INJECTION, POWDER, FOR SOLUTION INTRAVENOUS at 21:18

## 2020-09-14 RX ADMIN — PANTOPRAZOLE SODIUM 40 MG: 40 INJECTION, POWDER, FOR SOLUTION INTRAVENOUS at 08:58

## 2020-09-14 RX ADMIN — FERROUS SULFATE TAB 325 MG (65 MG ELEMENTAL FE) 325 MG: 325 (65 FE) TAB at 16:07

## 2020-09-14 RX ADMIN — INSULIN LISPRO 2 UNITS: 100 INJECTION, SOLUTION INTRAVENOUS; SUBCUTANEOUS at 11:50

## 2020-09-14 RX ADMIN — FERROUS SULFATE TAB 325 MG (65 MG ELEMENTAL FE) 325 MG: 325 (65 FE) TAB at 12:01

## 2020-09-14 RX ADMIN — ATORVASTATIN CALCIUM 40 MG: 40 TABLET, FILM COATED ORAL at 16:07

## 2020-09-14 RX ADMIN — STANDARDIZED SENNA CONCENTRATE 8.6 MG: 8.6 TABLET ORAL at 08:58

## 2020-09-14 RX ADMIN — DONEPEZIL HYDROCHLORIDE 10 MG: 5 TABLET ORAL at 21:17

## 2020-09-14 RX ADMIN — FERROUS SULFATE TAB 325 MG (65 MG ELEMENTAL FE) 325 MG: 325 (65 FE) TAB at 08:58

## 2020-09-14 RX ADMIN — GLYCERIN, HYPROMELLOSE, POLYETHYLENE GLYCOL 1 DROP: .2; .2; 1 LIQUID OPHTHALMIC at 21:24

## 2020-09-14 RX ADMIN — CLOPIDOGREL BISULFATE 75 MG: 75 TABLET ORAL at 08:54

## 2020-09-14 RX ADMIN — TAMSULOSIN HYDROCHLORIDE 0.4 MG: 0.4 CAPSULE ORAL at 16:07

## 2020-09-14 RX ADMIN — Medication 400 MCG: at 05:29

## 2020-09-14 RX ADMIN — STANDARDIZED SENNA CONCENTRATE 8.6 MG: 8.6 TABLET ORAL at 17:53

## 2020-09-14 RX ADMIN — INSULIN LISPRO 1 UNITS: 100 INJECTION, SOLUTION INTRAVENOUS; SUBCUTANEOUS at 21:21

## 2020-09-14 NOTE — PLAN OF CARE
Problem: Potential for Falls  Goal: Patient will remain free of falls  Description: INTERVENTIONS:  - Assess patient frequently for physical needs  -  Identify cognitive and physical deficits and behaviors that affect risk of falls  -  [place] fall precautions as indicated by assessment   - Educate patient/family on patient safety including physical limitations  - Instruct patient to call for assistance with activity based on assessment  - Modify environment to reduce risk of injury  - Consider OT/PT consult to assist with strengthening/mobility  Outcome: Progressing     Problem: Prexisting or High Potential for Compromised Skin Integrity  Goal: Skin integrity is maintained or improved  Description: INTERVENTIONS:  - Identify patients at risk for skin breakdown  - Assess and monitor skin integrity  - Assess and monitor nutrition and hydration status  - Monitor labs   - Assess for incontinence   - Turn and reposition patient  - Assist with mobility/ambulation  - Relieve pressure over bony prominences  - Avoid friction and shearing  - Provide appropriate hygiene as needed including keeping skin clean and dry  - Evaluate need for skin moisturizer/barrier cream  - Collaborate with interdisciplinary team   - Patient/family teaching  - Consider wound care consult   Outcome: Progressing     Problem: Neurological Deficit  Goal: Neurological status is stable or improving  Description: Interventions:  - Monitor and assess patient's level of consciousness, motor function, sensory function, and level of assistance needed for ADLs  - Monitor and report changes from baseline  Collaborate with interdisciplinary team to initiate plan and implement interventions as ordered  - Provide and maintain a safe environment  - Consider seizure precautions  - Consider fall precautions  - Consider aspiration precautions  - Consider bleeding precautions  Outcome: Progressing     Problem:  Activity Intolerance/Impaired Mobility  Goal: Mobility/activity is maintained at optimum level for patient  Description: Interventions:  - Assess and monitor patient  barriers to mobility and need for assistive/adaptive devices  - Assess patient's emotional response to limitations  - Collaborate with interdisciplinary team and initiate plans and interventions as ordered  - Encourage independent activity per ability   - Maintain proper body alignment  - Perform active/passive rom as tolerated/ordered  - Plan activities to conserve energy   - Turn patient as appropriate  Outcome: Progressing     Problem: Communication Impairment  Goal: Ability to express needs and understand communication  Description: Assess patient's communication skills and ability to understand information  Patient will demonstrate use of effective communication techniques, alternative methods of communication and understanding even if not able to speak  - Encourage communication and provide alternate methods of communication as needed  - Collaborate with case management/ for discharge needs  - Include patient/family/caregiver in decisions related to communication  Outcome: Progressing     Problem: Nutrition  Goal: Nutrition/Hydration status is improving  Description: Monitor and assess patient's nutrition/hydration status for malnutrition (ex- brittle hair, bruises, dry skin, pale skin and conjunctiva, muscle wasting, smooth red tongue, and disorientation)  Collaborate with interdisciplinary team and initiate plan and interventions as ordered  Monitor patient's weight and dietary intake as ordered or per policy  Utilize nutrition screening tool and intervene per policy  Determine patient's food preferences and provide high-protein, high-caloric foods as appropriate  - Assist patient with eating   - Allow adequate time for meals   - Encourage patient to take dietary supplement as ordered    - Collaborate with clinical nutritionist   - Include patient/family/caregiver in decisions related to nutrition    Outcome: Progressing

## 2020-09-14 NOTE — PLAN OF CARE
Problem: PHYSICAL THERAPY ADULT  Goal: Performs mobility at highest level of function for planned discharge setting  See evaluation for individualized goals  Outcome: Progressing  Note: Prognosis: Good  Problem List: Impaired balance, Decreased mobility  Assessment: Pt continues to do well with transfers, bed mob, gait with RW, balance and endurance  Pt will cont to benefit from skilled PT services to return pt to prior independent level of function  PT Discharge Recommendation: Home with skilled therapy          See flowsheet documentation for full assessment

## 2020-09-14 NOTE — SOCIAL WORK
LOS - 4 days    SW following to assist with DCP  Received message yesterday that pt's daughter-in-law was considering STR placement for pt upon discharge  SW spoke with daughter-in-law today to discuss plans and review facility choices  She did confirm that STR placement would be her preference for discharge and [facility]  is facility preference  Pt has been accepted by [facility] and there is a bed available for pt  Daughter-in-law will be considering plans for after rehab including private duty caregiver at home, assisted living or long term care  She expressed that she is concerned about his progressing dementia  Offered ongoing support and assistance  SW will continue to follow to monitor progress and assist with transfer when ready

## 2020-09-14 NOTE — PROGRESS NOTES
Progress Note - [facility] 80 y o  male MRN: [id number]    Unit/Bed#: 2 South 202 Encounter: [id number]        Subjective:   Patient reports the patient had 1 black tarry stool this morning, no melanotic bowel movements reported overnight, no vomiting or hematemesis  Patient denies any abdominal pain or nausea at this time    Objective:     Vitals: Blood pressure 117/57, pulse 77, temperature 98 °F (36 7 °C), temperature source Oral, resp  rate 18, height 6' (1 829 m), weight 78 3 kg (172 lb 9 9 oz), SpO2 97 %  ,Body mass index is 23 41 kg/m²  Intake/Output Summary (Last 24 hours) at 9/14/2020 1227  Last data filed at 9/14/2020 0750  Gross per 24 hour   Intake 685 ml   Output 1200 ml   Net -515 ml       Physical Exam:   General appearance: alert, appears stated age and cooperative  Lungs: clear to auscultation bilaterally, no labored breathing/accessory muscle use  Heart: regular rate and rhythm, S1, S2 normal, no murmur, click, rub or gallop  Abdomen: soft, non-tender; bowel sounds normal; no masses,  no organomegaly  Extremities: no edema    Invasive Devices     Peripheral Intravenous Line            Peripheral IV 09/12/20 Left;Upper Arm 1 day                Lab, Imaging and other studies: I have personally reviewed pertinent reports  No results displayed because visit has over 200 results  Results for Cullen Gibson (MRN [id number]) as of 9/14/2020 12:27   Ref   Range 9/12/2020 11:16 9/12/2020 11:55 9/12/2020 15:58 9/12/2020 17:56 9/12/2020 21:32 9/13/2020 00:54 9/13/2020 05:56 9/13/2020 07:23 9/13/2020 11:40 9/13/2020 16:13 9/13/2020 21:18 9/14/2020 05:35 9/14/2020 07:05 9/14/2020 07:27 9/14/2020 07:27 9/14/2020 10:47 9/14/2020 11:49   POC Glucose Latest Ref Range: 65 - 140 mg/dl 193 (H)  137  164 (H)   115 191 (H) 122 140  137    233 (H)   Sodium Latest Ref Range: 136 - 145 mmol/L       143     143        Potassium Latest Ref Range: 3 5 - 5 3 mmol/L       3 8     3 8        Chloride Latest Ref Range: 100 - 108 mmol/L       110 (H)     109 (H)        CO2 Latest Ref Range: 21 - 32 mmol/L       27     26        Anion Gap Latest Ref Range: 4 - 13 mmol/L       6     8        BUN Latest Ref Range: 5 - 25 mg/dL       9     9        Creatinine Latest Ref Range: 0 60 - 1 30 mg/dL       1 13     1 24        Glucose, Random Latest Ref Range: 65 - 140 mg/dL       115     120        Calcium Latest Ref Range: 8 3 - 10 1 mg/dL       7 5 (L)     7 3 (L)        eGFR Latest Units: ml/min/1 73sq m       57     51        Magnesium Latest Ref Range: 1 6 - 2 6 mg/dL       1 8             WBC Latest Ref Range: 4 31 - 10 16 Thousand/uL       9 41             Red Blood Cell Count Latest Ref Range: 3 88 - 5 62 Million/uL       2 45 (L)             Hemoglobin Latest Ref Range: 12 0 - 17 0 g/dL  7 4 (L)  7 8 (L)  7 4 (L) 7 5 (L)     7 7 (L)    7 9 (L)    HCT Latest Ref Range: 36 5 - 49 3 %  22 4 (L)  24 0 (L)  22 8 (L) 23 5 (L)     24 1 (L)    24 5 (L)    MCV Latest Ref Range: 82 - 98 fL       96             MCH Latest Ref Range: 26 8 - 34 3 pg       30 6             MCHC Latest Ref Range: 31 4 - 37 4 g/dL       31 9             RDW Latest Ref Range: 11 6 - 15 1 %       16 3 (H)             Platelet Count Latest Ref Range: 149 - 390 Thousands/uL       164             MPV Latest Ref Range: 8 9 - 12 7 fL       10 6             Crossmatch Unknown              Compatible Compatible     Unit ABO Unknown              A A     Unit RH Unknown              POS POS     Unit Number Unknown              [id number]     Unit Dispense Status Unknown              Presumed Trans Return to Inv         Assessment/Plan:    1  Hematochezia in the setting of antiplatelet therapy instituted for TIA, patient underwent EGD and colonoscopy on 09/11 with findings of active bleeding from diverticulum in the descending colon which was treated; 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

## 2020-09-14 NOTE — SPEECH THERAPY NOTE
Speech Language/Pathology    Speech/Language Pathology Progress Note    Patient Name: Akanksha Nicholson  JNGBM'G Date: 9/14/2020     Problem List  Principal Problem:    Rectal bleeding  Active Problems:    Left-sided weakness    Essential hypertension    Dyslipidemia    Type 2 diabetes mellitus, without long-term current use of insulin (HCC)    Carotid disease, bilateral ([address] )    Acute blood loss anemia    Aortic stenosis    Dementia (HCC)      Subjective:  Pt  Was alert and sitting OOB in a recliner  He was awake however he did not have his hearing aides in     Objective:  Pt  Was seen for follow up to speech and language therapy  He continues to have a impaired short term memory  He was unable to recall the events that have recently happened at the hospital and it was noted that he was on a clear liquid diet but he could not explain why  Stated that he "wasn't hungry" and swears he ordered soup but only got broth  When pt  Was cued to recall information, he still was not able to recall it  He is able to recall how to press his call light for nursing  Assessment:  Continues to have cognitive impairments  According to notes, patient may be going to rehab following his stay here  Suggest continues with cog assessment/treatment at that level of care      Plan/Recommendations:  Speech f/u as inpatient    Briscoe Osgood, MS CCC-SLP  Speech Language Pathologist  Available via [address] [facility] License # [id number]  [facility] [address] # [id number]

## 2020-09-14 NOTE — PROGRESS NOTES
[facility] Internal Medicine Progress Note  Patient: Kenneth Alvarez 80 y o  male   MRN: [id number]  PCP: Pipo Rosen DO  Unit/Bed#: [address] [place] [address] Encounter: [id number]  Date Of Visit: 09/14/20    Problem List:    Principal Problem:    Rectal bleeding  Active Problems:    Left-sided weakness    Acute blood loss anemia    Essential hypertension    Dyslipidemia    Type 2 diabetes mellitus, without long-term current use of insulin (HCC)    Carotid disease, bilateral ([facility] [address] )    Aortic stenosis    Dementia ([facility] [address] )      Assessment & Plan:    Left-sided weakness  Assessment & Plan  Patient presented with left-sided weakness along with left facial droop, status post fall at home   CT of head revealed chronic changes   MRI negative for acute stroke  Symptoms likely TIA   Discontinued full-dose ASA as per Neurology  Plavix was held previously due to GI bleed as benefits outweighed the risk of stroke but has been restarted  Continue statin   He was on neuro checks    Lipid panel - LDL 26, HgA1C 5 9   CTA head/neck showed 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   Echo with bubble study showed EF of 55-60% with grade 1 diastolic dysfunction  No ASD or PFO noted   Neuro eval appreciated  * Rectal bleeding  Assessment & Plan  Patient had multiple episodes of rectal bleeding on 09/10  Hemoglobin was initially stable  Rapid response was later called due to lethargy and hypotension and patient transferred to the ICU  Status post EGD and colonoscopy  Colonoscopy showed multiple diverticula in the left colon and few in the right colon  One small diverticulum in the descending colon was actively bleeding  Given epi and hemoclips placed  Tattoo was placed  EGD showed 1 cm hiatal hernia, open Schatzki's ring  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 which was cauterized with APC  Patient had 1 episode of black tarry stool this morning which could be residual bleeding  Plavix was initially held due to this but okay to restart again as per GI  Monitor for further recurrence  Continue on IV Protonix b i d  for now    Acute blood loss anemia  Assessment & Plan  Patient with history of iron deficiency anemia  He gets IV iron infusions once per month by his hematologist  Hemoglobin trended down to 7 8 with his episodes of GI bleed  As per ICU note, he received a total of 3 units of PRBC transfusion during this admission  Hemoglobin today 7 7 --> 7 9 on recheck  continue ferrous sulfate  Repeat lab work in a m  Dementia [facility])  Assessment & Plan  Continue Aricept  Aortic stenosis  Assessment & Plan  Moderate to severe aortic valve stenosis noted  Carotid disease, bilateral [facility])  Assessment & Plan  Status post bilateral carotid artery stent placement  Continue Lipitor  plavix has been restarted  Type 2 diabetes mellitus, without long-term current use of insulin [facility])  Assessment & Plan  Lab Results   Component Value Date    HGBA1C 5 9 (H) 09/10/2020       Recent Labs     09/13/20  1613 09/13/20  2118 09/14/20  0705 09/14/20  1149   POCGLU 122 140 137 233*     Holding metformin  patient on Accu-Cheks with sliding scale insulin  Dyslipidemia  Assessment & Plan  Lipitor was increased to 80 mg daily but now on 40 mg daily  Essential hypertension  Assessment & Plan  Initially antihypertensives held to allow for permissive hypertension and then due soft blood pressures  Blood pressures have improved  Diovan substituted with Cozaar while here and started at a lower dose         VTE Pharmacologic Prophylaxis:   Pharmacologic: Pharmacologic VTE Prophylaxis contraindicated due to GI bleed  Mechanical VTE Prophylaxis in Place: Yes    Patient Centered Rounds: I have performed bedside rounds with nursing staff today      Discussions with Specialists or Other Care Team Provider: yes - GI    Education and Discussions with Family / Patient: yes - Salud Molina    Time Spent for Care: 35 min  More than 50% of total time spent on counseling and coordination of care as described above  Current Length of Stay: 4 day(s)    Current Patient Status: Inpatient   Certification Statement: The patient will need continued hospital stay due to GI bleed, anemia requiring monitoring of lab work    Discharge Plan: pending    Code Status: Level 3 - DNAR and DNI      Subjective:   As per RN patient had a bowel movement which was black tarry    Objective:     Vitals:   Temp (24hrs), Av 1 °F (36 7 °C), Min:98 °F (36 7 °C), Max:98 1 °F (36 7 °C)    Temp:  [98 °F (36 7 °C)-98 1 °F (36 7 °C)] 98 °F (36 7 °C)  HR:  [] 77  Resp:  [18-19] 18  BP: (104-137)/(56-65) 117/57  SpO2:  [93 %-97 %] 97 %  Body mass index is 23 41 kg/m²  Input and Output Summary (last 24 hours): Intake/Output Summary (Last 24 hours) at 2020 0914  Last data filed at 2020 0750  Gross per 24 hour   Intake 925 ml   Output 1400 ml   Net -475 ml       Physical Exam:     Physical Exam  Constitutional:       General: He is not in acute distress  Appearance: He is well-developed  He is not diaphoretic  HENT:      Head: Normocephalic and atraumatic  Ears:      Comments: Extremely hard of hearing  Eyes:      General: No scleral icterus  Right eye: No discharge  Left eye: No discharge  Cardiovascular:      Rate and Rhythm: Normal rate and regular rhythm  Pulmonary:      Effort: Pulmonary effort is normal  No respiratory distress  Breath sounds: Normal breath sounds  No wheezing or rales  Abdominal:      General: Bowel sounds are normal  There is no distension  Palpations: Abdomen is soft  Tenderness: There is no abdominal tenderness  Neurological:      Mental Status: He is alert  Comments: Left facial droop noted    Oriented to self, place and knew the year         Additional Data:     Labs:    Results from last 7 days   Lab Units 20  0535 20  0556 09/12/20  0534   WBC Thousand/uL  --  9 41  --  11 44*   HEMOGLOBIN g/dL 7 7* 7 5*   < > 7 3*   HEMATOCRIT % 24 1* 23 5*   < > 22 2*   PLATELETS Thousands/uL  --  164  --  142*   NEUTROS PCT %  --   --   --  77*   LYMPHS PCT %  --   --   --  13*   MONOS PCT %  --   --   --  8   EOS PCT %  --   --   --  1    < > = values in this interval not displayed  Results from last 7 days   Lab Units 09/14/20  0535   POTASSIUM mmol/L 3 8   CHLORIDE mmol/L 109*   CO2 mmol/L 26   BUN mg/dL 9   CREATININE mg/dL 1 24   CALCIUM mg/dL 7 3*     Results from last 7 days   Lab Units 09/09/20  0756   INR  1 13       * I Have Reviewed All Lab Data Listed Above  * Additional Pertinent Lab Tests Reviewed:  Matthew 66 Admission Reviewed      Imaging:  Imaging Reports Reviewed Today Include:  EGD/colonoscopy  Imaging Personally Reviewed by Myself Includes:  N/A    Recent Cultures (last 7 days):           Last 24 Hours Medication List:   Current Facility-Administered Medications   Medication Dose Route Frequency Provider Last Rate    atorvastatin  40 mg Oral Daily With ANGELIKA Macias      cholecalciferol  2,000 Units Oral Early Morning ANGELIKA Marti      clopidogrel  75 mg Oral Daily ANGELIKA Simpson      donepezil  10 mg Oral HS ANGELIKA Marti      ferrous sulfate  325 mg Oral TID With Meals ANGELIKA Simpson      folic acid  261 mcg Oral Early Morning ANGELIKA Marti      insulin lispro  1-5 Units Subcutaneous TID AC ANGELIKA Simpson      insulin lispro  1-5 Units Subcutaneous HS ANGELIKA Marti      ondansetron  4 mg Intravenous Q6H PRN ANGELIKA Marti      pantoprazole  40 mg Intravenous Q12H [address] ANGELIKA Simpson      senna  1 tablet Oral BID ANGELIKA Marti      tamsulosin  0 4 mg Oral Daily With ANGELIKA Macias            Today, Patient Was Seen By: Ilda French DO    ** Please Note: "This note has been constructed using a voice recognition system  Therefore there may be syntax, spelling, and/or grammatical errors   Please call if you have any questions  "**

## 2020-09-14 NOTE — QUICK NOTE
Notified patient had one bowel movement this evening with dark red blood  Will order stat Hgb  Hold AM plavix until case can be discussed with GI again, NPO for now

## 2020-09-14 NOTE — PHYSICAL THERAPY NOTE
PT TREATMENT     09/14/20 1345   PT Last Visit   PT Visit Date 09/14/20   Pain Assessment   Pain Assessment Tool Pain Assessment not indicated - pt denies pain   Restrictions/Precautions   Other Precautions Fall Risk;Bed Alarm; Chair Alarm   General   Chart Reviewed Yes   Family/Caregiver Present No   Subjective   Subjective "I just got into bed, I was out in the chair all morning"   Bed Mobility   Supine to Sit 5  Supervision   Sit to Supine 5  Supervision   Transfers   Sit to Stand 5  Supervision   Additional items Verbal cues   Stand to Sit 5  Supervision   Additional items Verbal cues   Ambulation/Elevation   Gait Assistance 5  Supervision   Additional items Verbal cues; Tactile cues   Assistive Device Rolling walker   Distance 80 feet x 3 with change in direction all with rest inbetween ambulation   Balance   Static Sitting Good   Static Standing Fair +   Dynamic Standing Fair   Ambulatory Fair   Activity Tolerance   Activity Tolerance Patient tolerated treatment well;Patient limited by fatigue   Assessment   Assessment Pt continues to do well with transfers, bed mob, gait with RW, balance and endurance  Pt will cont to benefit from skilled PT services to return pt to prior independent level of function  Plan   Treatment/Interventions ADL retraining;Functional transfer training;LE strengthening/ROM; Therapeutic exercise; Endurance training;Patient/family training;Equipment eval/education; Bed mobility;Gait training; Compensatory technique education   PT Frequency 5x/wk   Recommendation   PT Discharge Recommendation Home with skilled therapy   Licensure   [place] License Number  [id number] [place] [address] [id number]

## 2020-09-15 ENCOUNTER — ANESTHESIA (INPATIENT)
Dept: PERIOP | Facility: HOSPITAL | Age: 85
DRG: 378 | End: 2020-09-15
Payer: MEDICARE

## 2020-09-15 ENCOUNTER — ANESTHESIA EVENT (INPATIENT)
Dept: PERIOP | Facility: HOSPITAL | Age: 85
DRG: 378 | End: 2020-09-15
Payer: MEDICARE

## 2020-09-15 ENCOUNTER — APPOINTMENT (INPATIENT)
Dept: PERIOP | Facility: HOSPITAL | Age: 85
DRG: 378 | End: 2020-09-15
Payer: MEDICARE

## 2020-09-15 VITALS — HEART RATE: 69 BPM

## 2020-09-15 LAB
ABO GROUP BLD: NORMAL
ANION GAP SERPL CALCULATED.3IONS-SCNC: 8 MMOL/L (ref 4–13)
BLD GP AB SCN SERPL QL: NEGATIVE
BUN SERPL-MCNC: 10 MG/DL (ref 5–25)
CALCIUM SERPL-MCNC: 7.3 MG/DL (ref 8.3–10.1)
CHLORIDE SERPL-SCNC: 107 MMOL/L (ref 100–108)
CO2 SERPL-SCNC: 26 MMOL/L (ref 21–32)
CREAT SERPL-MCNC: 1.13 MG/DL (ref 0.6–1.3)
GFR SERPL CREATININE-BSD FRML MDRD: 57 ML/MIN/1.73SQ M
GLUCOSE SERPL-MCNC: 133 MG/DL (ref 65–140)
GLUCOSE SERPL-MCNC: 140 MG/DL (ref 65–140)
GLUCOSE SERPL-MCNC: 140 MG/DL (ref 65–140)
GLUCOSE SERPL-MCNC: 176 MG/DL (ref 65–140)
GLUCOSE SERPL-MCNC: 258 MG/DL (ref 65–140)
HCT VFR BLD AUTO: 21.2 % (ref 36.5–49.3)
HGB BLD-MCNC: 6.7 G/DL (ref 12–17)
POTASSIUM SERPL-SCNC: 3.6 MMOL/L (ref 3.5–5.3)
RH BLD: POSITIVE
SODIUM SERPL-SCNC: 141 MMOL/L (ref 136–145)
SPECIMEN EXPIRATION DATE: NORMAL

## 2020-09-15 PROCEDURE — 82948 REAGENT STRIP/BLOOD GLUCOSE: CPT

## 2020-09-15 PROCEDURE — 45378 DIAGNOSTIC COLONOSCOPY: CPT | Performed by: INTERNAL MEDICINE

## 2020-09-15 PROCEDURE — 86850 RBC ANTIBODY SCREEN: CPT | Performed by: NURSE PRACTITIONER

## 2020-09-15 PROCEDURE — 85014 HEMATOCRIT: CPT | Performed by: FAMILY MEDICINE

## 2020-09-15 PROCEDURE — 0DJD8ZZ INSPECTION OF LOWER INTESTINAL TRACT, VIA NATURAL OR ARTIFICIAL OPENING ENDOSCOPIC: ICD-10-PCS | Performed by: INTERNAL MEDICINE

## 2020-09-15 PROCEDURE — C9113 INJ PANTOPRAZOLE SODIUM, VIA: HCPCS | Performed by: INTERNAL MEDICINE

## 2020-09-15 PROCEDURE — 86900 BLOOD TYPING SEROLOGIC ABO: CPT | Performed by: NURSE PRACTITIONER

## 2020-09-15 PROCEDURE — 86923 COMPATIBILITY TEST ELECTRIC: CPT

## 2020-09-15 PROCEDURE — 86901 BLOOD TYPING SEROLOGIC RH(D): CPT | Performed by: NURSE PRACTITIONER

## 2020-09-15 PROCEDURE — 99232 SBSQ HOSP IP/OBS MODERATE 35: CPT | Performed by: INTERNAL MEDICINE

## 2020-09-15 PROCEDURE — 99233 SBSQ HOSP IP/OBS HIGH 50: CPT | Performed by: INTERNAL MEDICINE

## 2020-09-15 PROCEDURE — C9113 INJ PANTOPRAZOLE SODIUM, VIA: HCPCS | Performed by: NURSE PRACTITIONER

## 2020-09-15 PROCEDURE — 43235 EGD DIAGNOSTIC BRUSH WASH: CPT | Performed by: INTERNAL MEDICINE

## 2020-09-15 PROCEDURE — NC001 PR NO CHARGE: Performed by: INTERNAL MEDICINE

## 2020-09-15 PROCEDURE — 85018 HEMOGLOBIN: CPT | Performed by: FAMILY MEDICINE

## 2020-09-15 PROCEDURE — P9016 RBC LEUKOCYTES REDUCED: HCPCS

## 2020-09-15 PROCEDURE — 80048 BASIC METABOLIC PNL TOTAL CA: CPT | Performed by: FAMILY MEDICINE

## 2020-09-15 RX ORDER — PROPOFOL 10 MG/ML
INJECTION, EMULSION INTRAVENOUS AS NEEDED
Status: DISCONTINUED | OUTPATIENT
Start: 2020-09-15 | End: 2020-09-15

## 2020-09-15 RX ORDER — METOCLOPRAMIDE HYDROCHLORIDE 5 MG/ML
10 INJECTION INTRAMUSCULAR; INTRAVENOUS ONCE
Status: COMPLETED | OUTPATIENT
Start: 2020-09-15 | End: 2020-09-15

## 2020-09-15 RX ORDER — LIDOCAINE HYDROCHLORIDE 10 MG/ML
INJECTION, SOLUTION EPIDURAL; INFILTRATION; INTRACAUDAL; PERINEURAL AS NEEDED
Status: DISCONTINUED | OUTPATIENT
Start: 2020-09-15 | End: 2020-09-15

## 2020-09-15 RX ORDER — SODIUM CHLORIDE, SODIUM LACTATE, POTASSIUM CHLORIDE, CALCIUM CHLORIDE 600; 310; 30; 20 MG/100ML; MG/100ML; MG/100ML; MG/100ML
INJECTION, SOLUTION INTRAVENOUS CONTINUOUS PRN
Status: DISCONTINUED | OUTPATIENT
Start: 2020-09-15 | End: 2020-09-15

## 2020-09-15 RX ADMIN — DONEPEZIL HYDROCHLORIDE 10 MG: 5 TABLET ORAL at 21:46

## 2020-09-15 RX ADMIN — STANDARDIZED SENNA CONCENTRATE 8.6 MG: 8.6 TABLET ORAL at 09:54

## 2020-09-15 RX ADMIN — FERROUS SULFATE TAB 325 MG (65 MG ELEMENTAL FE) 325 MG: 325 (65 FE) TAB at 12:20

## 2020-09-15 RX ADMIN — LIDOCAINE HYDROCHLORIDE 50 MG: 10 INJECTION, SOLUTION EPIDURAL; INFILTRATION; INTRACAUDAL; PERINEURAL at 16:01

## 2020-09-15 RX ADMIN — PROPOFOL 50 MG: 10 INJECTION, EMULSION INTRAVENOUS at 16:10

## 2020-09-15 RX ADMIN — VITAMIN D, TAB 1000IU (100/BT) 2000 UNITS: 25 TAB at 05:14

## 2020-09-15 RX ADMIN — PANTOPRAZOLE SODIUM 40 MG: 40 INJECTION, POWDER, FOR SOLUTION INTRAVENOUS at 21:46

## 2020-09-15 RX ADMIN — ATORVASTATIN CALCIUM 40 MG: 40 TABLET, FILM COATED ORAL at 18:32

## 2020-09-15 RX ADMIN — PROPOFOL 50 MG: 10 INJECTION, EMULSION INTRAVENOUS at 16:01

## 2020-09-15 RX ADMIN — INSULIN LISPRO 1 UNITS: 100 INJECTION, SOLUTION INTRAVENOUS; SUBCUTANEOUS at 12:20

## 2020-09-15 RX ADMIN — SODIUM CHLORIDE, SODIUM LACTATE, POTASSIUM CHLORIDE, AND CALCIUM CHLORIDE: .6; .31; .03; .02 INJECTION, SOLUTION INTRAVENOUS at 15:56

## 2020-09-15 RX ADMIN — POLYETHYLENE GLYCOL 3350, SODIUM SULFATE ANHYDROUS, SODIUM BICARBONATE, SODIUM CHLORIDE, POTASSIUM CHLORIDE 3000 ML: 236; 22.74; 6.74; 5.86; 2.97 POWDER, FOR SOLUTION ORAL at 11:18

## 2020-09-15 RX ADMIN — FERROUS SULFATE TAB 325 MG (65 MG ELEMENTAL FE) 325 MG: 325 (65 FE) TAB at 18:32

## 2020-09-15 RX ADMIN — METOCLOPRAMIDE 10 MG: 5 INJECTION, SOLUTION INTRAMUSCULAR; INTRAVENOUS at 12:20

## 2020-09-15 RX ADMIN — Medication 400 MCG: at 05:14

## 2020-09-15 RX ADMIN — PANTOPRAZOLE SODIUM 40 MG: 40 INJECTION, POWDER, FOR SOLUTION INTRAVENOUS at 09:54

## 2020-09-15 RX ADMIN — LOSARTAN POTASSIUM 25 MG: 25 TABLET, FILM COATED ORAL at 09:54

## 2020-09-15 RX ADMIN — PROPOFOL 50 MG: 10 INJECTION, EMULSION INTRAVENOUS at 16:14

## 2020-09-15 RX ADMIN — FERROUS SULFATE TAB 325 MG (65 MG ELEMENTAL FE) 325 MG: 325 (65 FE) TAB at 09:54

## 2020-09-15 RX ADMIN — PROPOFOL 50 MG: 10 INJECTION, EMULSION INTRAVENOUS at 16:06

## 2020-09-15 RX ADMIN — INSULIN LISPRO 2 UNITS: 100 INJECTION, SOLUTION INTRAVENOUS; SUBCUTANEOUS at 21:46

## 2020-09-15 RX ADMIN — TAMSULOSIN HYDROCHLORIDE 0.4 MG: 0.4 CAPSULE ORAL at 18:32

## 2020-09-15 NOTE — PROGRESS NOTES
Progress Note - Conner Ayers 80 y o  male MRN: [id number]    Unit/Bed#: [address] Encounter: [id number]        Subjective:   Nursing reports patient had least 2 bowel movements since last evening characterized as dark red and bloody  Patient is extremely hard of hearing but ultimately denies any abdominal pain or nausea, he has not had any vomiting episodes or hematemesis, no bowel movements within the last few hours    Objective:     Vitals: Blood pressure 112/96, pulse 77, temperature (!) 97 °F (36 1 °C), temperature source Oral, resp  rate 18, height 6' (1 829 m), weight 78 3 kg (172 lb 9 9 oz), SpO2 97 %  ,Body mass index is 23 41 kg/m²  Intake/Output Summary (Last 24 hours) at 9/15/2020 1049  Last data filed at 9/15/2020 0600  Gross per 24 hour   Intake    Output 450 ml   Net -450 ml       Physical Exam:   General appearance: alert, appears stated age and cooperative  Lungs: clear to auscultation bilaterally, no labored breathing/accessory muscle use  Heart: regular rate and rhythm, S1, S2 normal, no murmur, click, rub or gallop  Abdomen: soft, non-tender; bowel sounds normal; no masses,  no organomegaly  Extremities: no edema    Invasive Devices     Peripheral Intravenous Line            Peripheral IV 09/12/20 Left;Upper Arm 2 days                Lab, Imaging and other studies: I have personally reviewed pertinent reports  No results displayed because visit has over 200 results  Results for Merna Jung (MRN [id number]) as of 9/15/2020 10:49   Ref   Range 9/13/2020 07:23 9/13/2020 11:40 9/13/2020 16:13 9/13/2020 21:18 9/14/2020 05:35 9/14/2020 07:05 9/14/2020 07:27 9/14/2020 07:27 9/14/2020 10:47 9/14/2020 11:49 9/14/2020 16:04 9/14/2020 20:26 9/14/2020 21:06 9/15/2020 05:22 9/15/2020 07:09 9/15/2020 07:54 9/15/2020 09:30   POC Glucose Latest Ref Range: 65 - 140 mg/dl 115 191 (H) 122 140  137    233 (H) 106  161 (H)  140     Sodium Latest Ref Range: 136 - 145 mmol/L     143 141      Potassium Latest Ref Range: 3 5 - 5 3 mmol/L     3 8         3 6      Chloride Latest Ref Range: 100 - 108 mmol/L     109 (H)         107      CO2 Latest Ref Range: 21 - 32 mmol/L     26         26      Anion Gap Latest Ref Range: 4 - 13 mmol/L     8         8      BUN Latest Ref Range: 5 - 25 mg/dL     9         10      Creatinine Latest Ref Range: 0 60 - 1 30 mg/dL     1 24         1 13      Glucose, Random Latest Ref Range: 65 - 140 mg/dL     120         133      Calcium Latest Ref Range: 8 3 - 10 1 mg/dL     7 3 (L)         7 3 (L)      eGFR Latest Units: ml/min/1 73sq m     51         57      Hemoglobin Latest Ref Range: 12 0 - 17 0 g/dL     7 7 (L)    7 9 (L)   7 3 (L)  6 7 (LL)      HCT Latest Ref Range: 36 5 - 49 3 %     24 1 (L)    24 5 (L)   23 4 (L)  21 2 (L)      ABO Grouping Unknown                A    Rh Factor Unknown                Positive    Antibody Screen Unknown                Negative    Specimen Expiration Date Unknown                [id number]    Crossmatch Unknown       Compatible Compatible         Compatible   Unit ABO Unknown       A A         A   Unit RH Unknown       POS POS         POS   Unit Number Unknown       [id number]         [id number]   Unit Dispense Status Unknown       Presumed Trans Return to Inv         Issued           Assessment/Plan:    1  Lower GI bleeding, appears to be recurrent diverticular bleeding, patient underwent EGD and colonoscopy a few days ago with cauterization of large gastric AVM and with endoscopic hemostasis of actively bleeding diverticulum in the descending colon, which was marked with ink marker  Patient noted this morning with some bloody stools reported overnight, and drop in hemoglobin  This is in the setting of antiplatelet therapy with Plavix for TIA    - agree with PRBC transfusions  - NPO  - administer bowel prep this morning, as much as possible until 12:30 p m   And then NPO thereafter  - plan for urgent colonoscopy for further evaluation, and consider EGD if no obvious bleeding sources elicited during that exam    - if colonoscopy is negative for any active bleeding and patient has further episodes of rectal bleeding thereafter, he may require IR consultation for angiogram and possible embolization

## 2020-09-15 NOTE — ANESTHESIA POSTPROCEDURE EVALUATION
Post-Op Assessment Note    CV Status:  Stable    Pain management: adequate     Mental Status:  Alert, awake and confused   Hydration Status:  Euvolemic   PONV Controlled:  Controlled   Airway Patency:  Patent      Post Op Vitals Reviewed: Yes      Staff: Anesthesiologist         No complications documented      BP   96/50   Temp      Pulse  69   Resp   12   SpO2   96%

## 2020-09-15 NOTE — ANESTHESIA PREPROCEDURE EVALUATION
Procedure:  COLONOSCOPY  EGD    Relevant Problems   CARDIO   (+) Aortic stenosis   (+) Essential hypertension      ENDO   (+) Type 2 diabetes mellitus, without long-term current use of insulin (HCC)      GI/HEPATIC   (+) Rectal bleeding      HEMATOLOGY   (+) Acute blood loss anemia      MUSCULOSKELETAL   (+) Primary osteoarthritis of right hip      NEURO/PSYCH   (+) Dementia (HCC)        Physical Exam    Airway    Mallampati score: II  TM Distance: >3 FB  Neck ROM: full     Dental   upper dentures and lower dentures,     Cardiovascular      Pulmonary      Other Findings        9/10/20 TTE:  LEFT VENTRICLE:  Systolic function was normal  Ejection fraction was estimated in the range of 55 % to 60 % to be 60 %  There were no regional wall motion abnormalities  Wall thickness was mildly increased  There was mild concentric hypertrophy  Doppler parameters were consistent with abnormal left ventricular relaxation (grade 1 diastolic dysfunction)      LEFT ATRIUM:  The atrium was mildly dilated      ATRIAL SEPTUM:  No AS defect or large patent foramen ovale was identified by bubble study      RIGHT ATRIUM:  The atrium was mildly dilated      MITRAL VALVE:  There was moderate annular calcification  There was mild regurgitation      AORTIC VALVE:  The valve was probably trileaflet  Leaflets exhibited moderately to markedly increased thickness, moderate calcification, and markedly reduced cuspal separation  Transaortic velocity was increased due to valvular stenosis  There was moderate to severe stenosis  Peak gradient 52, mean 28 mm of hg  CARLA around 1 cm2 by continity, Apeears severe by 2D  There was trace regurgitation      TRICUSPID VALVE:  There was mild to moderate regurgitation  Estimated peak PA pressure was 40 mmHg      PULMONIC VALVE:  There was trace regurgitation      IVC, HEPATIC VEINS:  The respirophasic change in diameter was more than 50%      Anesthesia Plan  ASA Score- 4 Emergent    Anesthesia Type- IV sedation with anesthesia with ASA Monitors  Additional Monitors:   Airway Plan:     Comment: I discussed the risks and benefits of IV sedation anesthesia including the possibility of the need to convert to general anesthesia and the potential risk of awareness  Plan Factors-    Chart reviewed  Existing labs reviewed  Patient summary reviewed  Patient is not a current smoker  Induction- intravenous  Postoperative Plan-     Informed Consent- Plan/risks discussed with: daughter-in-law via telephone

## 2020-09-15 NOTE — PROGRESS NOTES
Progress Note - Britney Escobar 12/3/1930, 80 y o  male MRN: [id number]    Unit/Bed#: [address] [place] [address] Encounter: [id number]    Primary Care Provider: Nilay Rodriguez DO   Date and time admitted to hospital: 9/9/2020  7:28 AM        * Rectal bleeding  Assessment & Plan  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 which was cauterized with APC  Patient had 1 episode of dark stool last night and 1 frankly bloody bowel movement this morning with hemoglobin dropped  Continue IV Protonix  Discussed with GI-patient to be scheduled for repeat colonoscopy today rule out slipped clip versus a new source of bleeding  If colonoscopy is negative patient might need IR guided embolization    Acute blood loss anemia  Assessment & Plan  Patient with history of iron deficiency anemia    He gets IV iron infusions once per month by his hematologist  Hemoglobin trended down to 7 8 with his episodes of GI bleed  As per ICU note, he received a total of 3 units of PRBC transfusion during this admission  Patient's hemoglobin again dropped to 6 7 and patient is ordered for 1 unit of PRBC transfusion  Results from last 7 days   Lab Units 09/15/20  0522 09/14/20  2026 09/14/20  1047 09/14/20  0535 09/13/20  0556 09/13/20  0054 09/12/20  1756 09/12/20  1155 09/12/20  0534 09/11/20  1753 09/11/20  0901 09/11/20  0540  09/10/20  0536 09/09/20  0756   HEMOGLOBIN g/dL 6 7* 7 3* 7 9* 7 7* 7 5* 7 4* 7 8* 7 4* 7 3* 7 9* 8 1* 7 8*   < > 8 5* 8 7*   HEMATOCRIT % 21 2* 23 4* 24 5* 24 1* 23 5* 22 8* 24 0* 22 4* 22 2*  --  27 3* 23 8*   < > 27 2* 27 9*   MCV fL  --   --   --   --  96  --   --   --  93  --   --  94  --  98 99*    < > = values in this interval not displayed  Left-sided weakness  Assessment & Plan  Patient presented with left-sided weakness along with left facial droop, status post fall at home   CT of head revealed chronic changes   MRI negative for acute stroke  Symptoms likely TIA   Discontinued full-dose ASA as per Neurology  Patient has been on Plavix which has been held again due to persistent GI bleed and worsening hemoglobin  Continue statin   He was on neuro checks    Lipid panel - LDL 26, HgA1C 5 9   CTA head/neck showed 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   Echo with bubble study showed EF of 55-60% with grade 1 diastolic dysfunction  No ASD or PFO noted   Neuro eval appreciated  Type 2 diabetes mellitus, without long-term current use of insulin [facility])  Assessment & Plan  Lab Results   Component Value Date    HGBA1C 5 9 (H) 09/10/2020       Recent Labs     09/13/20  1613 09/13/20  2118 09/14/20  0705 09/14/20  1149   POCGLU 122 140 137 233*     Holding metformin  patient on Accu-Cheks with sliding scale insulin  Dementia [facility])  Assessment & Plan  Continue Aricept  Aortic stenosis  Assessment & Plan  Moderate to severe aortic valve stenosis noted  Carotid disease, bilateral [facility])  Assessment & Plan  Status post bilateral carotid artery stent placement  Continue Lipitor  Patient has been on Plavix which is again held due to GI bleeding  Dyslipidemia  Assessment & Plan  Lipitor was increased to 80 mg daily but now on 40 mg daily  Essential hypertension  Assessment & Plan  Initially antihypertensives held to allow for permissive hypertension and then due soft blood pressures  Blood pressures have improved    Diovan substituted with Cozaar while here and started at a lower dose     VTE Pharmacologic Prophylaxis:   Pharmacologic: Pharmacologic VTE Prophylaxis contraindicated due to GI bleeding  Mechanical VTE Prophylaxis in Place: Yes    Patient Centered Rounds: I have performed bedside rounds with nursing staff today  Discussions with Specialists or Other Care Team Provider: Dr Manas Lazo    Education and Discussions with Family / Patient:  Daughter-in-law Zuleika Sutton    Time Spent for Care: 45 minutes  More than 50% of total time spent on counseling and coordination of care as described above  Current Length of Stay: 5 day(s)    Current Patient Status: Inpatient   Certification Statement: The patient will continue to require additional inpatient hospital stay due to Rectal bleeding and anemia    Discharge Plan:  Short-term rehabilitation    Code Status: Level 3 - DNAR and DNI      Subjective:   Patient had 1 episode of dark bowel movement yesterday evening and 1 episode of sapna bloody bowel movement overnight  Patient denies any abdominal pain  Reported mild nausea  Objective:     Vitals:   Temp (24hrs), Av 7 °F (36 5 °C), Min:97 °F (36 1 °C), Max:98 °F (36 7 °C)    Temp:  [97 °F (36 1 °C)-98 °F (36 7 °C)] 98 °F (36 7 °C)  HR:  [77-96] 77  Resp:  [17-20] 18  BP: (104-140)/(44-96) 104/51  SpO2:  [94 %-100 %] 94 %  Body mass index is 23 41 kg/m²  Input and Output Summary (last 24 hours): Intake/Output Summary (Last 24 hours) at 9/15/2020 1341  Last data filed at 9/15/2020 1015  Gross per 24 hour   Intake 46 67 ml   Output 550 ml   Net -503 33 ml       Physical Exam:     Physical Exam  Constitutional:       General: He is not in acute distress  HENT:      Head: Normocephalic and atraumatic  Eyes:      Conjunctiva/sclera: Conjunctivae normal       Pupils: Pupils are equal, round, and reactive to light  Neck:      Musculoskeletal: Normal range of motion and neck supple  Cardiovascular:      Rate and Rhythm: Normal rate and regular rhythm  Heart sounds: Murmur present  Pulmonary:      Effort: Pulmonary effort is normal  No respiratory distress  Breath sounds: No wheezing, rhonchi or rales  Chest:      Chest wall: No tenderness  Abdominal:      General: Bowel sounds are normal  There is no distension  Palpations: Abdomen is soft  Tenderness: There is no abdominal tenderness  There is no guarding or rebound  Skin:     General: Skin is warm and dry  Findings: No rash  Neurological:      Mental Status: He is alert  Cranial Nerves: No cranial nerve deficit  Comments: Patient is extremely hard of hearing         Additional Data:     Labs:    Results from last 7 days   Lab Units 09/15/20  0522  09/13/20  0556  09/12/20  0534   WBC Thousand/uL  --   --  9 41  --  11 44*   HEMOGLOBIN g/dL 6 7*   < > 7 5*   < > 7 3*   HEMATOCRIT % 21 2*   < > 23 5*   < > 22 2*   PLATELETS Thousands/uL  --   --  164  --  142*   NEUTROS PCT %  --   --   --   --  77*   LYMPHS PCT %  --   --   --   --  13*   MONOS PCT %  --   --   --   --  8   EOS PCT %  --   --   --   --  1    < > = values in this interval not displayed  Results from last 7 days   Lab Units 09/15/20  0522   POTASSIUM mmol/L 3 6   CHLORIDE mmol/L 107   CO2 mmol/L 26   BUN mg/dL 10   CREATININE mg/dL 1 13   CALCIUM mg/dL 7 3*     Results from last 7 days   Lab Units 09/09/20  0756   INR  1 13       * I Have Reviewed All Lab Data Listed Above  * Additional Pertinent Lab Tests Reviewed:  [facility]        Recent Cultures (last 7 days):           Last 24 Hours Medication List:   Current Facility-Administered Medications   Medication Dose Route Frequency Provider Last Rate    atorvastatin  40 mg Oral Daily With Dinner ANGELIKA Up      cholecalciferol  2,000 Units Oral Early Morning ANGELIKA Up      donepezil  10 mg Oral HS ANGELIKA Up      ferrous sulfate  325 mg Oral TID With Meals Merari Oar Deep, ANGELIKA      folic acid  519 mcg Oral Early Morning Reena Kira, ANGELIKA      glycerin-hypromellose-  1 drop Both Eyes Q6H PRN ANGELIKA Morillo      insulin lispro  1-5 Units Subcutaneous TID AC Daniella Adame, CRNP      insulin lispro  1-5 Units Subcutaneous HS [place], ANGELIKA      losartan  25 mg Oral Daily Gin Revankar, DO      ondansetron  4 mg Intravenous Q6H PRN [place] ANGELIKA Malone      pantoprazole  40 mg Intravenous Q12H [address] Daniella Adame, ANGELIKA      senna  1 tablet Oral BID Reena Kira, ANGELIKA      tamsulosin  0 4 mg Oral Daily With [address] ANGELIKA Guillen          Today, Patient Was Seen By: Byron Zamorano MD    ** Please Note: Dictation voice to text software may have been used in the creation of this document   **

## 2020-09-16 LAB
ABO GROUP BLD BPU: NORMAL
BPU ID: NORMAL
CROSSMATCH: NORMAL
ERYTHROCYTE [DISTWIDTH] IN BLOOD BY AUTOMATED COUNT: 16.4 % (ref 11.6–15.1)
GLUCOSE SERPL-MCNC: 103 MG/DL (ref 65–140)
GLUCOSE SERPL-MCNC: 124 MG/DL (ref 65–140)
GLUCOSE SERPL-MCNC: 153 MG/DL (ref 65–140)
GLUCOSE SERPL-MCNC: 224 MG/DL (ref 65–140)
HCT VFR BLD AUTO: 22.6 % (ref 36.5–49.3)
HCT VFR BLD AUTO: 25.7 % (ref 36.5–49.3)
HGB BLD-MCNC: 7.1 G/DL (ref 12–17)
HGB BLD-MCNC: 8.4 G/DL (ref 12–17)
MCH RBC QN AUTO: 30.3 PG (ref 26.8–34.3)
MCHC RBC AUTO-ENTMCNC: 31.4 G/DL (ref 31.4–37.4)
MCV RBC AUTO: 97 FL (ref 82–98)
PLATELET # BLD AUTO: 192 THOUSANDS/UL (ref 149–390)
PMV BLD AUTO: 10 FL (ref 8.9–12.7)
RBC # BLD AUTO: 2.34 MILLION/UL (ref 3.88–5.62)
UNIT DISPENSE STATUS: NORMAL
UNIT PRODUCT CODE: NORMAL
UNIT RH: NORMAL
WBC # BLD AUTO: 9.74 THOUSAND/UL (ref 4.31–10.16)

## 2020-09-16 PROCEDURE — 85027 COMPLETE CBC AUTOMATED: CPT | Performed by: INTERNAL MEDICINE

## 2020-09-16 PROCEDURE — 82948 REAGENT STRIP/BLOOD GLUCOSE: CPT

## 2020-09-16 PROCEDURE — 99232 SBSQ HOSP IP/OBS MODERATE 35: CPT | Performed by: INTERNAL MEDICINE

## 2020-09-16 PROCEDURE — 85018 HEMOGLOBIN: CPT | Performed by: INTERNAL MEDICINE

## 2020-09-16 PROCEDURE — 92507 TX SP LANG VOICE COMM INDIV: CPT

## 2020-09-16 PROCEDURE — 85014 HEMATOCRIT: CPT | Performed by: INTERNAL MEDICINE

## 2020-09-16 PROCEDURE — P9016 RBC LEUKOCYTES REDUCED: HCPCS

## 2020-09-16 PROCEDURE — 97535 SELF CARE MNGMENT TRAINING: CPT

## 2020-09-16 PROCEDURE — C9113 INJ PANTOPRAZOLE SODIUM, VIA: HCPCS | Performed by: INTERNAL MEDICINE

## 2020-09-16 PROCEDURE — 97530 THERAPEUTIC ACTIVITIES: CPT

## 2020-09-16 PROCEDURE — 30233N1 TRANSFUSION OF NONAUTOLOGOUS RED BLOOD CELLS INTO PERIPHERAL VEIN, PERCUTANEOUS APPROACH: ICD-10-PCS | Performed by: INTERNAL MEDICINE

## 2020-09-16 RX ADMIN — Medication 400 MCG: at 05:37

## 2020-09-16 RX ADMIN — DONEPEZIL HYDROCHLORIDE 10 MG: 5 TABLET ORAL at 21:25

## 2020-09-16 RX ADMIN — PANTOPRAZOLE SODIUM 40 MG: 40 INJECTION, POWDER, FOR SOLUTION INTRAVENOUS at 21:26

## 2020-09-16 RX ADMIN — STANDARDIZED SENNA CONCENTRATE 8.6 MG: 8.6 TABLET ORAL at 18:47

## 2020-09-16 RX ADMIN — ATORVASTATIN CALCIUM 40 MG: 40 TABLET, FILM COATED ORAL at 16:54

## 2020-09-16 RX ADMIN — LOSARTAN POTASSIUM 25 MG: 25 TABLET, FILM COATED ORAL at 08:35

## 2020-09-16 RX ADMIN — FERROUS SULFATE TAB 325 MG (65 MG ELEMENTAL FE) 325 MG: 325 (65 FE) TAB at 11:09

## 2020-09-16 RX ADMIN — VITAMIN D, TAB 1000IU (100/BT) 2000 UNITS: 25 TAB at 05:37

## 2020-09-16 RX ADMIN — FERROUS SULFATE TAB 325 MG (65 MG ELEMENTAL FE) 325 MG: 325 (65 FE) TAB at 16:54

## 2020-09-16 RX ADMIN — TAMSULOSIN HYDROCHLORIDE 0.4 MG: 0.4 CAPSULE ORAL at 16:54

## 2020-09-16 RX ADMIN — INSULIN LISPRO 1 UNITS: 100 INJECTION, SOLUTION INTRAVENOUS; SUBCUTANEOUS at 16:54

## 2020-09-16 RX ADMIN — INSULIN LISPRO 1 UNITS: 100 INJECTION, SOLUTION INTRAVENOUS; SUBCUTANEOUS at 11:09

## 2020-09-16 RX ADMIN — FERROUS SULFATE TAB 325 MG (65 MG ELEMENTAL FE) 325 MG: 325 (65 FE) TAB at 08:35

## 2020-09-16 RX ADMIN — PANTOPRAZOLE SODIUM 40 MG: 40 INJECTION, POWDER, FOR SOLUTION INTRAVENOUS at 08:35

## 2020-09-16 RX ADMIN — STANDARDIZED SENNA CONCENTRATE 8.6 MG: 8.6 TABLET ORAL at 08:35

## 2020-09-16 NOTE — PLAN OF CARE
Problem: Potential for Falls  Goal: Patient will remain free of falls  Description: INTERVENTIONS:  - Assess patient frequently for physical needs  -  Identify cognitive and physical deficits and behaviors that affect risk of falls  -  [place] fall precautions as indicated by assessment   - Educate patient/family on patient safety including physical limitations  - Instruct patient to call for assistance with activity based on assessment  - Modify environment to reduce risk of injury  - Consider OT/PT consult to assist with strengthening/mobility  Outcome: Progressing     Problem: Neurological Deficit  Goal: Neurological status is stable or improving  Description: Interventions:  - Monitor and assess patient's level of consciousness, motor function, sensory function, and level of assistance needed for ADLs  - Monitor and report changes from baseline  Collaborate with interdisciplinary team to initiate plan and implement interventions as ordered  - Provide and maintain a safe environment  - Consider seizure precautions  - Consider fall precautions  - Consider aspiration precautions  - Consider bleeding precautions  Outcome: Progressing     Problem: Activity Intolerance/Impaired Mobility  Goal: Mobility/activity is maintained at optimum level for patient  Description: Interventions:  - Assess and monitor patient  barriers to mobility and need for assistive/adaptive devices  - Assess patient's emotional response to limitations  - Collaborate with interdisciplinary team and initiate plans and interventions as ordered  - Encourage independent activity per ability   - Maintain proper body alignment  - Perform active/passive rom as tolerated/ordered  - Plan activities to conserve energy      Outcome: Progressing     Problem: GASTROINTESTINAL - ADULT  Goal: Maintains or returns to baseline bowel function  Description: INTERVENTIONS:  - Assess bowel function  - Encourage oral fluids to ensure adequate hydration  - Encourage mobilization and activity  - Consider nutritional services referral to assist patient with adequate nutrition and appropriate food choices  Outcome: Progressing     Problem: HEMATOLOGIC - ADULT  Goal: Maintains hematologic stability  Description: INTERVENTIONS  - Assess for signs and symptoms of bleeding or hemorrhage  - Monitor labs  - Administer supportive blood products/factors as ordered and appropriate  Outcome: Progressing

## 2020-09-16 NOTE — OCCUPATIONAL THERAPY NOTE
OT TREATMENT     09/16/20 1425   Restrictions/Precautions   Other Precautions Fall Risk;Hard of hearing   Pain Assessment   Pain Assessment Tool Pain Assessment not indicated - pt denies pain   ADL   Grooming Assistance 5  Supervision/Setup   Grooming Deficit Wash/dry hands; Wash/dry face;Brushing hair;Standing with assistive device   Grooming Comments Pt ambulated to bathroom with RW with supervision and stood at sink to complete grooming tasks  Toileting Assistance  5  Supervision/Setup   Toileting Deficit Grab bar use; Increased time to complete   Bed Mobility   Supine to Sit 5  Supervision   Sit to Supine 5  Supervision   Transfers   Sit to Stand 5  Supervision   Stand to Sit 5  Supervision   Toilet transfer 5  Supervision   Additional items Standard toilet  (Pt ambulated to and from toilet with RW with supervision )   Functional Mobility   Functional Mobility 5  Supervision   Additional Comments Pt walked down spear and back (short community distance) with RW with supervision  Additional items Rolling walker   Cognition   Overall Cognitive Status WFL   Arousal/Participation Alert; Cooperative   Attention Within functional limits   Orientation Level Oriented X4   Following Commands Follows multistep commands with increased time or repetition   Comments Direction following limited bc pt very [place]  When pt can hear, direction following is [facility]  Activity Tolerance   Activity Tolerance Patient tolerated treatment well   Assessment   Assessment Pt seen for OT treatment  Pt alert and cooperative  Pt very [place] and is unsure if batteries in his hearing aids are working,  Nursing staff  Karlo Ocampo) made aware and will contact family about getting replacement batteries  Pt demonstrated good body mechanics and strength when completing bed mobility with supervision  Pt demonstrated good endurance when ambulating down the spear and back from room with the RW with supervision   Pt demonstrated proper bathroom hygiene when urinating in bathroom  Pt demonstrated proper grooming/hygiene skills standing at bathroom sink with RW  Pt pleasant to work with and left in room with all needs met adn call bell within reach  Plan   Treatment Interventions UE strengthening/ROM; Endurance training;Patient/family training;Energy conservation; Activityengagement;ADL retraining;Functional transfer training   Goal Expiration Date 09/24/20   OT Frequency 3-5x/wk   Recommendation   OT Discharge Recommendation Post-Acute Rehabilitation Services   Additional Comments  daughter in law requesting pt go to [facility]     Licensure   [place] License Number  [id number]

## 2020-09-16 NOTE — PROGRESS NOTES
Progress Note - Liz Kang 12/3/1930, 80 y o  male MRN: [id number]    Unit/Bed#: [address] [place] [address] Encounter: [id number]    Primary Care Provider: Shayne Thurston DO   Date and time admitted to hospital: 9/9/2020  7:28 AM        * Rectal bleeding  Assessment & Plan  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 which was cauterized with APC  Patient continues to have intermittent bloody bowel movements  Patient had 1 bowel movement last night which was bloody  Continue IV Protonix  Patient underwent repeat EGD and colonoscopy on September 15, 2020 which showed no active bleeding with only some dark liquid stool throughout the colon  If patient continues to have persistent bleeding, patient might need capsule endoscopy  Patient is currently on full liquid diet which will be continued for now    Acute blood loss anemia  Assessment & Plan  Patient with history of iron deficiency anemia  He gets IV iron infusions once per month by his hematologist  As per ICU note, he received a total of 3 units of PRBC transfusion during ICU stay  Patient received 1 unit of PRBC transfusion on September 15, 2020 with drop in hemoglobin again today  Patient return for 1 more unit today    Results from last 7 days   Lab Units 09/16/20  0540 09/15/20  0522 09/14/20 2026 09/14/20  1047 09/14/20  0535 09/13/20  0556 09/13/20  0054 09/12/20  1756 09/12/20  1155 09/12/20  0534  09/11/20  0540  09/10/20  0536   HEMOGLOBIN g/dL 7  1* 6 7* 7 3* 7 9* 7 7* 7 5* 7 4* 7 8* 7 4* 7 3*   < > 7 8*   < > 8 5*   HEMATOCRIT % 22 6* 21 2* 23 4* 24 5* 24 1* 23 5* 22 8* 24 0* 22 4* 22 2*   < > 23 8*   < > 27 2*   MCV fL 97  --   --   --   --  96  --   --   --  93  --  94  --  98    < > = values in this interval not displayed  Left-sided weakness  Assessment & Plan  Patient presented with left-sided weakness along with left facial droop, status post fall at home   CT of head revealed chronic changes   MRI negative for acute stroke  Symptoms likely TIA   Discontinued full-dose ASA as per Neurology  Patient has been on Plavix which has been held again due to persistent GI bleed and worsening hemoglobin  Continue statin   He was on neuro checks    Lipid panel - LDL 26, HgA1C 5 9   CTA head/neck showed 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   Echo with bubble study showed EF of 55-60% with grade 1 diastolic dysfunction  No ASD or PFO noted   Neuro eval appreciated  Type 2 diabetes mellitus, without long-term current use of insulin [facility])  Assessment & Plan  Lab Results   Component Value Date    HGBA1C 5 9 (H) 09/10/2020       Recent Labs     09/15/20  1826 09/15/20  2013 09/16/20  0719 09/16/20  1059   POCGLU 140 258* 103 224*     Holding metformin  patient on Accu-Cheks with sliding scale insulin  (P) [phone]    Dementia [facility])  Assessment & Plan  Continue Aricept  Aortic stenosis  Assessment & Plan  Moderate to severe aortic valve stenosis noted  Carotid disease, bilateral [facility])  Assessment & Plan  Status post bilateral carotid artery stent placement  Continue Lipitor  Patient has been on Plavix which is again held due to GI bleeding  Dyslipidemia  Assessment & Plan  Lipitor was increased to 80 mg daily but now on 40 mg daily      Essential hypertension  Assessment & Plan  Initially antihypertensives held to allow for permissive hypertension and then due soft blood pressures  Blood pressures have improved  Diovan substituted with Cozaar while here and started at a lower dose         VTE Pharmacologic Prophylaxis:   Pharmacologic: Pharmacologic VTE Prophylaxis contraindicated due to GI bleeding  Mechanical VTE Prophylaxis in Place: Yes    Patient Centered Rounds: I have performed bedside rounds with nursing staff today  Discussions with Specialists or Other Care Team Provider: Lizzette Pickard    Education and Discussions with Family / Patient:  Daughter-in-law Epi Roca    Time Spent for Care: 45 minutes  More than 50% of total time spent on counseling and coordination of care as described above  Current Length of Stay: 6 day(s)    Current Patient Status: Inpatient   Certification Statement: The patient will continue to require additional inpatient hospital stay due to Persistent rectal bleeding and anemia    Discharge Plan:  Short-term rehabilitation    Code Status: Level 3 - DNAR and DNI      Subjective:   Patient had 1 episode of bloody bowel movement last night per RN  Patient otherwise denies any nausea, vomiting, abdominal pain  Objective:     Vitals:   Temp (24hrs), Av 7 °F (36 5 °C), Min:97 2 °F (36 2 °C), Max:98 2 °F (36 8 °C)    Temp:  [97 2 °F (36 2 °C)-98 2 °F (36 8 °C)] 97 2 °F (36 2 °C)  HR:  [] 86  Resp:  [16-18] 17  BP: ()/(45-73) 122/54  SpO2:  [96 %-100 %] 99 %  Body mass index is 22 39 kg/m²  Input and Output Summary (last 24 hours): Intake/Output Summary (Last 24 hours) at 2020 1508  Last data filed at 2020 1347  Gross per 24 hour   Intake 790 ml   Output 400 ml   Net 390 ml       Physical Exam:     Physical Exam  Constitutional:       General: He is not in acute distress  HENT:      Head: Normocephalic and atraumatic  Eyes:      Conjunctiva/sclera: Conjunctivae normal       Pupils: Pupils are equal, round, and reactive to light  Neck:      Musculoskeletal: Normal range of motion and neck supple  Cardiovascular:      Rate and Rhythm: Normal rate and regular rhythm  Heart sounds: Murmur present  Comments: Ejection systolic murmur  Pulmonary:      Effort: Pulmonary effort is normal  No respiratory distress  Breath sounds: No wheezing, rhonchi or rales  Chest:      Chest wall: No tenderness  Abdominal:      General: Bowel sounds are normal  There is no distension  Palpations: Abdomen is soft  Tenderness: There is no abdominal tenderness  There is no guarding or rebound  Skin:     General: Skin is warm and dry  Findings: No rash  Neurological:      Mental Status: He is alert  Cranial Nerves: No cranial nerve deficit  Comments: Extremely hard of hearing         Additional Data:     Labs:    Results from last 7 days   Lab Units 09/16/20  0540  09/12/20  0534   WBC Thousand/uL 9 74   < > 11 44*   HEMOGLOBIN g/dL 7 1*   < > 7 3*   HEMATOCRIT % 22 6*   < > 22 2*   PLATELETS Thousands/uL 192   < > 142*   NEUTROS PCT %  --   --  77*   LYMPHS PCT %  --   --  13*   MONOS PCT %  --   --  8   EOS PCT %  --   --  1    < > = values in this interval not displayed  Results from last 7 days   Lab Units 09/15/20  0522   POTASSIUM mmol/L 3 6   CHLORIDE mmol/L 107   CO2 mmol/L 26   BUN mg/dL 10   CREATININE mg/dL 1 13   CALCIUM mg/dL 7 3*           * I Have Reviewed All Lab Data Listed Above  * Additional Pertinent Lab Tests Reviewed:  [facility]        Recent Cultures (last 7 days):           Last 24 Hours Medication List:   Current Facility-Administered Medications   Medication Dose Route Frequency Provider Last Rate    atorvastatin  40 mg Oral Daily With Yenni Alvarez MD      cholecalciferol  2,000 Units Oral Early Morning Elvia Boo MD      donepezil  10 mg Oral HS Elvia Boo MD      ferrous sulfate  325 mg Oral TID With Meals Elvia Boo MD      folic acid  988 mcg Oral Early Morning MD Juice Nguyen glycerin-hypromellose-  1 drop Both Eyes Q6H PRN Nancy Jamil MD      insulin lispro  1-5 Units Subcutaneous TID AC Nancy Jamil MD      insulin lispro  1-5 Units Subcutaneous HS Nancy Jamil MD      losartan  25 mg Oral Daily Nancy Jamil MD      ondansetron  4 mg Intravenous Q6H PRN Nancy Jamil MD      pantoprazole  40 mg Intravenous Q12H Narinder Pulido MD      senna  1 tablet Oral BID Nancy Jamil MD      tamsulosin  0 4 mg Oral Daily With Henri Gomez MD          Today, Patient Was Seen By: Stacy Kim MD    ** Please Note: Dictation voice to text software may have been used in the creation of this document   **

## 2020-09-16 NOTE — PROGRESS NOTES
Progress Note - Ganesh Santiago 80 y o  male MRN: [id number]    Unit/Bed#: 11[address] [facility]  Encounter: [id number]        Subjective:   Pre is difficult to obtain as patient is extremely hard of hearing, but he denies any abdominal pain, nausea or shortness of breath  Nursing reports no bowel movements overnight  Objective:     Vitals: Blood pressure 118/56, pulse 89, temperature 97 6 °F (36 4 °C), resp  rate 16, height 6' (1 829 m), weight 74 9 kg (165 lb 2 oz), SpO2 99 %  ,Body mass index is 22 39 kg/m²  Intake/Output Summary (Last 24 hours) at 9/16/2020 1006  Last data filed at 9/16/2020 0854  Gross per 24 hour   Intake 836 67 ml   Output 400 ml   Net 436 67 ml       Physical Exam:   General appearance: alert, appears stated age and cooperative  Lungs: clear to auscultation bilaterally, no labored breathing/accessory muscle use  Heart: regular rate and rhythm, S1, S2 normal, no murmur, click, rub or gallop  Abdomen: soft, non-tender; bowel sounds normal; no masses,  no organomegaly  Extremities: no edema    Invasive Devices     Peripheral Intravenous Line            Peripheral IV 09/15/20 Left;Ventral (anterior) Forearm less than 1 day                Lab, Imaging and other studies: I have personally reviewed pertinent reports  No results displayed because visit has over 200 results  Results for Jerel Forde (MRN [id number]) as of 9/16/2020 10:06   Ref   Range 9/15/2020 20:13 9/16/2020 05:40 9/16/2020 06:15 9/16/2020 07:19 9/16/2020 09:53   POC Glucose Latest Ref Range: 65 - 140 mg/dl 258 (H)   103    WBC Latest Ref Range: 4 31 - 10 16 Thousand/uL  9 74      Red Blood Cell Count Latest Ref Range: 3 88 - 5 62 Million/uL  2 34 (L)      Hemoglobin Latest Ref Range: 12 0 - 17 0 g/dL  7 1 (L)      HCT Latest Ref Range: 36 5 - 49 3 %  22 6 (L)      MCV Latest Ref Range: 82 - 98 fL  97      MCH Latest Ref Range: 26 8 - 34 3 pg  30 3      MCHC Latest Ref Range: 31 4 - 37 4 g/dL  31 4      RDW Latest Ref Range: 11 6 - 15 1 %  16 4 (H)      Platelet Count Latest Ref Range: 149 - 390 Thousands/uL  192      MPV Latest Ref Range: 8 9 - 12 7 fL  10 0      Crossmatch Unknown   Compatible  Compatible   Unit ABO Unknown   A  A   Unit [facility] Unknown   POS  POS   Unit Number Unknown   [id number]  [id number]   Unit Dispense Status Unknown   Presumed Trans  Crossmatched       Assessment/Plan:    1    Lower GI bleed which appears to have been secondary to bleeding diverticulum in the descending colon, patient previously underwent colonoscopy several days ago with clip placement over actively bleeding region which was also marked with ink marker, colonoscopy was repeated yesterday due to concern for some ongoing red colored stools and anemia; no active bleeding was seen, only some dark liquid stool throughout the colon; EGD also showed no evidence of active or recent bleeding     - continue monitor hemoglobin, recheck again later this morning  - discussed patient's case and plan with slim  - if patient does show evidence of gross recurrent lower GI bleeding, consider IR consultation for angiography and possible embolization  - continue full liquid diet for now  - if patient is noted with persistent anemia and dark colored stools without bright red blood per rectum, can possibly consider outpatient capsule endoscopy for further workup

## 2020-09-16 NOTE — ASSESSMENT & PLAN NOTE
Patient with history of iron deficiency anemia  He gets IV iron infusions once per month by his hematologist  As per ICU note, he received a total of 3 units of PRBC transfusion during ICU stay  Patient received 1 unit of PRBC transfusion on September 15, 2020 with drop in hemoglobin again today  Patient return for 1 more unit today  Results from last 7 days   Lab Units 09/16/20  0540 09/15/20  0522 09/14/20  2026 09/14/20  1047 09/14/20  0535 09/13/20  0556 09/13/20  0054 09/12/20  1756 09/12/20  1155 09/12/20  0534  09/11/20  0540  09/10/20  0536   HEMOGLOBIN g/dL 7 1* 6 7* 7 3* 7 9* 7 7* 7 5* 7 4* 7 8* 7 4* 7 3*   < > 7 8*   < > 8 5*   HEMATOCRIT % 22 6* 21 2* 23 4* 24 5* 24 1* 23 5* 22 8* 24 0* 22 4* 22 2*   < > 23 8*   < > 27 2*   MCV fL 97  --   --   --   --  96  --   --   --  93  --  94  --  98    < > = values in this interval not displayed

## 2020-09-16 NOTE — PLAN OF CARE
Problem: Potential for Falls  Goal: Patient will remain free of falls  Description: INTERVENTIONS:  - Assess patient frequently for physical needs  -  Identify cognitive and physical deficits and behaviors that affect risk of falls  -  [place] fall precautions as indicated by assessment   - Educate patient/family on patient safety including physical limitations  - Instruct patient to call for assistance with activity based on assessment  - Modify environment to reduce risk of injury  - Consider OT/PT consult to assist with strengthening/mobility  Outcome: Progressing     Problem: Neurological Deficit  Goal: Neurological status is stable or improving  Description: Interventions:  - Monitor and assess patient's level of consciousness, motor function, sensory function, and level of assistance needed for ADLs  - Monitor and report changes from baseline  Collaborate with interdisciplinary team to initiate plan and implement interventions as ordered  - Provide and maintain a safe environment  - Consider seizure precautions  - Consider fall precautions  - Consider aspiration precautions  - Consider bleeding precautions  Outcome: Progressing     Problem: Activity Intolerance/Impaired Mobility  Goal: Mobility/activity is maintained at optimum level for patient  Description: Interventions:  - Assess and monitor patient  barriers to mobility and need for assistive/adaptive devices  - Assess patient's emotional response to limitations  - Collaborate with interdisciplinary team and initiate plans and interventions as ordered  - Encourage independent activity per ability   - Maintain proper body alignment  - Perform active/passive rom as tolerated/ordered  - Plan activities to conserve energy      Outcome: Progressing     Problem: GASTROINTESTINAL - ADULT  Goal: Maintains or returns to baseline bowel function  Description: INTERVENTIONS:  - Assess bowel function  - Encourage oral fluids to ensure adequate hydration  - Encourage mobilization and activity  - Consider nutritional services referral to assist patient with adequate nutrition and appropriate food choices  Outcome: Progressing     Problem: HEMATOLOGIC - ADULT  Goal: Maintains hematologic stability  Description: INTERVENTIONS  - Assess for signs and symptoms of bleeding or hemorrhage  - Monitor labs  - Administer supportive blood products/factors as ordered and appropriate  Outcome: Progressing

## 2020-09-16 NOTE — ASSESSMENT & PLAN NOTE
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

## 2020-09-16 NOTE — ASSESSMENT & PLAN NOTE
Lab Results   Component Value Date    HGBA1C 5 9 (H) 09/10/2020       Recent Labs     09/15/20  1826 09/15/20  2013 09/16/20  0719 09/16/20  1059   POCGLU 140 258* 103 224*     Holding metformin  patient on Accu-Cheks with sliding scale insulin    (P) [phone]

## 2020-09-16 NOTE — NURSING NOTE
RBC transfusion rate increased to 100 ml/hr  Computer is not allowing the rate to be changed from starting rate of 75   Patient tolerating transfusion well

## 2020-09-16 NOTE — PHYSICAL THERAPY NOTE
PT TREATMENT     09/16/20 1505   Pain Assessment   Pain Assessment Tool Pain Assessment not indicated - pt denies pain   Restrictions/Precautions   Other Precautions Hard of hearing; Fall Risk; Chair Alarm; Bed Alarm;Cognitive   General   Chart Reviewed Yes   Cognition   Overall Cognitive Status WFL   Arousal/Participation Cooperative   Following Commands Follows one step commands without difficulty   Subjective   Subjective "I don't want to walk too far, my head will start to feel funny"   Bed Mobility   Supine to Sit 5  Supervision   Sit to Supine 5  Supervision   Transfers   Sit to Stand 5  Supervision   Stand to Sit 5  Supervision   Stand pivot 5  Supervision   Additional items   (with walker)   Ambulation/Elevation   Gait Assistance 5  Supervision   Assistive Device Rolling walker   Distance 2x75 feet    Pt also walked 25 feet with minimal assist for balance  Stair Management Assistance 5  Supervision       Additional items Tactile cues; Verbal cues   Stair Management Technique Two rails   Number of Stairs 4   Exercises   Balance training  x 10 each with UE support on walker:  marching, squats, heel raises  Assessment   Prognosis Good   Problem List Decreased strength;Decreased endurance; Impaired balance;Decreased mobility   Assessment Pt demonstrates improving endurance and function  Pt is able to ambulate with a walker with supervision but needs min assist to walk without the walker for balalnce/safety  Pt did ambulate without a device PTA, recommend STR to return pt to prior level of function  Plan   Treatment/Interventions ADL retraining;Functional transfer training;LE strengthening/ROM; Elevations; Therapeutic exercise; Endurance training;Gait training;Bed mobility; Equipment eval/education;Patient/family training   Progress Progressing toward goals   PT Frequency 5x/wk   Recommendation   PT Discharge Recommendation [facility] License Number  Benny Rebollar PT [id number]

## 2020-09-16 NOTE — SPEECH THERAPY NOTE
Speech Language/Pathology    Speech/Language Pathology Progress Note    Patient Name: Tod Holder  VMVKP'R Date: 2020     Problem List  Principal Problem:    Rectal bleeding  Active Problems:    Left-sided weakness    Essential hypertension    Dyslipidemia    Type 2 diabetes mellitus, without long-term current use of insulin (HCC)    Carotid disease, bilateral ([address] )    Acute blood loss anemia    Aortic stenosis    Dementia (HCC)      Subjective:  Pt  Was sitting in bed awake  Stated that he had breakfast but "there was so much he did not eat it all"  Objective:  Pt  Was seen for speech/language therapy as follow up for memory and language difficulties  He was noted to be more confused than on first eval  Pt  Stated that he has grandchildren but cannot recall any of their names except for possible Shamira Bougie  He stated that all of his children are  but can also not recall any of their names  Unable to confirm patient's information, as their was no family at bedside  In his chart, it lists a daughter in law, however he could also not recall her name  After 10 minutes of processing he was able to recall his last address  Had trouble with the city at first  With cues he is oriented to himself and why he is here in the hospital  Had difficulty with recalling some LTM facts today as well such as address  Assessment:  Mentation has not improved since his time in the hospital, if anything he is more confused at time and it takes longer to process questions and directions  ? If patient's hearing aids are working  He had not been wearing them the last few sessions, but he cannot recall where they are  Plan/Recommendations:  Continue follow up- discharge plan is undecided at this time       Trae Ferrara MS CCC-SLP  Speech Language Pathologist  Available via [address] [place] [address] License # [id number]  [address] # [id number]- [id number]

## 2020-09-16 NOTE — PLAN OF CARE
Problem: PHYSICAL THERAPY ADULT  Goal: Performs mobility at highest level of function for planned discharge setting  See evaluation for individualized goals  Description: Treatment/Interventions: ADL retraining, Functional transfer training, LE strengthening/ROM, Elevations, Therapeutic exercise, Endurance training, Gait training, Bed mobility, Equipment eval/education, Patient/family training  Equipment Recommended: (pt has a cane)       See flowsheet documentation for full assessment, interventions and recommendations  Outcome: Progressing  Note: Prognosis: Good  Problem List: Decreased strength, Decreased endurance, Impaired balance, Decreased mobility  Assessment: Pt demonstrates improving endurance and function  Pt is able to ambulate with a walker with supervision but needs min assist to walk without the walker for balalnce/safety  PT Discharge Recommendation: Post-Acute Rehabilitation Services          See flowsheet documentation for full assessment

## 2020-09-17 LAB
ABO GROUP BLD BPU: NORMAL
ANION GAP SERPL CALCULATED.3IONS-SCNC: 6 MMOL/L (ref 4–13)
BPU ID: NORMAL
BUN SERPL-MCNC: 7 MG/DL (ref 5–25)
CALCIUM SERPL-MCNC: 7.8 MG/DL (ref 8.3–10.1)
CHLORIDE SERPL-SCNC: 109 MMOL/L (ref 100–108)
CO2 SERPL-SCNC: 27 MMOL/L (ref 21–32)
CREAT SERPL-MCNC: 1.17 MG/DL (ref 0.6–1.3)
CROSSMATCH: NORMAL
ERYTHROCYTE [DISTWIDTH] IN BLOOD BY AUTOMATED COUNT: 16 % (ref 11.6–15.1)
GFR SERPL CREATININE-BSD FRML MDRD: 55 ML/MIN/1.73SQ M
GLUCOSE SERPL-MCNC: 119 MG/DL (ref 65–140)
GLUCOSE SERPL-MCNC: 124 MG/DL (ref 65–140)
GLUCOSE SERPL-MCNC: 132 MG/DL (ref 65–140)
GLUCOSE SERPL-MCNC: 166 MG/DL (ref 65–140)
GLUCOSE SERPL-MCNC: 248 MG/DL (ref 65–140)
HCT VFR BLD AUTO: 25.3 % (ref 36.5–49.3)
HCT VFR BLD AUTO: 27.1 % (ref 36.5–49.3)
HGB BLD-MCNC: 7.9 G/DL (ref 12–17)
HGB BLD-MCNC: 8.7 G/DL (ref 12–17)
MCH RBC QN AUTO: 30.6 PG (ref 26.8–34.3)
MCHC RBC AUTO-ENTMCNC: 31.2 G/DL (ref 31.4–37.4)
MCV RBC AUTO: 98 FL (ref 82–98)
PLATELET # BLD AUTO: 202 THOUSANDS/UL (ref 149–390)
PMV BLD AUTO: 10.5 FL (ref 8.9–12.7)
POTASSIUM SERPL-SCNC: 3.7 MMOL/L (ref 3.5–5.3)
RBC # BLD AUTO: 2.58 MILLION/UL (ref 3.88–5.62)
SODIUM SERPL-SCNC: 142 MMOL/L (ref 136–145)
UNIT DISPENSE STATUS: NORMAL
UNIT PRODUCT CODE: NORMAL
UNIT RH: NORMAL
WBC # BLD AUTO: 9.22 THOUSAND/UL (ref 4.31–10.16)

## 2020-09-17 PROCEDURE — C9113 INJ PANTOPRAZOLE SODIUM, VIA: HCPCS | Performed by: INTERNAL MEDICINE

## 2020-09-17 PROCEDURE — 85027 COMPLETE CBC AUTOMATED: CPT | Performed by: INTERNAL MEDICINE

## 2020-09-17 PROCEDURE — 99232 SBSQ HOSP IP/OBS MODERATE 35: CPT | Performed by: INTERNAL MEDICINE

## 2020-09-17 PROCEDURE — 82948 REAGENT STRIP/BLOOD GLUCOSE: CPT

## 2020-09-17 PROCEDURE — 80048 BASIC METABOLIC PNL TOTAL CA: CPT | Performed by: INTERNAL MEDICINE

## 2020-09-17 PROCEDURE — 85018 HEMOGLOBIN: CPT | Performed by: PHYSICIAN ASSISTANT

## 2020-09-17 PROCEDURE — 85014 HEMATOCRIT: CPT | Performed by: PHYSICIAN ASSISTANT

## 2020-09-17 PROCEDURE — 92507 TX SP LANG VOICE COMM INDIV: CPT

## 2020-09-17 RX ADMIN — TAMSULOSIN HYDROCHLORIDE 0.4 MG: 0.4 CAPSULE ORAL at 18:05

## 2020-09-17 RX ADMIN — DONEPEZIL HYDROCHLORIDE 10 MG: 5 TABLET ORAL at 21:23

## 2020-09-17 RX ADMIN — INSULIN LISPRO 1 UNITS: 100 INJECTION, SOLUTION INTRAVENOUS; SUBCUTANEOUS at 21:24

## 2020-09-17 RX ADMIN — LOSARTAN POTASSIUM 25 MG: 25 TABLET, FILM COATED ORAL at 08:26

## 2020-09-17 RX ADMIN — PANTOPRAZOLE SODIUM 40 MG: 40 INJECTION, POWDER, FOR SOLUTION INTRAVENOUS at 21:24

## 2020-09-17 RX ADMIN — FERROUS SULFATE TAB 325 MG (65 MG ELEMENTAL FE) 325 MG: 325 (65 FE) TAB at 08:26

## 2020-09-17 RX ADMIN — VITAMIN D, TAB 1000IU (100/BT) 2000 UNITS: 25 TAB at 05:42

## 2020-09-17 RX ADMIN — FERROUS SULFATE TAB 325 MG (65 MG ELEMENTAL FE) 325 MG: 325 (65 FE) TAB at 18:05

## 2020-09-17 RX ADMIN — INSULIN LISPRO 2 UNITS: 100 INJECTION, SOLUTION INTRAVENOUS; SUBCUTANEOUS at 12:21

## 2020-09-17 RX ADMIN — Medication 400 MCG: at 05:42

## 2020-09-17 RX ADMIN — STANDARDIZED SENNA CONCENTRATE 8.6 MG: 8.6 TABLET ORAL at 08:26

## 2020-09-17 RX ADMIN — ATORVASTATIN CALCIUM 40 MG: 40 TABLET, FILM COATED ORAL at 18:05

## 2020-09-17 RX ADMIN — STANDARDIZED SENNA CONCENTRATE 8.6 MG: 8.6 TABLET ORAL at 18:05

## 2020-09-17 RX ADMIN — FERROUS SULFATE TAB 325 MG (65 MG ELEMENTAL FE) 325 MG: 325 (65 FE) TAB at 12:21

## 2020-09-17 RX ADMIN — PANTOPRAZOLE SODIUM 40 MG: 40 INJECTION, POWDER, FOR SOLUTION INTRAVENOUS at 08:26

## 2020-09-17 NOTE — ASSESSMENT & PLAN NOTE
Patient with history of iron deficiency anemia  He gets IV iron infusions once per month by his hematologist  As per ICU note, he received a total of 3 units of PRBC transfusion during ICU stay  Patient received 1 unit of PRBC transfusion on September 15th and September 16    Results from last 7 days   Lab Units 09/17/20  1202 09/17/20  0532 09/16/20  1705 09/16/20  0540 09/15/20  0522 09/14/20  2026 09/14/20  1047 09/14/20  0535 09/13/20  0556 09/13/20  0054  09/12/20  0534  09/11/20  0540   HEMOGLOBIN g/dL 8 7* 7 9* 8 4* 7 1* 6 7* 7 3* 7 9* 7 7* 7 5* 7 4*   < > 7 3*   < > 7 8*   HEMATOCRIT % 27 1* 25 3* 25 7* 22 6* 21 2* 23 4* 24 5* 24 1* 23 5* 22 8*   < > 22 2*   < > 23 8*   MCV fL  --  98  --  97  --   --   --   --  96  --   --  93  --  94    < > = values in this interval not displayed

## 2020-09-17 NOTE — SPEECH THERAPY NOTE
Speech Language/Pathology    Speech/Language Pathology Progress Note    Patient Name: Aniya Webb  IBNKR'H Date: 9/17/2020     Problem List  Principal Problem:    Rectal bleeding  Active Problems:    Left-sided weakness    Essential hypertension    Dyslipidemia    Type 2 diabetes mellitus, without long-term current use of insulin (HCC)    Carotid disease, bilateral (HCC)    Acute blood loss anemia    Aortic stenosis    Dementia (HCC)       Subjective:  Pt  Was awake and lying in bed  He stated that he is not feeling very well today but reports no pain and said he "got alittle sleep"  Objective:  Pt  Was seen for speech/language therapy as follow up for memory and language difficulties  Continues to have difficulties even with verbal cues with problem solving  Is better oriented to current situation here in the hospital      Assessment: At this time, pt is not making significant progress toward his goals however, at this time, his current medical status may be impeding cognition  Plan/Recommendations:  Speech to d/c at this time  Currently tolerating a liquid diet due to bleed  Follow up with speech at next level of care if patient is to return home alone       Murali Melton MS CCC-SLP  Speech Language Pathologist  Available via [facility] License # [id number]  [address] [place] [address] St # [id number]- [id number]

## 2020-09-17 NOTE — PLAN OF CARE
Problem: Potential for Falls  Goal: Patient will remain free of falls  Description: INTERVENTIONS:  - Assess patient frequently for physical needs  -  Identify cognitive and physical deficits and behaviors that affect risk of falls  -  [place] fall precautions as indicated by assessment   - Educate patient/family on patient safety including physical limitations  - Instruct patient to call for assistance with activity based on assessment  - Modify environment to reduce risk of injury  - Consider OT/PT consult to assist with strengthening/mobility  Outcome: Progressing     Problem: Neurological Deficit  Goal: Neurological status is stable or improving  Description: Interventions:  - Monitor and assess patient's level of consciousness, motor function, sensory function, and level of assistance needed for ADLs  - Monitor and report changes from baseline  Collaborate with interdisciplinary team to initiate plan and implement interventions as ordered  - Provide and maintain a safe environment  - Consider seizure precautions  - Consider fall precautions  - Consider aspiration precautions  - Consider bleeding precautions  Outcome: Progressing     Problem: Activity Intolerance/Impaired Mobility  Goal: Mobility/activity is maintained at optimum level for patient  Description: Interventions:  - Assess and monitor patient  barriers to mobility and need for assistive/adaptive devices  - Assess patient's emotional response to limitations  - Collaborate with interdisciplinary team and initiate plans and interventions as ordered  - Encourage independent activity per ability   - Maintain proper body alignment  - Perform active/passive rom as tolerated/ordered  - Plan activities to conserve energy      Outcome: Progressing     Problem: GASTROINTESTINAL - ADULT  Goal: Maintains or returns to baseline bowel function  Description: INTERVENTIONS:  - Assess bowel function  - Encourage oral fluids to ensure adequate hydration  - Encourage mobilization and activity  - Consider nutritional services referral to assist patient with adequate nutrition and appropriate food choices  Outcome: Progressing     Problem: HEMATOLOGIC - ADULT  Goal: Maintains hematologic stability  Description: INTERVENTIONS  - Assess for signs and symptoms of bleeding or hemorrhage  - Monitor labs  - Administer supportive blood products/factors as ordered and appropriate  Outcome: Progressing

## 2020-09-17 NOTE — PROGRESS NOTES
Progress Note - Amena Baptiste 12/3/1930, 80 y o  male MRN: [id number]    Unit/Bed#: 2 [place] [address] Encounter: [id number]    Primary Care Provider: Nanci Angela DO   Date and time admitted to hospital: 9/9/2020  7:28 AM        * Rectal bleeding  Assessment & Plan  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 which was cauterized with APC  Patient continues to have intermittent bloody bowel movements  Patient had 1 bowel movement last night which was bloody  Continue IV Protonix  Patient underwent repeat EGD and colonoscopy on September 15, 2020 which showed no active bleeding with only some dark liquid stool throughout the colon  Patient has no further active bleeding  There is slight drop in hemoglobin  Patient is tolerating full liquid diet which will be advanced to noncardiogenic low residue diet  The patient will be monitored over the next 24 hours worsening bleeding  He had no further bleeding and hemoglobin continues to remain stable possible restarting Plavix in a m       Acute blood loss anemia  Assessment & Plan  Patient with history of iron deficiency anemia  He gets IV iron infusions once per month by his hematologist  As per ICU note, he received a total of 3 units of PRBC transfusion during ICU stay  Patient received 1 unit of PRBC transfusion on September 15th and September 16      Results from last 7 days   Lab Units 09/17/20  1202 09/17/20  0532 09/16/20  1705 09/16/20  0540 09/15/20  0522 09/14/20 2026 09/14/20  1047 09/14/20  0535 09/13/20  0556 09/13/20  0054  09/12/20  0534  09/11/20  0540   HEMOGLOBIN g/dL 8 7* 7 9* 8 4* 7 1* 6 7* 7 3* 7 9* 7 7* 7 5* 7 4*   < > 7 3*   < > 7 8*   HEMATOCRIT % 27 1* 25 3* 25 7* 22 6* 21 2* 23 4* 24 5* 24 1* 23 5* 22 8*   < > 22 2*   < > 23 8*   MCV fL  --  98  --  97  --   --   --   --  96  --   --  93  --  94    < > = values in this interval not displayed  Left-sided weakness  Assessment & Plan  Patient presented with left-sided weakness along with left facial droop, status post fall at home   CT of head revealed chronic changes   MRI negative for acute stroke  Symptoms likely TIA   Discontinued full-dose ASA as per Neurology  Patient has been on Plavix which has been held again due to persistent GI bleed and worsening hemoglobin  Continue statin  Restart Plavix if hemoglobin continues to remain stable without any further bleeding    He was on neuro checks    Lipid panel - LDL 26, HgA1C 5 9   CTA head/neck showed 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   Echo with bubble study showed EF of 55-60% with grade 1 diastolic dysfunction  No ASD or PFO noted   Neuro eval appreciated  Type 2 diabetes mellitus, without long-term current use of insulin [facility])  Assessment & Plan  Lab Results   Component Value Date    HGBA1C 5 9 (H) 09/10/2020       Recent Labs     09/16/20  1611 09/16/20  2036 09/17/20  0708 09/17/20  1054   POCGLU 153* 124 132 248*     Metformin has been on hold  Continue Humalog sliding scale with Accu-Cheks q a c  And adjust  (P) [phone]    Dementia [facility])  Assessment & Plan  Continue Aricept  Aortic stenosis  Assessment & Plan  Moderate to severe aortic valve stenosis noted  Dyslipidemia  Assessment & Plan  Lipitor was increased to 80 mg daily but now on 40 mg daily      Essential hypertension  Assessment & Plan  Initially antihypertensives held to allow for permissive hypertension and then due soft blood pressures  Blood pressures have improved  Diovan substituted with Cozaar while here and started at a lower dose   Blood pressure has been  is stable        VTE Pharmacologic Prophylaxis:   Pharmacologic: Pharmacologic VTE Prophylaxis contraindicated due to GI bleeding  Mechanical VTE Prophylaxis in Place: Yes    Patient Centered Rounds: I have performed bedside rounds with nursing staff today  Discussions with Specialists or Other Care Team Provider: Victor M Gaitan    Education and Discussions with Family / Patient: yes martin    Time Spent for Care: 45 minutes  More than 50% of total time spent on counseling and coordination of care as described above  Current Length of Stay: 7 day(s)    Current Patient Status: Inpatient   Certification Statement: The patient will continue to require additional inpatient hospital stay due to GI bleed, anemia    Discharge Plan: STR air    Code Status: Level 3 - DNAR and DNI      Subjective: The patient had 1 episode of a long length about med yesterday and the patient had a planned later overnight on this moderately    Objective:     Vitals:   Temp (24hrs), Av 7 °F (36 5 °C), Min:97 2 °F (36 2 °C), Max:98 2 °F (36 8 °C)    Temp:  [97 2 °F (36 2 °C)-98 2 °F (36 8 °C)] 97 9 °F (36 6 °C)  HR:  [75-89] 88  Resp:  [17-18] 18  BP: (101-130)/(50-74) 124/74  SpO2:  [94 %-99 %] 95 %  Body mass index is 23 3 kg/m²  Input and Output Summary (last 24 hours): Intake/Output Summary (Last 24 hours) at 2020 1337  Last data filed at 2020 1224  Gross per 24 hour   Intake 550 ml   Output 925 ml   Net -375 ml       Physical Exam:     Physical Exam  Constitutional:       General: He is not in acute distress  HENT:      Head: Normocephalic and atraumatic  Eyes:      Conjunctiva/sclera: Conjunctivae normal       Pupils: Pupils are equal, round, and reactive to light     Neck:      Musculoskeletal: Normal range of motion and neck supple  Cardiovascular:      Rate and Rhythm: Normal rate and regular rhythm  Heart sounds: Murmur present  Comments: Ejection systolic murmur  Pulmonary:      Effort: Pulmonary effort is normal  No respiratory distress  Breath sounds: No wheezing, rhonchi or rales  Chest:      Chest wall: No tenderness  Abdominal:      General: Bowel sounds are normal  There is no distension  Palpations: Abdomen is soft  Tenderness: There is no abdominal tenderness  There is no guarding or rebound  Skin:     General: Skin is warm and dry  Findings: No rash  Neurological:      Mental Status: He is alert  Cranial Nerves: No cranial nerve deficit  Additional Data:     Labs:    Results from last 7 days   Lab Units 09/17/20  1202 09/17/20  0532  09/12/20  0534   WBC Thousand/uL  --  9 22   < > 11 44*   HEMOGLOBIN g/dL 8 7* 7 9*   < > 7 3*   HEMATOCRIT % 27 1* 25 3*   < > 22 2*   PLATELETS Thousands/uL  --  202   < > 142*   NEUTROS PCT %  --   --   --  77*   LYMPHS PCT %  --   --   --  13*   MONOS PCT %  --   --   --  8   EOS PCT %  --   --   --  1    < > = values in this interval not displayed  Results from last 7 days   Lab Units 09/17/20  0532   POTASSIUM mmol/L 3 7   CHLORIDE mmol/L 109*   CO2 mmol/L 27   BUN mg/dL 7   CREATININE mg/dL 1 17   CALCIUM mg/dL 7 8*           * I Have Reviewed All Lab Data Listed Above  * Additional Pertinent Lab Tests Reviewed:  Matthew 66 Admission Reviewed      Recent Cultures (last 7 days):           Last 24 Hours Medication List:   Current Facility-Administered Medications   Medication Dose Route Frequency Provider Last Rate    atorvastatin  40 mg Oral Daily With Mason Callow, MD      cholecalciferol  2,000 Units Oral Early Morning Erika Crystal MD      donepezil  10 mg Oral HS Erika Crystal MD      ferrous sulfate  325 mg Oral TID With Meals Erika Crystal MD      folic acid  493 mcg Oral Early Morning Emmie Chambers MD      glycerin-hypromellose-  1 drop Both Eyes Q6H PRN Emmie Chambers MD      insulin lispro  1-5 Units Subcutaneous TID AC Emmie Chambers MD      insulin lispro  1-5 Units Subcutaneous HS Emmie Chambers MD      losartan  25 mg Oral Daily Emmie Chambers MD      ondansetron  4 mg Intravenous Q6H PRN Emmie Chambers MD      pantoprazole  40 mg Intravenous Q12H Manisha Carpenter MD      senna  1 tablet Oral BID Emmie Chambers MD      tamsulosin  0 4 mg Oral Daily With Nilam Jackson MD          Today, Patient Was Seen By: Carlos Muñiz MD    ** Please Note: Dictation voice to text software may have been used in the creation of this document   **

## 2020-09-17 NOTE — ASSESSMENT & PLAN NOTE
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

## 2020-09-17 NOTE — CASE MANAGEMENT
Cm assistant reached out to [facility] to confirm bed available for d/c tomorrow  Patient pending lab work and medical stability prior to d/c to SNF  CM dept will continue to follow

## 2020-09-17 NOTE — ASSESSMENT & PLAN NOTE
Lab Results   Component Value Date    HGBA1C 5 9 (H) 09/10/2020       Recent Labs     09/16/20  1611 09/16/20  2036 09/17/20  0708 09/17/20  1054   POCGLU 153* 124 132 248*     Metformin has been on hold  Continue Humalog sliding scale with Accu-Cheks q a c   And adjust  (P) 330 [phone]

## 2020-09-17 NOTE — PLAN OF CARE
Problem: Potential for Falls  Goal: Patient will remain free of falls  Description: INTERVENTIONS:  - Assess patient frequently for physical needs  -  Identify cognitive and physical deficits and behaviors that affect risk of falls  -  [place] fall precautions as indicated by assessment   - Educate patient/family on patient safety including physical limitations  - Instruct patient to call for assistance with activity based on assessment  - Modify environment to reduce risk of injury  - Consider OT/PT consult to assist with strengthening/mobility  Outcome: Progressing     Problem: Neurological Deficit  Goal: Neurological status is stable or improving  Description: Interventions:  - Monitor and assess patient's level of consciousness, motor function, sensory function, and level of assistance needed for ADLs  - Monitor and report changes from baseline  Collaborate with interdisciplinary team to initiate plan and implement interventions as ordered  - Provide and maintain a safe environment  - Consider seizure precautions  - Consider fall precautions  - Consider aspiration precautions  - Consider bleeding precautions  Outcome: Progressing     Problem: Activity Intolerance/Impaired Mobility  Goal: Mobility/activity is maintained at optimum level for patient  Description: Interventions:  - Assess and monitor patient  barriers to mobility and need for assistive/adaptive devices  - Assess patient's emotional response to limitations  - Collaborate with interdisciplinary team and initiate plans and interventions as ordered  - Encourage independent activity per ability   - Maintain proper body alignment  - Perform active/passive rom as tolerated/ordered  - Plan activities to conserve energy      Outcome: Progressing     Problem: GASTROINTESTINAL - ADULT  Goal: Maintains or returns to baseline bowel function  Description: INTERVENTIONS:  - Assess bowel function  - Encourage oral fluids to ensure adequate hydration  - Encourage mobilization and activity  - Consider nutritional services referral to assist patient with adequate nutrition and appropriate food choices  Outcome: Progressing     Problem: HEMATOLOGIC - ADULT  Goal: Maintains hematologic stability  Description: INTERVENTIONS  - Assess for signs and symptoms of bleeding or hemorrhage  - Monitor labs  - Administer supportive blood products/factors as ordered and appropriate  Outcome: Progressing

## 2020-09-17 NOTE — PROGRESS NOTES
Progress Note - Melvina Akins 80 y o  male MRN: [id number]    Unit/Bed#: [address] Encounter: [id number]        Subjective:   Patient reported with no bowel movements last night or this morning, he denies any abdominal pain, chest pain, or nausea  Tolerating liquid diet  Objective:     Vitals: Blood pressure 124/74, pulse 88, temperature 97 9 °F (36 6 °C), temperature source Oral, resp  rate 18, height 6' (1 829 m), weight 77 9 kg (171 lb 12 8 oz), SpO2 95 %  ,Body mass index is 23 3 kg/m²  Intake/Output Summary (Last 24 hours) at 9/17/2020 1100  Last data filed at 9/17/2020 0751  Gross per 24 hour   Intake 550 ml   Output 825 ml   Net -275 ml       Physical Exam:   General appearance: alert, appears stated age and cooperative  Extremely hard of hearing  Lungs: clear to auscultation bilaterally, no labored breathing/accessory muscle use  Heart: regular rate and rhythm, S1, S2 normal, no murmur, click, rub or gallop  Abdomen: soft, non-tender; bowel sounds normal; no masses,  no organomegaly  Extremities: no edema    Invasive Devices     Peripheral Intravenous Line            Peripheral IV 09/15/20 Left;Ventral (anterior) Forearm 2 days                Lab, Imaging and other studies: I have personally reviewed pertinent reports  No results displayed because visit has over 200 results  Results for Vickey Meeks (MRN [id number]) as of 9/17/2020 11:01   Ref   Range 9/16/2020 20:36 9/17/2020 05:32 9/17/2020 06:15 9/17/2020 07:08   POC Glucose Latest Ref Range: 65 - 140 mg/dl 124   132   Sodium Latest Ref Range: 136 - 145 mmol/L  142     Potassium Latest Ref Range: 3 5 - 5 3 mmol/L  3 7     Chloride Latest Ref Range: 100 - 108 mmol/L  109 (H)     CO2 Latest Ref Range: 21 - 32 mmol/L  27     Anion Gap Latest Ref Range: 4 - 13 mmol/L  6     BUN Latest Ref Range: 5 - 25 mg/dL  7     Creatinine Latest Ref Range: 0 60 - 1 30 mg/dL  1 17     Glucose, Random Latest Ref Range: 65 - 140 mg/dL  119 Calcium Latest Ref Range: 8 3 - 10 1 mg/dL  7 8 (L)     eGFR Latest Units: ml/min/1 73sq m  55     WBC Latest Ref Range: 4 31 - 10 16 Thousand/uL  9 22     Red Blood Cell Count Latest Ref Range: 3 88 - 5 62 Million/uL  2 58 (L)     Hemoglobin Latest Ref Range: 12 0 - 17 0 g/dL  7 9 (L)     HCT Latest Ref Range: 36 5 - 49 3 %  25 3 (L)     MCV Latest Ref Range: 82 - 98 fL  98     MCH Latest Ref Range: 26 8 - 34 3 pg  30 6     MCHC Latest Ref Range: 31 4 - 37 4 g/dL  31 2 (L)     RDW Latest Ref Range: 11 6 - 15 1 %  16 0 (H)     Platelet Count Latest Ref Range: 149 - 390 Thousands/uL  202     MPV Latest Ref Range: 8 9 - 12 7 fL  10 5     Crossmatch Unknown   Compatible    Unit ABO Unknown   A    Unit RH Unknown   POS    Unit Number Unknown   [id number]    Unit Dispense Status Unknown   Presumed Trans      Assessment/Plan:    1  GI bleed which appears to have been secondary to lower GI source with actively bleeding diverticulum in the descending colon noted on patient's 1st colonoscopy during this admission, for which he underwent Endoclip placement; colonoscopy was repeated 2 days ago due to concern for some dark-colored stools and downtrending hemoglobin but no active bleeding was seen  EGD that day also showed no GI bleeding      Since yesterday, patient has been hemodynamically stable, hemoglobin over the last 2 days improved to only 7 9 from 6 7 after transfusion of 2 units packed red blood cells, reported with 1 dark colored bowel movement yesterday with none overnight or this morning    - continue to monitor hemoglobin closely, will recheck this afternoon  - will cautiously advance to nonulcerogenic low residue diet  - continue Protonix  - monitor stool output closely  - Plavix remains on hold at this time  - consider outpatient capsule study if patient noted with persistent dark colored stools and gradual down trend of hemoglobin  - if patient shows significant gross lower GI bleeding with sapna red blood, recommend IR consultation for angiography and possible embolization  - I discussed patient's case with internal medicine

## 2020-09-17 NOTE — ASSESSMENT & PLAN NOTE
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 which was cauterized with APC  Patient continues to have intermittent bloody bowel movements  Patient had 1 bowel movement last night which was bloody  Continue IV Protonix  Patient underwent repeat EGD and colonoscopy on September 15, 2020 which showed no active bleeding with only some dark liquid stool throughout the colon  Patient has no further active bleeding  There is slight drop in hemoglobin  Patient is tolerating full liquid diet which will be advanced to noncardiogenic low residue diet  The patient will be monitored over the next 24 hours worsening bleeding  He had no further bleeding and hemoglobin continues to remain stable possible restarting Plavix in marbella Prater

## 2020-09-18 VITALS
OXYGEN SATURATION: 95 % | WEIGHT: 170.64 LBS | SYSTOLIC BLOOD PRESSURE: 115 MMHG | BODY MASS INDEX: 23.11 KG/M2 | DIASTOLIC BLOOD PRESSURE: 55 MMHG | RESPIRATION RATE: 16 BRPM | TEMPERATURE: 97.9 F | HEART RATE: 79 BPM | HEIGHT: 72 IN

## 2020-09-18 LAB
ERYTHROCYTE [DISTWIDTH] IN BLOOD BY AUTOMATED COUNT: 15.9 % (ref 11.6–15.1)
GLUCOSE SERPL-MCNC: 116 MG/DL (ref 65–140)
GLUCOSE SERPL-MCNC: 231 MG/DL (ref 65–140)
HCT VFR BLD AUTO: 26.3 % (ref 36.5–49.3)
HGB BLD-MCNC: 8.2 G/DL (ref 12–17)
MCH RBC QN AUTO: 30.7 PG (ref 26.8–34.3)
MCHC RBC AUTO-ENTMCNC: 31.2 G/DL (ref 31.4–37.4)
MCV RBC AUTO: 99 FL (ref 82–98)
PLATELET # BLD AUTO: 222 THOUSANDS/UL (ref 149–390)
PMV BLD AUTO: 10.3 FL (ref 8.9–12.7)
RBC # BLD AUTO: 2.67 MILLION/UL (ref 3.88–5.62)
WBC # BLD AUTO: 11.27 THOUSAND/UL (ref 4.31–10.16)

## 2020-09-18 PROCEDURE — 85027 COMPLETE CBC AUTOMATED: CPT | Performed by: INTERNAL MEDICINE

## 2020-09-18 PROCEDURE — 99232 SBSQ HOSP IP/OBS MODERATE 35: CPT | Performed by: PHYSICIAN ASSISTANT

## 2020-09-18 PROCEDURE — 99239 HOSP IP/OBS DSCHRG MGMT >30: CPT | Performed by: INTERNAL MEDICINE

## 2020-09-18 PROCEDURE — 82948 REAGENT STRIP/BLOOD GLUCOSE: CPT

## 2020-09-18 PROCEDURE — C9113 INJ PANTOPRAZOLE SODIUM, VIA: HCPCS | Performed by: INTERNAL MEDICINE

## 2020-09-18 RX ORDER — CLOPIDOGREL BISULFATE 75 MG/1
75 TABLET ORAL
Refills: 0
Start: 2020-09-21

## 2020-09-18 RX ADMIN — FERROUS SULFATE TAB 325 MG (65 MG ELEMENTAL FE) 325 MG: 325 (65 FE) TAB at 08:27

## 2020-09-18 RX ADMIN — STANDARDIZED SENNA CONCENTRATE 8.6 MG: 8.6 TABLET ORAL at 08:28

## 2020-09-18 RX ADMIN — PANTOPRAZOLE SODIUM 40 MG: 40 INJECTION, POWDER, FOR SOLUTION INTRAVENOUS at 08:28

## 2020-09-18 RX ADMIN — VITAMIN D, TAB 1000IU (100/BT) 2000 UNITS: 25 TAB at 05:04

## 2020-09-18 RX ADMIN — Medication 400 MCG: at 05:04

## 2020-09-18 RX ADMIN — INSULIN LISPRO 2 UNITS: 100 INJECTION, SOLUTION INTRAVENOUS; SUBCUTANEOUS at 12:28

## 2020-09-18 RX ADMIN — FERROUS SULFATE TAB 325 MG (65 MG ELEMENTAL FE) 325 MG: 325 (65 FE) TAB at 12:48

## 2020-09-18 RX ADMIN — LOSARTAN POTASSIUM 25 MG: 25 TABLET, FILM COATED ORAL at 08:28

## 2020-09-18 NOTE — [place] UNIVERSAL TRANSFER FORM
[place] UNIVERSAL TRANSFER FORM  (ALL ITEMS MUST BE COMPLETED)    1  TRANSFER FROM: Darlyn Lui TO:[facility]    2  DATE OF TRANSFER: 9/18/2020                        TIME OF TRANSFER: 1300    3  PATIENT NAME: TENA Saravia      YOB: 1930                             GENDER: male    4  LANGUAGE:   English    5  PHYSICIAN NAME:  Aleena Canela MD                   PHONE: [phone]    6  CODE STATUS: Level 3 - DNAR and DNI        Out of Hospital DNR Attached: No    7  :                                      :  Extended Emergency Contact Information  Primary Emergency ContactBillie Castillo  Mobile Phone: [phone]  Relation: Daughter In-Law           Health Care Representative/Proxy:  No           Legal Guardian:  No             NAME OF:           HEALTH CARE REPRESENTATIVE/PROXY:                                         OR           LEGAL GUARDIAN, IF NOT :                                               PHONE:  (Day)           (Night)                        (Cell)    8  REASON FOR TRANSFER: (Must include brief medical history and recent changes in physical function or cognition ) Rehab            V/S: /55 (BP Location: Right arm)   Pulse 79   Temp 97 9 °F (36 6 °C) (Oral)   Resp 16   Ht 6' (1 829 m)   Wt 77 4 kg (170 lb 10 2 oz)   SpO2 95%   BMI 23 14 kg/m²           PAIN: None    9  PRIMARY DIAGNOSIS: Rectal bleeding      Secondary Diagnosis:         Pacemaker: No      Internal Defib: No          Mental Health Diagnosis (if Applicable):    10  RESTRAINTS: No     11  RESPIRATORY NEEDS: None    12  ISOLATION/PRECAUTION: None    13  ALLERGY: Other    14  SENSORY:       Vision Glasses    15  SKIN CONDITION: Yes:  OtherSkin tear R forearm    16  DIET: Regular    17  IV ACCESS: None    18   PERSONAL ITEMS SENT WITH PATIENT: Glasses, Hearing Aid Left and Right, Dentures Upper Full and Lower Full and Othercharger for hearing aids    19  ATTACHED DOCUMENTS: MUST ATTACH CURRENT MEDICATION INFORMATION Face Sheet, MAR, Medication Reconciliation, Diagnostic Studies, Labs, Code Status, Discharge Summary and HX/PE    20  AT RISK ALERTS:Falls        HARM TO: N/A    21  WEIGHT BEARING STATUS:         Left Leg: None        Right Leg: None    22  MENTAL STATUS:Alert, Oriented and Forgetful    23  FUNCTION:        Walk: With Help        Transfer: With Help        Toilet: With Help        Feed: Self    24  IMMUNIZATIONS/SCREENING:     There is no immunization history on file for this patient  25  BOWEL: Continent and Date Last BM9/18/20    32  BLADDER: Continent    27   SENDING FACILITY CONTACT: [facility]                   Title: RN        Unit: 2s        Phone:           [phone] (if known):        Title:        Unit:         Phone:         FORM PREFILLED BY (if applicable)       Title:       Unit:        Phone:         Jeramy Boyle RN      Title: RN      Phone: [phone]

## 2020-09-18 NOTE — ASSESSMENT & PLAN NOTE
Lab Results   Component Value Date    HGBA1C 5 9 (H) 09/10/2020       Recent Labs     09/17/20  1054 09/17/20  1545 09/17/20 2036 09/18/20  0725   POCGLU 248* 124 166* 116     Resume metformin upon discharge  (P) [phone]

## 2020-09-18 NOTE — PROGRESS NOTES
Progress Note - Diamond Engel 80 y o  male MRN: [id number]    Unit/Bed#: 2 [address] Encounter: [id number]    Assessment and Plan:   Principal Problem:    Rectal bleeding  Active Problems:    Left-sided weakness    Essential hypertension    Dyslipidemia    Type 2 diabetes mellitus, without long-term current use of insulin (HCC)    Carotid disease, bilateral (HCC)    Acute blood loss anemia    Aortic stenosis    Dementia ([address] )    #1  GI bleed secondary to diverticular bleeding: s/p two EGD/colonoscopies this admission  1st showed actively bleeding diverticulum in the descending colon noted on patient's colonoscopy for which he underwent Endoclip placement; colonoscopy was repeated 3 days ago due to concern for some dark-colored stools and downtrending hemoglobin but no active bleeding was seen  EGD that day also showed no GI bleeding  Hgb is stable  Had a brown BM last night  No abdominal pain, N/V  -continue diet as tolerated  -hold Plavix for 2 more days  Can resume Sunday/Monday  -monitor hgb and for recurrent bleeding    ----------------------------------------------------------------------------------------------------------------    Subjective:     Doing well, had a brown BM last night per nurse, no bleeding  No abdominal pain, N/V    Objective:     Vitals: Blood pressure 115/55, pulse 79, temperature 97 9 °F (36 6 °C), resp  rate 18, height 6' (1 829 m), weight 77 4 kg (170 lb 10 2 oz), SpO2 95 %  ,Body mass index is 23 14 kg/m²        Intake/Output Summary (Last 24 hours) at 9/18/2020 0917  Last data filed at 9/18/2020 0601  Gross per 24 hour   Intake 120 ml   Output 1400 ml   Net -1280 ml       Physical Exam:     General Appearance: Alert, appears stated age and cooperative; elderly male  Lungs: Clear to auscultation bilaterally, no rales or rhonchi, no labored breathing/accessory muscle use  Heart: Regular rate and rhythm, S1, S2 normal, no click, rub or gallop; murmur present  Abdomen: Soft, non-tender, non-distended; bowel sounds normal; no masses or no organomegaly  Extremities: No cyanosis, clubbing, or edema    Invasive Devices     Peripheral Intravenous Line            Peripheral IV 09/17/20 Right;Upper Arm less than 1 day                Lab Results:  Results from last 7 days   Lab Units 09/18/20  0508  09/12/20  0534   WBC Thousand/uL 11 27*   < > 11 44*   HEMOGLOBIN g/dL 8 2*   < > 7 3*   HEMATOCRIT % 26 3*   < > 22 2*   PLATELETS Thousands/uL 222   < > 142*   NEUTROS PCT %  --   --  77*   LYMPHS PCT %  --   --  13*   MONOS PCT %  --   --  8   EOS PCT %  --   --  1    < > = values in this interval not displayed  Results from last 7 days   Lab Units 09/17/20  0532   POTASSIUM mmol/L 3 7   CHLORIDE mmol/L 109*   CO2 mmol/L 27   BUN mg/dL 7   CREATININE mg/dL 1 17   CALCIUM mg/dL 7 8*     Invalid input(s): BILI            Imaging Studies: I have personally reviewed pertinent imaging studies  Mri Brain Wo Contrast    Result Date: 9/10/2020  Impression: White matter changes suggestive of chronic microangiopathy  No acute intracranial pathology  Workstation performed: [id number]     Ct Stroke Alert Brain    Result Date: 9/9/2020  Impression: 1  No acute intracranial CT abnormality  2   Chronic white matter microangiopathy  Findings were directly discussed with opendorse on 9/9/2020 7:50 AM  Workstation performed: [id number]     Cta Stroke Alert (head/neck)    Result Date: 9/9/2020  Impression: 1  Patent major vasculature of [place] without critical stenosis  Atherosclerotic 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

## 2020-09-18 NOTE — ASSESSMENT & PLAN NOTE
Patient presented with left-sided weakness along with left facial droop, status post fall at home   CT of head revealed chronic changes   MRI negative for acute stroke  Symptoms likely TIA   Discontinued full-dose ASA as per Neurology  Patient has been on Plavix which has been held again due to persistent GI bleed and worsening hemoglobin  Continue statin  Restart Plavix on 09/21   He was on neuro checks    Lipid panel - LDL 26, HgA1C 5 9   CTA head/neck showed 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   Echo with bubble study showed EF of 55-60% with grade 1 diastolic dysfunction  No ASD or PFO noted   Neuro eval appreciated

## 2020-09-18 NOTE — DISCHARGE SUMMARY
Discharge- Ángel Marquez 12/3/1930, 80 y o  male MRN: [id number]    Unit/Bed#: 2 [place] [address] Encounter: [id number]    Primary Care Provider: Lluvia Mullins DO   Date and time admitted to hospital: 9/9/2020  7:28 AM        * Rectal bleeding  Assessment & Plan  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 which was cauterized with APC  Patient continues to have intermittent bloody bowel movements  Patient had 1 bowel movement last night which was bloody  Continue IV Protonix  Patient underwent repeat EGD and colonoscopy on September 15, 2020 which showed no active bleeding with only some dark liquid stool throughout the colon  Patient has no further active bleeding  Hemoglobin continues to remain stable  Patient is tolerating low residue non ulcerogenic diet well  Patient to be discharged with follow-up hemoglobin in 3 days  Outpatient follow-up with GI    Acute blood loss anemia  Assessment & Plan  Patient with history of iron deficiency anemia  He gets IV iron infusions once per month by his hematologist  As per ICU note, he received a total of 3 units of PRBC transfusion during ICU stay  Patient received 1 unit of PRBC transfusion on September 15th and September 16  Hemoglobin continues to remain stable      Results from last 7 days   Lab Units 09/18/20  0508 09/17/20  1202 09/17/20  0532 09/16/20  1705 09/16/20  0540 09/15/20  0522 09/14/20 2026 09/14/20  1047 09/14/20  0535 09/13/20  2477 09/12/20  0534   HEMOGLOBIN g/dL 8 2* 8 7* 7 9* 8 4* 7 1* 6 7* 7 3* 7 9* 7 7* 7 5*   < > 7 3*   HEMATOCRIT % 26 3* 27 1* 25 3* 25 7* 22 6* 21 2* 23 4* 24 5* 24 1* 23 5*   < > 22 2*   MCV fL 99*  --  98  --  97  --   --   --   --  96  --  93    < > = values in this interval not displayed  Left-sided weakness  Assessment & Plan  Patient presented with left-sided weakness along with left facial droop, status post fall at home   CT of head revealed chronic changes   MRI negative for acute stroke  Symptoms likely TIA   Discontinued full-dose ASA as per Neurology  Patient has been on Plavix which has been held again due to persistent GI bleed and worsening hemoglobin  Continue statin  Restart Plavix on 09/21   He was on neuro checks    Lipid panel - LDL 26, HgA1C 5 9   CTA head/neck showed 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   Echo with bubble study showed EF of 55-60% with grade 1 diastolic dysfunction  No ASD or PFO noted   Neuro eval appreciated  Type 2 diabetes mellitus, without long-term current use of insulin [facility])  Assessment & Plan  Lab Results   Component Value Date    HGBA1C 5 9 (H) 09/10/2020       Recent Labs     09/17/20  1054 09/17/20  1545 09/17/20  2036 09/18/20  0725   POCGLU 248* 124 166* 116     Resume metformin upon discharge  (P) 105 [id number]    Dementia (HCC)  Assessment & Plan  Continue Aricept  Aortic stenosis  Assessment & Plan  Moderate to severe aortic valve stenosis noted  Carotid disease, bilateral [facility])  Assessment & Plan  Status post bilateral carotid artery stent placement  Continue Lipitor  Patient is on Plavix which can be restarted in couple of days    Dyslipidemia  Assessment & Plan  Lipitor was increased to 80 mg daily but now on 40 mg daily      Essential hypertension  Assessment & Plan  Initially antihypertensives held to allow for permissive hypertension and then due soft blood pressures  Resume Cozaar upon discharge  Blood pressure has been  is stable        Discharging Physician / Practitioner: Jason Santillan MD  PCP: Lus Holstein, DO  Admission Date:   Admission Orders (From admission, onward)     Ordered        09/10/20 1522  Inpatient Admission  Once         09/09/20 0900  Place in Observation  Once                   Discharge Date: 09/18/20    Resolved Problems  Date Reviewed: 9/18/2020    None          Consultations During Hospital Stay:  · Gastroenterology    Procedures Performed:   · EGD on 09/10 showed 1 centimeter hiatal hernia open Schatzki's ring at GE junction, large AVM in stomach which was treated with APC  colonoscopy showed lot of bloody liquid mixed with stool throughout the cecum with Few diverticula in the right colon and multiple diverticula in the left colon  Active bleeding in the descending colon at about 50 centimeters from the anal verge which was treated with epinephrine and hemoclips  · Patient underwent repeat EGD and colonoscopy on 09/15 due to persistent bleeding which showed no evidence of active bleeding       Outpatient Tests Requested:  · H&H in 3 days    Complications:  None    Reason for Admission:     Hospital Course:     Jossy Lau is a 80 y o  male patient who originally presented to the hospital on 9/9/2020 due to left-sided weakness with some slurred speech  Patient was seen in consult Neurology and patient was placed on dual antiplatelet therapy along with aspirin and Lipitor  Patient had MRI of the brain which was negative for acute stroke  Later patient developed sapna rectal bleeding  Aspirin and Lovenox were discontinued  Patient continued to have multiple bloody bowel movements with sapna blood and rapid response was called due to hypotension  Patient received 3 units of PRBC transfusion during the hospital stay    Patient underwent EGD and colonoscopy by GI which showed gastric AVM treated with APC and bleeding diverticulum which was clipped and treated with epinephrine  Later patient was transferred to the floor and was restarted back on Plavix  Patient continued to have bleeding and required 2 more units while on the floor  Patient later underwent repeat EGD and colonoscopy which showed no evidence of active bleeding  Patient's hemoglobin continued remained stable and patient was tolerating diet  Patient remained in stable condition be discharged back to the nursing home to restart Plavix in 2-3 days  Please see above list of diagnoses and related plan for additional information  Condition at Discharge: stable     Discharge Day Visit / Exam:     Subjective:  Patient is extremely hard of hearing  As per RN patient had 1 bowel movement yesterday with brown stools without any bleeding  Patient denies any abdominal pain nausea or vomiting  Vitals: Blood Pressure: 115/55 (09/18/20 0723)  Pulse: 79 (09/18/20 0723)  Temperature: 97 9 °F (36 6 °C) (09/18/20 0723)  Temp Source: Oral (09/18/20 0723)  Respirations: 16 (09/18/20 0723)  Height: 6' (182 9 cm) (09/09/20 1300)  Weight - Scale: 77 4 kg (170 lb 10 2 oz) (09/18/20 0600)  SpO2: 95 % (09/18/20 0723)  Exam:   Physical Exam  Constitutional:       Appearance: Normal appearance  HENT:      Head: Normocephalic and atraumatic  Eyes:      Extraocular Movements: Extraocular movements intact  Pupils: Pupils are equal, round, and reactive to light  Neck:      Musculoskeletal: Normal range of motion and neck supple  Cardiovascular:      Rate and Rhythm: Normal rate and regular rhythm  Heart sounds: Murmur present  No gallop  Pulmonary:      Effort: Pulmonary effort is normal       Breath sounds: Normal breath sounds  Abdominal:      General: Bowel sounds are normal       Palpations: Abdomen is soft  Tenderness: There is no abdominal tenderness  Musculoskeletal: Normal range of motion  General: No swelling or deformity     Skin:     General: Skin is warm and dry  Neurological:      General: No focal deficit present  Mental Status: He is alert  Discussion with Family:  Left a message for daughter-in-law En Button    Discharge instructions/Information to patient and family:   See after visit summary for information provided to patient and family  Provisions for Follow-Up Care:  See after visit summary for information related to follow-up care and any pertinent home health orders  Disposition:     [facility] [place] [facility]      Planned Readmission: No     Discharge Statement:  I spent 35 minutes discharging the patient  This time was spent on the day of discharge  I had direct contact with the patient on the day of discharge  Greater than 50% of the total time was spent examining patient, answering all patient questions, arranging and discussing plan of care with patient as well as directly providing post-discharge instructions  Additional time then spent on discharge activities  Discharge Medications:  See after visit summary for reconciled discharge medications provided to patient and family        ** Please Note: This note has been constructed using a voice recognition system **

## 2020-09-18 NOTE — PLAN OF CARE
Problem: Potential for Falls  Goal: Patient will remain free of falls  Description: INTERVENTIONS:  - Assess patient frequently for physical needs  -  Identify cognitive and physical deficits and behaviors that affect risk of falls  -  [place] fall precautions as indicated by assessment   - Educate patient/family on patient safety including physical limitations  - Instruct patient to call for assistance with activity based on assessment  - Modify environment to reduce risk of injury  - Consider OT/PT consult to assist with strengthening/mobility  9/18/2020 1806 by Minh Guthrie RN  Outcome: Adequate for Discharge  9/18/2020 1037 by Minh Guthrie RN  Outcome: Progressing  Note: Pt remains free from falls  Call bell within reach  Bed locked and in lowest position  Bed alarm activated  Problem: Neurological Deficit  Goal: Neurological status is stable or improving  Description: Interventions:  - Monitor and assess patient's level of consciousness, motor function, sensory function, and level of assistance needed for ADLs  - Monitor and report changes from baseline  Collaborate with interdisciplinary team to initiate plan and implement interventions as ordered  - Provide and maintain a safe environment  - Consider seizure precautions  - Consider fall precautions  - Consider aspiration precautions  - Consider bleeding precautions  9/18/2020 1806 by Minh Guthrie RN  Outcome: Adequate for Discharge  9/18/2020 [facility] by Minh Guthrie RN  Outcome: Progressing     Problem: Activity Intolerance/Impaired Mobility  Goal: Mobility/activity is maintained at optimum level for patient  Description: Interventions:  - Assess and monitor patient  barriers to mobility and need for assistive/adaptive devices  - Assess patient's emotional response to limitations  - Collaborate with interdisciplinary team and initiate plans and interventions as ordered    - Encourage independent activity per ability   - Maintain proper body alignment  - Perform active/passive rom as tolerated/ordered  - Plan activities to conserve energy      9/18/2020 1806 by Barbara Alonso RN  Outcome: Adequate for Discharge  9/18/2020 1037 by Barbara Alonso RN  Outcome: Progressing     Problem: GASTROINTESTINAL - ADULT  Goal: Maintains or returns to baseline bowel function  Description: INTERVENTIONS:  - Assess bowel function  - Encourage oral fluids to ensure adequate hydration  - Encourage mobilization and activity  - Consider nutritional services referral to assist patient with adequate nutrition and appropriate food choices  9/18/2020 1806 by Barbara Alonso RN  Outcome: Adequate for Discharge  9/18/2020 1037 by Barbara Alonso RN  Outcome: Progressing     Problem: HEMATOLOGIC - ADULT  Goal: Maintains hematologic stability  Description: INTERVENTIONS  - Assess for signs and symptoms of bleeding or hemorrhage  - Monitor labs  - Administer supportive blood products/factors as ordered and appropriate  9/18/2020 1806 by Barbara Alonso RN  Outcome: Adequate for Discharge  9/18/2020 1037 by Barbara Alonso RN  Outcome: Progressing

## 2020-09-18 NOTE — ASSESSMENT & PLAN NOTE
Patient with history of iron deficiency anemia  He gets IV iron infusions once per month by his hematologist  As per ICU note, he received a total of 3 units of PRBC transfusion during ICU stay  Patient received 1 unit of PRBC transfusion on September 15th and September 16  Hemoglobin continues to remain stable    Results from last 7 days   Lab Units 09/18/20  0508 09/17/20  1202 09/17/20  0532 09/16/20  1705 09/16/20  0540 09/15/20  0522 09/14/20  2026 09/14/20  1047 09/14/20  0535 09/13/20  0556  09/12/20  0534   HEMOGLOBIN g/dL 8 2* 8 7* 7 9* 8 4* 7 1* 6 7* 7 3* 7 9* 7 7* 7 5*   < > 7 3*   HEMATOCRIT % 26 3* 27 1* 25 3* 25 7* 22 6* 21 2* 23 4* 24 5* 24 1* 23 5*   < > 22 2*   MCV fL 99*  --  98  --  97  --   --   --   --  96  --  93    < > = values in this interval not displayed

## 2020-09-18 NOTE — PLAN OF CARE
Problem: Potential for Falls  Goal: Patient will remain free of falls  Description: INTERVENTIONS:  - Assess patient frequently for physical needs  -  Identify cognitive and physical deficits and behaviors that affect risk of falls  -  [place] fall precautions as indicated by assessment   - Educate patient/family on patient safety including physical limitations  - Instruct patient to call for assistance with activity based on assessment  - Modify environment to reduce risk of injury  - Consider OT/PT consult to assist with strengthening/mobility  Outcome: Progressing  Note: Pt remains free from falls  Call bell within reach  Bed locked and in lowest position  Bed alarm activated  Problem: Neurological Deficit  Goal: Neurological status is stable or improving  Description: Interventions:  - Monitor and assess patient's level of consciousness, motor function, sensory function, and level of assistance needed for ADLs  - Monitor and report changes from baseline  Collaborate with interdisciplinary team to initiate plan and implement interventions as ordered  - Provide and maintain a safe environment  - Consider seizure precautions  - Consider fall precautions  - Consider aspiration precautions  - Consider bleeding precautions  Outcome: Progressing     Problem: Activity Intolerance/Impaired Mobility  Goal: Mobility/activity is maintained at optimum level for patient  Description: Interventions:  - Assess and monitor patient  barriers to mobility and need for assistive/adaptive devices  - Assess patient's emotional response to limitations  - Collaborate with interdisciplinary team and initiate plans and interventions as ordered  - Encourage independent activity per ability   - Maintain proper body alignment  - Perform active/passive rom as tolerated/ordered  - Plan activities to conserve energy      Outcome: Progressing     Problem: GASTROINTESTINAL - ADULT  Goal: Maintains or returns to baseline bowel function  Description: INTERVENTIONS:  - Assess bowel function  - Encourage oral fluids to ensure adequate hydration  - Encourage mobilization and activity  - Consider nutritional services referral to assist patient with adequate nutrition and appropriate food choices  Outcome: Progressing     Problem: HEMATOLOGIC - ADULT  Goal: Maintains hematologic stability  Description: INTERVENTIONS  - Assess for signs and symptoms of bleeding or hemorrhage  - Monitor labs  - Administer supportive blood products/factors as ordered and appropriate  Outcome: Progressing

## 2020-09-18 NOTE — NURSING NOTE
Discharge instructions given to receiving facility  IV access removed    All personal belonging taken with transport team

## 2020-09-18 NOTE — ASSESSMENT & PLAN NOTE
Initially antihypertensives held to allow for permissive hypertension and then due soft blood pressures  Resume Cozaar upon discharge  Blood pressure has been  is stable

## 2020-09-18 NOTE — ASSESSMENT & PLAN NOTE
Status post bilateral carotid artery stent placement  Continue Lipitor    Patient is on Plavix which can be restarted in couple of days

## 2020-09-18 NOTE — ASSESSMENT & PLAN NOTE
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

## 2020-09-20 NOTE — CASE MANAGEMENT
Patient written for d/c today  CM assistant Martine Champion contacted [facility] and s/w Raymond Pickens to request W/C transport  CM assistant received message from Harshad Harry at [facility] stating patient is set up for a 1pm w/c transport via [facility] to [place] today  Nurse and facility made aware   CM assistant s/w FLORA Conner via telephone to make her aware

## 2020-10-01 ENCOUNTER — TELEPHONE (OUTPATIENT)
Dept: GASTROENTEROLOGY | Facility: AMBULARY SURGERY CENTER | Age: 85
End: 2020-10-01

## 2020-10-02 ENCOUNTER — APPOINTMENT (EMERGENCY)
Dept: RADIOLOGY | Facility: HOSPITAL | Age: 85
DRG: 683 | End: 2020-10-02
Payer: MEDICARE

## 2020-10-02 ENCOUNTER — HOSPITAL ENCOUNTER (INPATIENT)
Facility: HOSPITAL | Age: 85
LOS: 4 days | Discharge: NURSING, REHAB OR LONG-TERM CARE | DRG: 683 | End: 2020-10-06
Attending: EMERGENCY MEDICINE | Admitting: INTERNAL MEDICINE
Payer: MEDICARE

## 2020-10-02 DIAGNOSIS — D72.829 LEUKOCYTOSIS: Primary | ICD-10-CM

## 2020-10-02 DIAGNOSIS — R79.89 ELEVATED LACTIC ACID LEVEL: ICD-10-CM

## 2020-10-02 DIAGNOSIS — R65.10 SIRS (SYSTEMIC INFLAMMATORY RESPONSE SYNDROME) (HCC): ICD-10-CM

## 2020-10-02 DIAGNOSIS — I10 ESSENTIAL HYPERTENSION: ICD-10-CM

## 2020-10-02 PROBLEM — N17.9 AKI (ACUTE KIDNEY INJURY) (HCC): Status: ACTIVE | Noted: 2020-10-02

## 2020-10-02 LAB
ALBUMIN SERPL BCP-MCNC: 2.7 G/DL (ref 3.5–5)
ALP SERPL-CCNC: 80 U/L (ref 46–116)
ALT SERPL W P-5'-P-CCNC: 9 U/L (ref 12–78)
ANION GAP SERPL CALCULATED.3IONS-SCNC: 12 MMOL/L (ref 4–13)
APTT PPP: 26 SECONDS (ref 23–37)
AST SERPL W P-5'-P-CCNC: 18 U/L (ref 5–45)
BACTERIA UR QL AUTO: ABNORMAL /HPF
BASOPHILS # BLD AUTO: 0.05 THOUSANDS/ΜL (ref 0–0.1)
BASOPHILS NFR BLD AUTO: 0 % (ref 0–1)
BILIRUB SERPL-MCNC: 0.4 MG/DL (ref 0.2–1)
BILIRUB UR QL STRIP: NEGATIVE
BUN SERPL-MCNC: 28 MG/DL (ref 5–25)
CALCIUM ALBUM COR SERPL-MCNC: 9.1 MG/DL (ref 8.3–10.1)
CALCIUM SERPL-MCNC: 8.1 MG/DL (ref 8.3–10.1)
CHLORIDE SERPL-SCNC: 106 MMOL/L (ref 100–108)
CLARITY UR: CLEAR
CO2 SERPL-SCNC: 23 MMOL/L (ref 21–32)
COLOR UR: ABNORMAL
CREAT SERPL-MCNC: 1.9 MG/DL (ref 0.6–1.3)
EOSINOPHIL # BLD AUTO: 0.07 THOUSAND/ΜL (ref 0–0.61)
EOSINOPHIL NFR BLD AUTO: 0 % (ref 0–6)
ERYTHROCYTE [DISTWIDTH] IN BLOOD BY AUTOMATED COUNT: 14.4 % (ref 11.6–15.1)
GFR SERPL CREATININE-BSD FRML MDRD: 31 ML/MIN/1.73SQ M
GLUCOSE SERPL-MCNC: 103 MG/DL (ref 65–140)
GLUCOSE SERPL-MCNC: 147 MG/DL (ref 65–140)
GLUCOSE UR STRIP-MCNC: NEGATIVE MG/DL
HCT VFR BLD AUTO: 28.9 % (ref 36.5–49.3)
HGB BLD-MCNC: 9.2 G/DL (ref 12–17)
HGB UR QL STRIP.AUTO: ABNORMAL
HYALINE CASTS #/AREA URNS LPF: ABNORMAL /LPF
IMM GRANULOCYTES # BLD AUTO: 0.16 THOUSAND/UL (ref 0–0.2)
IMM GRANULOCYTES NFR BLD AUTO: 1 % (ref 0–2)
INR PPP: 1.1 (ref 0.84–1.19)
KETONES UR STRIP-MCNC: NEGATIVE MG/DL
LACTATE SERPL-SCNC: 2.3 MMOL/L (ref 0.5–2)
LACTATE SERPL-SCNC: 2.8 MMOL/L (ref 0.5–2)
LACTATE SERPL-SCNC: 3 MMOL/L (ref 0.5–2)
LEUKOCYTE ESTERASE UR QL STRIP: NEGATIVE
LYMPHOCYTES # BLD AUTO: 1.36 THOUSANDS/ΜL (ref 0.6–4.47)
LYMPHOCYTES NFR BLD AUTO: 6 % (ref 14–44)
MCH RBC QN AUTO: 31.5 PG (ref 26.8–34.3)
MCHC RBC AUTO-ENTMCNC: 31.8 G/DL (ref 31.4–37.4)
MCV RBC AUTO: 99 FL (ref 82–98)
MONOCYTES # BLD AUTO: 1.14 THOUSAND/ΜL (ref 0.17–1.22)
MONOCYTES NFR BLD AUTO: 5 % (ref 4–12)
MUCOUS THREADS UR QL AUTO: ABNORMAL
NEUTROPHILS # BLD AUTO: 19.64 THOUSANDS/ΜL (ref 1.85–7.62)
NEUTS SEG NFR BLD AUTO: 88 % (ref 43–75)
NITRITE UR QL STRIP: NEGATIVE
NON-SQ EPI CELLS URNS QL MICRO: ABNORMAL /HPF
NRBC BLD AUTO-RTO: 0 /100 WBCS
PH UR STRIP.AUTO: 5 [PH]
PLATELET # BLD AUTO: 268 THOUSANDS/UL (ref 149–390)
PMV BLD AUTO: 10.1 FL (ref 8.9–12.7)
POTASSIUM SERPL-SCNC: 4.2 MMOL/L (ref 3.5–5.3)
PROCALCITONIN SERPL-MCNC: 0.08 NG/ML
PROT SERPL-MCNC: 6.5 G/DL (ref 6.4–8.2)
PROT UR STRIP-MCNC: NEGATIVE MG/DL
PROTHROMBIN TIME: 14.1 SECONDS (ref 11.6–14.5)
RBC # BLD AUTO: 2.92 MILLION/UL (ref 3.88–5.62)
RBC #/AREA URNS AUTO: ABNORMAL /HPF
SARS-COV-2 RNA RESP QL NAA+PROBE: NEGATIVE
SODIUM SERPL-SCNC: 141 MMOL/L (ref 136–145)
SP GR UR STRIP.AUTO: 1.01 (ref 1–1.03)
URATE CRY URNS QL MICRO: ABNORMAL /HPF
UROBILINOGEN UR QL STRIP.AUTO: 0.2 E.U./DL
WBC # BLD AUTO: 22.42 THOUSAND/UL (ref 4.31–10.16)
WBC #/AREA URNS AUTO: ABNORMAL /HPF

## 2020-10-02 PROCEDURE — 99285 EMERGENCY DEPT VISIT HI MDM: CPT

## 2020-10-02 PROCEDURE — 87040 BLOOD CULTURE FOR BACTERIA: CPT | Performed by: EMERGENCY MEDICINE

## 2020-10-02 PROCEDURE — 99223 1ST HOSP IP/OBS HIGH 75: CPT | Performed by: NURSE PRACTITIONER

## 2020-10-02 PROCEDURE — 87635 SARS-COV-2 COVID-19 AMP PRB: CPT | Performed by: EMERGENCY MEDICINE

## 2020-10-02 PROCEDURE — 83605 ASSAY OF LACTIC ACID: CPT | Performed by: NURSE PRACTITIONER

## 2020-10-02 PROCEDURE — 80053 COMPREHEN METABOLIC PANEL: CPT | Performed by: EMERGENCY MEDICINE

## 2020-10-02 PROCEDURE — 71260 CT THORAX DX C+: CPT

## 2020-10-02 PROCEDURE — 81001 URINALYSIS AUTO W/SCOPE: CPT | Performed by: EMERGENCY MEDICINE

## 2020-10-02 PROCEDURE — G1004 CDSM NDSC: HCPCS

## 2020-10-02 PROCEDURE — 96361 HYDRATE IV INFUSION ADD-ON: CPT

## 2020-10-02 PROCEDURE — 84145 PROCALCITONIN (PCT): CPT | Performed by: EMERGENCY MEDICINE

## 2020-10-02 PROCEDURE — 99285 EMERGENCY DEPT VISIT HI MDM: CPT | Performed by: EMERGENCY MEDICINE

## 2020-10-02 PROCEDURE — 96365 THER/PROPH/DIAG IV INF INIT: CPT

## 2020-10-02 PROCEDURE — 74177 CT ABD & PELVIS W/CONTRAST: CPT

## 2020-10-02 PROCEDURE — 85025 COMPLETE CBC W/AUTO DIFF WBC: CPT | Performed by: EMERGENCY MEDICINE

## 2020-10-02 PROCEDURE — 83605 ASSAY OF LACTIC ACID: CPT | Performed by: EMERGENCY MEDICINE

## 2020-10-02 PROCEDURE — 85730 THROMBOPLASTIN TIME PARTIAL: CPT | Performed by: EMERGENCY MEDICINE

## 2020-10-02 PROCEDURE — 70450 CT HEAD/BRAIN W/O DYE: CPT

## 2020-10-02 PROCEDURE — 36415 COLL VENOUS BLD VENIPUNCTURE: CPT | Performed by: EMERGENCY MEDICINE

## 2020-10-02 PROCEDURE — 85610 PROTHROMBIN TIME: CPT | Performed by: EMERGENCY MEDICINE

## 2020-10-02 PROCEDURE — 1123F ACP DISCUSS/DSCN MKR DOCD: CPT | Performed by: EMERGENCY MEDICINE

## 2020-10-02 PROCEDURE — 82948 REAGENT STRIP/BLOOD GLUCOSE: CPT

## 2020-10-02 RX ORDER — MELATONIN
2000 DAILY
Status: DISCONTINUED | OUTPATIENT
Start: 2020-10-03 | End: 2020-10-06 | Stop reason: HOSPADM

## 2020-10-02 RX ORDER — CEFEPIME HYDROCHLORIDE 1 G/50ML
1000 INJECTION, SOLUTION INTRAVENOUS ONCE
Status: COMPLETED | OUTPATIENT
Start: 2020-10-02 | End: 2020-10-02

## 2020-10-02 RX ORDER — ONDANSETRON 2 MG/ML
4 INJECTION INTRAMUSCULAR; INTRAVENOUS EVERY 6 HOURS PRN
Status: DISCONTINUED | OUTPATIENT
Start: 2020-10-02 | End: 2020-10-06 | Stop reason: HOSPADM

## 2020-10-02 RX ORDER — TAMSULOSIN HYDROCHLORIDE 0.4 MG/1
0.4 CAPSULE ORAL
Status: DISCONTINUED | OUTPATIENT
Start: 2020-10-03 | End: 2020-10-06 | Stop reason: HOSPADM

## 2020-10-02 RX ORDER — DONEPEZIL HYDROCHLORIDE 5 MG/1
10 TABLET, FILM COATED ORAL
Status: DISCONTINUED | OUTPATIENT
Start: 2020-10-02 | End: 2020-10-06 | Stop reason: HOSPADM

## 2020-10-02 RX ORDER — HEPARIN SODIUM 5000 [USP'U]/ML
5000 INJECTION, SOLUTION INTRAVENOUS; SUBCUTANEOUS EVERY 8 HOURS SCHEDULED
Status: DISCONTINUED | OUTPATIENT
Start: 2020-10-02 | End: 2020-10-02

## 2020-10-02 RX ORDER — CEFEPIME HYDROCHLORIDE 1 G/50ML
1000 INJECTION, SOLUTION INTRAVENOUS EVERY 12 HOURS
Status: DISCONTINUED | OUTPATIENT
Start: 2020-10-02 | End: 2020-10-06 | Stop reason: HOSPADM

## 2020-10-02 RX ORDER — CLOPIDOGREL BISULFATE 75 MG/1
75 TABLET ORAL
Status: DISCONTINUED | OUTPATIENT
Start: 2020-10-02 | End: 2020-10-06 | Stop reason: HOSPADM

## 2020-10-02 RX ORDER — ATORVASTATIN CALCIUM 40 MG/1
40 TABLET, FILM COATED ORAL
Status: DISCONTINUED | OUTPATIENT
Start: 2020-10-02 | End: 2020-10-06 | Stop reason: HOSPADM

## 2020-10-02 RX ORDER — CHOLECALCIFEROL (VITAMIN D3) 50 MCG
2000 TABLET ORAL
Status: DISCONTINUED | OUTPATIENT
Start: 2020-10-03 | End: 2020-10-02 | Stop reason: SDUPTHER

## 2020-10-02 RX ORDER — LANOLIN ALCOHOL/MO/W.PET/CERES
400 CREAM (GRAM) TOPICAL
Status: DISCONTINUED | OUTPATIENT
Start: 2020-10-03 | End: 2020-10-06 | Stop reason: HOSPADM

## 2020-10-02 RX ORDER — ACETAMINOPHEN 325 MG/1
650 TABLET ORAL EVERY 6 HOURS PRN
Status: DISCONTINUED | OUTPATIENT
Start: 2020-10-02 | End: 2020-10-06 | Stop reason: HOSPADM

## 2020-10-02 RX ORDER — SODIUM CHLORIDE 9 MG/ML
50 INJECTION, SOLUTION INTRAVENOUS CONTINUOUS
Status: DISPENSED | OUTPATIENT
Start: 2020-10-02 | End: 2020-10-03

## 2020-10-02 RX ADMIN — DONEPEZIL HYDROCHLORIDE 10 MG: 5 TABLET ORAL at 21:36

## 2020-10-02 RX ADMIN — ATORVASTATIN CALCIUM 40 MG: 40 TABLET, FILM COATED ORAL at 21:36

## 2020-10-02 RX ADMIN — CEFEPIME HYDROCHLORIDE 1000 MG: 1 INJECTION, SOLUTION INTRAVENOUS at 16:14

## 2020-10-02 RX ADMIN — SODIUM CHLORIDE 500 ML: 0.9 INJECTION, SOLUTION INTRAVENOUS at 15:49

## 2020-10-02 RX ADMIN — CLOPIDOGREL BISULFATE 75 MG: 75 TABLET ORAL at 21:36

## 2020-10-02 RX ADMIN — IOHEXOL 100 ML: 350 INJECTION, SOLUTION INTRAVENOUS at 17:04

## 2020-10-02 RX ADMIN — SODIUM CHLORIDE 500 ML: 0.9 INJECTION, SOLUTION INTRAVENOUS at 19:04

## 2020-10-02 RX ADMIN — SODIUM CHLORIDE 50 ML/HR: 0.9 INJECTION, SOLUTION INTRAVENOUS at 20:51

## 2020-10-03 LAB
ALBUMIN SERPL BCP-MCNC: 2.2 G/DL (ref 3.5–5)
ALP SERPL-CCNC: 67 U/L (ref 46–116)
ALT SERPL W P-5'-P-CCNC: 9 U/L (ref 12–78)
ANION GAP SERPL CALCULATED.3IONS-SCNC: 9 MMOL/L (ref 4–13)
AST SERPL W P-5'-P-CCNC: 13 U/L (ref 5–45)
BILIRUB SERPL-MCNC: 0.4 MG/DL (ref 0.2–1)
BUN SERPL-MCNC: 24 MG/DL (ref 5–25)
CALCIUM ALBUM COR SERPL-MCNC: 8.8 MG/DL (ref 8.3–10.1)
CALCIUM SERPL-MCNC: 7.4 MG/DL (ref 8.3–10.1)
CHLORIDE SERPL-SCNC: 108 MMOL/L (ref 100–108)
CO2 SERPL-SCNC: 24 MMOL/L (ref 21–32)
CREAT SERPL-MCNC: 1.71 MG/DL (ref 0.6–1.3)
ERYTHROCYTE [DISTWIDTH] IN BLOOD BY AUTOMATED COUNT: 14.3 % (ref 11.6–15.1)
GFR SERPL CREATININE-BSD FRML MDRD: 35 ML/MIN/1.73SQ M
GLUCOSE SERPL-MCNC: 134 MG/DL (ref 65–140)
GLUCOSE SERPL-MCNC: 136 MG/DL (ref 65–140)
GLUCOSE SERPL-MCNC: 164 MG/DL (ref 65–140)
GLUCOSE SERPL-MCNC: 91 MG/DL (ref 65–140)
GLUCOSE SERPL-MCNC: 93 MG/DL (ref 65–140)
HCT VFR BLD AUTO: 26.4 % (ref 36.5–49.3)
HGB BLD-MCNC: 8.1 G/DL (ref 12–17)
LACTATE SERPL-SCNC: 1.5 MMOL/L (ref 0.5–2)
MCH RBC QN AUTO: 30.7 PG (ref 26.8–34.3)
MCHC RBC AUTO-ENTMCNC: 30.7 G/DL (ref 31.4–37.4)
MCV RBC AUTO: 100 FL (ref 82–98)
PLATELET # BLD AUTO: 248 THOUSANDS/UL (ref 149–390)
PMV BLD AUTO: 10.6 FL (ref 8.9–12.7)
POTASSIUM SERPL-SCNC: 4 MMOL/L (ref 3.5–5.3)
PROCALCITONIN SERPL-MCNC: 0.1 NG/ML
PROT SERPL-MCNC: 5.5 G/DL (ref 6.4–8.2)
RBC # BLD AUTO: 2.64 MILLION/UL (ref 3.88–5.62)
SODIUM SERPL-SCNC: 141 MMOL/L (ref 136–145)
WBC # BLD AUTO: 15.88 THOUSAND/UL (ref 4.31–10.16)

## 2020-10-03 PROCEDURE — 82948 REAGENT STRIP/BLOOD GLUCOSE: CPT

## 2020-10-03 PROCEDURE — 85027 COMPLETE CBC AUTOMATED: CPT | Performed by: NURSE PRACTITIONER

## 2020-10-03 PROCEDURE — 99232 SBSQ HOSP IP/OBS MODERATE 35: CPT | Performed by: INTERNAL MEDICINE

## 2020-10-03 PROCEDURE — 84145 PROCALCITONIN (PCT): CPT | Performed by: NURSE PRACTITIONER

## 2020-10-03 PROCEDURE — 80053 COMPREHEN METABOLIC PANEL: CPT | Performed by: NURSE PRACTITIONER

## 2020-10-03 PROCEDURE — 83605 ASSAY OF LACTIC ACID: CPT | Performed by: NURSE PRACTITIONER

## 2020-10-03 RX ORDER — SODIUM CHLORIDE 9 MG/ML
50 INJECTION, SOLUTION INTRAVENOUS CONTINUOUS
Status: DISPENSED | OUTPATIENT
Start: 2020-10-03 | End: 2020-10-03

## 2020-10-03 RX ADMIN — Medication 400 MCG: at 06:13

## 2020-10-03 RX ADMIN — CEFEPIME HYDROCHLORIDE 1000 MG: 1 INJECTION, SOLUTION INTRAVENOUS at 21:02

## 2020-10-03 RX ADMIN — SODIUM CHLORIDE 50 ML/HR: 0.9 INJECTION, SOLUTION INTRAVENOUS at 11:51

## 2020-10-03 RX ADMIN — DONEPEZIL HYDROCHLORIDE 10 MG: 5 TABLET ORAL at 21:02

## 2020-10-03 RX ADMIN — TAMSULOSIN HYDROCHLORIDE 0.4 MG: 0.4 CAPSULE ORAL at 16:38

## 2020-10-03 RX ADMIN — CEFEPIME HYDROCHLORIDE 1000 MG: 1 INJECTION, SOLUTION INTRAVENOUS at 08:34

## 2020-10-03 RX ADMIN — CLOPIDOGREL BISULFATE 75 MG: 75 TABLET ORAL at 21:02

## 2020-10-03 RX ADMIN — INSULIN LISPRO 1 UNITS: 100 INJECTION, SOLUTION INTRAVENOUS; SUBCUTANEOUS at 21:10

## 2020-10-03 RX ADMIN — ATORVASTATIN CALCIUM 40 MG: 40 TABLET, FILM COATED ORAL at 21:02

## 2020-10-03 RX ADMIN — VITAMIN D, TAB 1000IU (100/BT) 2000 UNITS: 25 TAB at 06:14

## 2020-10-04 LAB
ANION GAP SERPL CALCULATED.3IONS-SCNC: 9 MMOL/L (ref 4–13)
BASOPHILS # BLD AUTO: 0.05 THOUSANDS/ΜL (ref 0–0.1)
BASOPHILS NFR BLD AUTO: 0 % (ref 0–1)
BUN SERPL-MCNC: 20 MG/DL (ref 5–25)
CALCIUM SERPL-MCNC: 7.3 MG/DL (ref 8.3–10.1)
CHLORIDE SERPL-SCNC: 108 MMOL/L (ref 100–108)
CO2 SERPL-SCNC: 24 MMOL/L (ref 21–32)
CREAT SERPL-MCNC: 1.53 MG/DL (ref 0.6–1.3)
EOSINOPHIL # BLD AUTO: 0.18 THOUSAND/ΜL (ref 0–0.61)
EOSINOPHIL NFR BLD AUTO: 1 % (ref 0–6)
ERYTHROCYTE [DISTWIDTH] IN BLOOD BY AUTOMATED COUNT: 14.4 % (ref 11.6–15.1)
GFR SERPL CREATININE-BSD FRML MDRD: 40 ML/MIN/1.73SQ M
GLUCOSE SERPL-MCNC: 106 MG/DL (ref 65–140)
GLUCOSE SERPL-MCNC: 107 MG/DL (ref 65–140)
GLUCOSE SERPL-MCNC: 145 MG/DL (ref 65–140)
GLUCOSE SERPL-MCNC: 152 MG/DL (ref 65–140)
GLUCOSE SERPL-MCNC: 176 MG/DL (ref 65–140)
HCT VFR BLD AUTO: 27.4 % (ref 36.5–49.3)
HGB BLD-MCNC: 8.3 G/DL (ref 12–17)
IMM GRANULOCYTES # BLD AUTO: 0.08 THOUSAND/UL (ref 0–0.2)
IMM GRANULOCYTES NFR BLD AUTO: 1 % (ref 0–2)
LYMPHOCYTES # BLD AUTO: 0.82 THOUSANDS/ΜL (ref 0.6–4.47)
LYMPHOCYTES NFR BLD AUTO: 6 % (ref 14–44)
MCH RBC QN AUTO: 30.2 PG (ref 26.8–34.3)
MCHC RBC AUTO-ENTMCNC: 30.3 G/DL (ref 31.4–37.4)
MCV RBC AUTO: 100 FL (ref 82–98)
MONOCYTES # BLD AUTO: 0.84 THOUSAND/ΜL (ref 0.17–1.22)
MONOCYTES NFR BLD AUTO: 6 % (ref 4–12)
NEUTROPHILS # BLD AUTO: 11.99 THOUSANDS/ΜL (ref 1.85–7.62)
NEUTS SEG NFR BLD AUTO: 86 % (ref 43–75)
NRBC BLD AUTO-RTO: 0 /100 WBCS
PLATELET # BLD AUTO: 275 THOUSANDS/UL (ref 149–390)
PMV BLD AUTO: 10.5 FL (ref 8.9–12.7)
POTASSIUM SERPL-SCNC: 3.6 MMOL/L (ref 3.5–5.3)
RBC # BLD AUTO: 2.75 MILLION/UL (ref 3.88–5.62)
SODIUM SERPL-SCNC: 141 MMOL/L (ref 136–145)
WBC # BLD AUTO: 13.96 THOUSAND/UL (ref 4.31–10.16)

## 2020-10-04 PROCEDURE — 82948 REAGENT STRIP/BLOOD GLUCOSE: CPT

## 2020-10-04 PROCEDURE — 99232 SBSQ HOSP IP/OBS MODERATE 35: CPT | Performed by: INTERNAL MEDICINE

## 2020-10-04 PROCEDURE — 97167 OT EVAL HIGH COMPLEX 60 MIN: CPT

## 2020-10-04 PROCEDURE — 85025 COMPLETE CBC W/AUTO DIFF WBC: CPT | Performed by: INTERNAL MEDICINE

## 2020-10-04 PROCEDURE — 87086 URINE CULTURE/COLONY COUNT: CPT | Performed by: INTERNAL MEDICINE

## 2020-10-04 PROCEDURE — 80048 BASIC METABOLIC PNL TOTAL CA: CPT | Performed by: INTERNAL MEDICINE

## 2020-10-04 RX ADMIN — ATORVASTATIN CALCIUM 40 MG: 40 TABLET, FILM COATED ORAL at 21:02

## 2020-10-04 RX ADMIN — INSULIN LISPRO 1 UNITS: 100 INJECTION, SOLUTION INTRAVENOUS; SUBCUTANEOUS at 12:48

## 2020-10-04 RX ADMIN — Medication 400 MCG: at 06:10

## 2020-10-04 RX ADMIN — INSULIN LISPRO 1 UNITS: 100 INJECTION, SOLUTION INTRAVENOUS; SUBCUTANEOUS at 22:43

## 2020-10-04 RX ADMIN — CEFEPIME HYDROCHLORIDE 1000 MG: 1 INJECTION, SOLUTION INTRAVENOUS at 08:54

## 2020-10-04 RX ADMIN — VITAMIN D, TAB 1000IU (100/BT) 2000 UNITS: 25 TAB at 08:54

## 2020-10-04 RX ADMIN — DONEPEZIL HYDROCHLORIDE 10 MG: 5 TABLET ORAL at 21:02

## 2020-10-04 RX ADMIN — CLOPIDOGREL BISULFATE 75 MG: 75 TABLET ORAL at 21:03

## 2020-10-04 RX ADMIN — TAMSULOSIN HYDROCHLORIDE 0.4 MG: 0.4 CAPSULE ORAL at 16:20

## 2020-10-04 RX ADMIN — CEFEPIME HYDROCHLORIDE 1000 MG: 1 INJECTION, SOLUTION INTRAVENOUS at 20:58

## 2020-10-05 LAB
ANION GAP SERPL CALCULATED.3IONS-SCNC: 8 MMOL/L (ref 4–13)
BACTERIA UR CULT: NORMAL
BASOPHILS # BLD AUTO: 0.06 THOUSANDS/ΜL (ref 0–0.1)
BASOPHILS NFR BLD AUTO: 1 % (ref 0–1)
BUN SERPL-MCNC: 15 MG/DL (ref 5–25)
CALCIUM SERPL-MCNC: 7.1 MG/DL (ref 8.3–10.1)
CHLORIDE SERPL-SCNC: 109 MMOL/L (ref 100–108)
CO2 SERPL-SCNC: 24 MMOL/L (ref 21–32)
CREAT SERPL-MCNC: 1.35 MG/DL (ref 0.6–1.3)
EOSINOPHIL # BLD AUTO: 0.19 THOUSAND/ΜL (ref 0–0.61)
EOSINOPHIL NFR BLD AUTO: 2 % (ref 0–6)
ERYTHROCYTE [DISTWIDTH] IN BLOOD BY AUTOMATED COUNT: 14.2 % (ref 11.6–15.1)
GFR SERPL CREATININE-BSD FRML MDRD: 46 ML/MIN/1.73SQ M
GLUCOSE SERPL-MCNC: 108 MG/DL (ref 65–140)
GLUCOSE SERPL-MCNC: 112 MG/DL (ref 65–140)
GLUCOSE SERPL-MCNC: 129 MG/DL (ref 65–140)
GLUCOSE SERPL-MCNC: 131 MG/DL (ref 65–140)
GLUCOSE SERPL-MCNC: 172 MG/DL (ref 65–140)
HCT VFR BLD AUTO: 26.9 % (ref 36.5–49.3)
HGB BLD-MCNC: 8.2 G/DL (ref 12–17)
IMM GRANULOCYTES # BLD AUTO: 0.05 THOUSAND/UL (ref 0–0.2)
IMM GRANULOCYTES NFR BLD AUTO: 1 % (ref 0–2)
LYMPHOCYTES # BLD AUTO: 0.91 THOUSANDS/ΜL (ref 0.6–4.47)
LYMPHOCYTES NFR BLD AUTO: 9 % (ref 14–44)
MCH RBC QN AUTO: 29.9 PG (ref 26.8–34.3)
MCHC RBC AUTO-ENTMCNC: 30.5 G/DL (ref 31.4–37.4)
MCV RBC AUTO: 98 FL (ref 82–98)
MONOCYTES # BLD AUTO: 0.73 THOUSAND/ΜL (ref 0.17–1.22)
MONOCYTES NFR BLD AUTO: 8 % (ref 4–12)
NEUTROPHILS # BLD AUTO: 7.72 THOUSANDS/ΜL (ref 1.85–7.62)
NEUTS SEG NFR BLD AUTO: 79 % (ref 43–75)
NRBC BLD AUTO-RTO: 0 /100 WBCS
PLATELET # BLD AUTO: 255 THOUSANDS/UL (ref 149–390)
PMV BLD AUTO: 10.5 FL (ref 8.9–12.7)
POTASSIUM SERPL-SCNC: 3.4 MMOL/L (ref 3.5–5.3)
RBC # BLD AUTO: 2.74 MILLION/UL (ref 3.88–5.62)
SODIUM SERPL-SCNC: 141 MMOL/L (ref 136–145)
WBC # BLD AUTO: 9.66 THOUSAND/UL (ref 4.31–10.16)

## 2020-10-05 PROCEDURE — 85025 COMPLETE CBC W/AUTO DIFF WBC: CPT | Performed by: INTERNAL MEDICINE

## 2020-10-05 PROCEDURE — 99232 SBSQ HOSP IP/OBS MODERATE 35: CPT | Performed by: INTERNAL MEDICINE

## 2020-10-05 PROCEDURE — 97163 PT EVAL HIGH COMPLEX 45 MIN: CPT

## 2020-10-05 PROCEDURE — 97110 THERAPEUTIC EXERCISES: CPT

## 2020-10-05 PROCEDURE — 90662 IIV NO PRSV INCREASED AG IM: CPT | Performed by: INTERNAL MEDICINE

## 2020-10-05 PROCEDURE — 80048 BASIC METABOLIC PNL TOTAL CA: CPT | Performed by: INTERNAL MEDICINE

## 2020-10-05 PROCEDURE — G0008 ADMIN INFLUENZA VIRUS VAC: HCPCS | Performed by: INTERNAL MEDICINE

## 2020-10-05 PROCEDURE — 82948 REAGENT STRIP/BLOOD GLUCOSE: CPT

## 2020-10-05 RX ORDER — POTASSIUM CHLORIDE 20 MEQ/1
40 TABLET, EXTENDED RELEASE ORAL ONCE
Status: COMPLETED | OUTPATIENT
Start: 2020-10-05 | End: 2020-10-05

## 2020-10-05 RX ADMIN — ATORVASTATIN CALCIUM 40 MG: 40 TABLET, FILM COATED ORAL at 22:42

## 2020-10-05 RX ADMIN — DONEPEZIL HYDROCHLORIDE 10 MG: 5 TABLET ORAL at 22:42

## 2020-10-05 RX ADMIN — POTASSIUM CHLORIDE 40 MEQ: 1500 TABLET, EXTENDED RELEASE ORAL at 09:04

## 2020-10-05 RX ADMIN — TAMSULOSIN HYDROCHLORIDE 0.4 MG: 0.4 CAPSULE ORAL at 16:51

## 2020-10-05 RX ADMIN — INFLUENZA A VIRUS A/MICHIGAN/45/2015 X-275 (H1N1) ANTIGEN (FORMALDEHYDE INACTIVATED), INFLUENZA A VIRUS A/SINGAPORE/INFIMH-16-0019/2016 IVR-186 (H3N2) ANTIGEN (FORMALDEHYDE INACTIVATED), INFLUENZA B VIRUS B/PHUKET/3073/2013 ANTIGEN (FORMALDEHYDE INACTIVATED), AND INFLUENZA B VIRUS B/MARYLAND/15/2016 BX-69A ANTIGEN (FORMALDEHYDE INACTIVATED) 0.7 ML: 60; 60; 60; 60 INJECTION, SUSPENSION INTRAMUSCULAR at 22:42

## 2020-10-05 RX ADMIN — VITAMIN D, TAB 1000IU (100/BT) 2000 UNITS: 25 TAB at 08:56

## 2020-10-05 RX ADMIN — Medication 400 MCG: at 05:26

## 2020-10-05 RX ADMIN — INSULIN LISPRO 1 UNITS: 100 INJECTION, SOLUTION INTRAVENOUS; SUBCUTANEOUS at 12:36

## 2020-10-05 RX ADMIN — CLOPIDOGREL BISULFATE 75 MG: 75 TABLET ORAL at 22:42

## 2020-10-05 RX ADMIN — CEFEPIME HYDROCHLORIDE 1000 MG: 1 INJECTION, SOLUTION INTRAVENOUS at 20:19

## 2020-10-05 RX ADMIN — CEFEPIME HYDROCHLORIDE 1000 MG: 1 INJECTION, SOLUTION INTRAVENOUS at 08:56

## 2020-10-06 VITALS
HEART RATE: 77 BPM | TEMPERATURE: 97.3 F | BODY MASS INDEX: 20.47 KG/M2 | WEIGHT: 151.1 LBS | DIASTOLIC BLOOD PRESSURE: 56 MMHG | OXYGEN SATURATION: 95 % | HEIGHT: 72 IN | RESPIRATION RATE: 18 BRPM | SYSTOLIC BLOOD PRESSURE: 127 MMHG

## 2020-10-06 PROBLEM — N17.9 AKI (ACUTE KIDNEY INJURY) (HCC): Status: RESOLVED | Noted: 2020-10-02 | Resolved: 2020-10-06

## 2020-10-06 PROBLEM — R65.10 SIRS (SYSTEMIC INFLAMMATORY RESPONSE SYNDROME) (HCC): Status: RESOLVED | Noted: 2020-10-02 | Resolved: 2020-10-06

## 2020-10-06 LAB
ANION GAP SERPL CALCULATED.3IONS-SCNC: 7 MMOL/L (ref 4–13)
BUN SERPL-MCNC: 13 MG/DL (ref 5–25)
CALCIUM SERPL-MCNC: 7.4 MG/DL (ref 8.3–10.1)
CHLORIDE SERPL-SCNC: 108 MMOL/L (ref 100–108)
CO2 SERPL-SCNC: 25 MMOL/L (ref 21–32)
CREAT SERPL-MCNC: 1.41 MG/DL (ref 0.6–1.3)
GFR SERPL CREATININE-BSD FRML MDRD: 44 ML/MIN/1.73SQ M
GLUCOSE SERPL-MCNC: 109 MG/DL (ref 65–140)
GLUCOSE SERPL-MCNC: 166 MG/DL (ref 65–140)
GLUCOSE SERPL-MCNC: 187 MG/DL (ref 65–140)
GLUCOSE SERPL-MCNC: 203 MG/DL (ref 65–140)
POTASSIUM SERPL-SCNC: 3.9 MMOL/L (ref 3.5–5.3)
SODIUM SERPL-SCNC: 140 MMOL/L (ref 136–145)

## 2020-10-06 PROCEDURE — 97530 THERAPEUTIC ACTIVITIES: CPT

## 2020-10-06 PROCEDURE — 80048 BASIC METABOLIC PNL TOTAL CA: CPT | Performed by: INTERNAL MEDICINE

## 2020-10-06 PROCEDURE — 99239 HOSP IP/OBS DSCHRG MGMT >30: CPT | Performed by: FAMILY MEDICINE

## 2020-10-06 PROCEDURE — 82948 REAGENT STRIP/BLOOD GLUCOSE: CPT

## 2020-10-06 RX ORDER — VALSARTAN 80 MG/1
80 TABLET ORAL
Refills: 0
Start: 2020-10-09 | End: 2021-09-16 | Stop reason: HOSPADM

## 2020-10-06 RX ORDER — LEVOFLOXACIN 250 MG/1
250 TABLET ORAL DAILY
Refills: 0
Start: 2020-10-06 | End: 2020-10-09

## 2020-10-06 RX ADMIN — CEFEPIME HYDROCHLORIDE 1000 MG: 1 INJECTION, SOLUTION INTRAVENOUS at 08:43

## 2020-10-06 RX ADMIN — TAMSULOSIN HYDROCHLORIDE 0.4 MG: 0.4 CAPSULE ORAL at 15:54

## 2020-10-06 RX ADMIN — VITAMIN D, TAB 1000IU (100/BT) 2000 UNITS: 25 TAB at 08:43

## 2020-10-06 RX ADMIN — Medication 400 MCG: at 05:04

## 2020-10-06 RX ADMIN — INSULIN LISPRO 1 UNITS: 100 INJECTION, SOLUTION INTRAVENOUS; SUBCUTANEOUS at 08:43

## 2020-10-08 LAB
BACTERIA BLD CULT: NORMAL
BACTERIA BLD CULT: NORMAL

## 2020-10-09 ENCOUNTER — TELEPHONE (OUTPATIENT)
Dept: GASTROENTEROLOGY | Facility: AMBULARY SURGERY CENTER | Age: 85
End: 2020-10-09

## 2021-01-25 ENCOUNTER — TELEPHONE (OUTPATIENT)
Dept: NEUROLOGY | Facility: CLINIC | Age: 86
End: 2021-01-25

## 2021-01-25 NOTE — TELEPHONE ENCOUNTER
Spoke w/ Lona @ [facility]  Setup virtual visit via [facility]  She will send facesheet with med list, vitals, etc  Prior to the appt tomorrow  Email: [email or web address]     Work cell # - [phone]

## 2021-01-26 ENCOUNTER — TELEMEDICINE (OUTPATIENT)
Dept: NEUROLOGY | Facility: CLINIC | Age: 86
End: 2021-01-26
Payer: MEDICARE

## 2021-01-26 VITALS
BODY MASS INDEX: 23.7 KG/M2 | DIASTOLIC BLOOD PRESSURE: 78 MMHG | TEMPERATURE: 98.4 F | WEIGHT: 175 LBS | HEART RATE: 67 BPM | SYSTOLIC BLOOD PRESSURE: 146 MMHG | HEIGHT: 72 IN

## 2021-01-26 DIAGNOSIS — F02.80 SENILE DEMENTIA OF ALZHEIMER'S TYPE (HCC): Primary | ICD-10-CM

## 2021-01-26 DIAGNOSIS — G30.1 SENILE DEMENTIA OF ALZHEIMER'S TYPE (HCC): Primary | ICD-10-CM

## 2021-01-26 DIAGNOSIS — H91.93 BILATERAL HEARING LOSS, UNSPECIFIED HEARING LOSS TYPE: ICD-10-CM

## 2021-01-26 DIAGNOSIS — Z86.73 HISTORY OF TIA (TRANSIENT ISCHEMIC ATTACK): ICD-10-CM

## 2021-01-26 PROCEDURE — 99214 OFFICE O/P EST MOD 30 MIN: CPT | Performed by: PSYCHIATRY & NEUROLOGY

## 2021-01-26 RX ORDER — ACETAMINOPHEN 325 MG/1
650 TABLET ORAL EVERY 6 HOURS PRN
COMMUNITY

## 2021-01-26 RX ORDER — NATEGLINIDE 120 MG/1
120 TABLET ORAL DAILY
COMMUNITY

## 2021-01-26 RX ORDER — MIDODRINE HYDROCHLORIDE 2.5 MG/1
1 TABLET ORAL 3 TIMES DAILY
COMMUNITY
Start: 2020-12-31

## 2021-01-26 NOTE — PROGRESS NOTES
Virtual Regular Visit      Assessment/Plan:    Problem List Items Addressed This Visit     None      Visit Diagnoses     Senile dementia of Alzheimer's type ([facility] [address] )    -  Primary    History of TIA (transient ischemic attack)        Bilateral hearing loss, unspecified hearing loss type            Patient has remained stable in terms of severity of his dementia  Part of his problem is hearing loss but he refuses evaluation  He may resume aricept  Discussed that if patient becomes combative, can consider small dose seroquel  Reason for visit is dementia  Encounter provider Kameron Mock MD    Provider located at [address] [place] [address] [id number] [phone]      Recent Visits  Date Type Provider Dept   01/25/21 Telephone Home Cain MA [facility] recent visits within past 7 days and meeting all other requirements     Today's Visits  Date Type Provider Dept   01/26/21 Telemedicine Kameron Mock MD [facility] [place]   Showing today's visits and meeting all other requirements     Future Appointments  No visits were found meeting these conditions  Showing future appointments within next 150 days and meeting all other requirements        The patient was identified by name and date of birth  Lore Stoner was informed that this is a telemedicine visit and that the visit is being conducted through [employer] and patient was informed that this is a secure, HIPAA-compliant platform  He agrees to proceed     My office door was closed  No one else was in the room  He acknowledged consent and understanding of privacy and security of the video platform  The patient has agreed to participate and understands they can discontinue the visit at any time  Patient is aware this is a billable service       Subjective/HPI  Lore Stoner is a 80 y o  male  With PMH of HTN, HLD, TIA is being followed up for dementia  Patient was initially brought to us on 10/16/19 with short term memory loss   He can not remember where some stores are  He drives without difficulty but wife was guiding him for directions  He will repeatedly forget where things are  Patient lost his wife last year  She had cancer and it was sudden  Patient was seen in sept of 2020 for TIA  He was sent to rehab and now is at the facility for 2 months  He has tendency to be aggressive but hasn't hit anyone  Staff says they can manage him  He has severe hearing impairment but refuses to get hearing aides which makes communication very difficult  Patient was ordered to have MRI brain neuroquant  It revealed diffuse age appropriate volume loss, moderate degree of chronic microvascular disease  neuroquant analysis did reveal inferomedial temporal lobe neuro degenerative process       Patient is placed on Aricept 10mg daily  Past Medical History:   Diagnosis Date    Anemia     getting venofer once a week       Arthritis     Cancer ([address] )     NOSE    Coronary artery disease     carotid stents bilateral    Diabetes mellitus ([address] )     Hearing aid worn     BILATERAL    History of transfusion 10/11/2015    Hyperlipidemia     Hypertension     Spinal stenosis     hx of L-ROHAN       Past Surgical History:   Procedure Laterality Date    APPENDECTOMY      CAROTID STENT Bilateral     CATARACT EXTRACTION W/ INTRAOCULAR LENS IMPLANT Left 2013    CHOLECYSTECTOMY      CYSTOSCOPY W/ URETERAL STENT PLACEMENT Right     CYSTOSCOPY W/ URETERAL STENT REMOVAL      FEMORAL ARTERY STENT  2013    HEMORRHOID SURGERY      HERNIA REPAIR      HIP ARTHROPLASTY Left 2013    JOINT REPLACEMENT Left 2012    MT TOTAL HIP ARTHROPLASTY Right 2/1/2016    Procedure: ARTHROPLASTY HIP TOTAL;  Surgeon: Sherry Martin MD;  Location: [facility];  Service: Orthopedics    SKIN BIOPSY      removal of skin cancer nose    TONSILLECTOMY         Current Outpatient Medications   Medication Sig Dispense Refill    acetaminophen (TYLENOL) 325 mg tablet Take 650 mg by mouth every 6 (six) hours as needed for mild pain      atorvastatin (LIPITOR) 40 mg tablet Take 40 mg by mouth daily at bedtime   Cholecalciferol (VITAMIN D) 2000 UNITS tablet Take 2,000 Units by mouth daily in the early morning   clopidogrel (PLAVIX) 75 mg tablet Take 1 tablet (75 mg total) by mouth daily at bedtime  0    donepezil (ARICEPT) 10 mg tablet TAKE 1 TABLET (10 MG TOTAL) BY MOUTH DAILY AT BEDTIME STARTING JULY 14TH 30 tablet 3    folic acid (FOLVITE) 870 MCG tablet Take 400 mcg by mouth daily in the early morning   midodrine (PROAMATINE) 2 5 mg tablet Take 1 tablet by mouth 3 (three) times a day      nateglinide (STARLIX) 120 mg tablet Take 120 mg by mouth daily 30 min daily before meals      tamsulosin (FLOMAX) 0 4 mg 0 4 mg daily with dinner       VITRON-C  MG TABS 2 (two) times a day       Iron Sucrose (VENOFER IV) Infuse into a venous catheter   metFORMIN (GLUCOPHAGE) 500 mg tablet Take 1,000 mg by mouth 2 (two) times a day with meals       valsartan (DIOVAN) 80 mg tablet Take 1 tablet (80 mg total) by mouth daily in the early morning  0    VASCEPA 1 g CAPS TAKE ONE CAPSULE BY MOUTH TWICE DAILY WITH FOOD SWALLOWING WHOLE  DO NOT CHEW, OPEN, DISSOVE AND/OR CRUSH  No current facility-administered medications for this visit  Allergies   Allergen Reactions    Other Swelling     Bee stings       Review of Systems   HENT: Positive for hearing loss  Neurological:        Memory loss    All other systems reviewed and are negative  Video Exam    Vitals:    01/26/21 1412   BP: 146/78   Pulse: 67   Temp: 98 4 °F (36 9 °C)   Weight: 79 4 kg (175 lb)   Height: 6' (1 829 m)       Physical Exam  Vitals signs reviewed  Neurological:      General: No focal deficit present  Mental Status: He is alert  Mental status is at baseline     Psychiatric: Comments: Angry          I spent 30  minutes with patient today in which greater than 50% of the time was spent in counseling/coordination of care regarding his condition and plan  VIRTUAL VISIT DISCLAIMER    Juan Jose Corona acknowledges that he has consented to an online visit or consultation  He understands that the online visit is based solely on information provided by him, and that, in the absence of a face-to-face physical evaluation by the physician, the diagnosis he receives is both limited and provisional in terms of accuracy and completeness  This is not intended to replace a full medical face-to-face evaluation by the physician  Juan Jose Corona understands and accepts these terms

## 2021-09-03 ENCOUNTER — APPOINTMENT (EMERGENCY)
Dept: RADIOLOGY | Facility: HOSPITAL | Age: 86
End: 2021-09-03
Attending: EMERGENCY MEDICINE
Payer: MEDICARE

## 2021-09-03 ENCOUNTER — HOSPITAL ENCOUNTER (EMERGENCY)
Facility: HOSPITAL | Age: 86
Discharge: HOME | End: 2021-09-03
Attending: EMERGENCY MEDICINE | Admitting: EMERGENCY MEDICINE
Payer: MEDICARE

## 2021-09-03 VITALS
SYSTOLIC BLOOD PRESSURE: 109 MMHG | TEMPERATURE: 98.3 F | RESPIRATION RATE: 20 BRPM | DIASTOLIC BLOOD PRESSURE: 55 MMHG | OXYGEN SATURATION: 97 % | HEART RATE: 64 BPM

## 2021-09-03 DIAGNOSIS — U07.1 COVID-19: Primary | ICD-10-CM

## 2021-09-03 LAB
ALBUMIN SERPL BCP-MCNC: 3.2 G/DL (ref 3.5–5)
ALP SERPL-CCNC: 106 U/L (ref 46–116)
ALT SERPL W P-5'-P-CCNC: 17 U/L (ref 12–78)
ANION GAP SERPL CALCULATED.3IONS-SCNC: 9 MMOL/L (ref 4–13)
APTT PPP: 29 SECONDS (ref 23–37)
AST SERPL W P-5'-P-CCNC: 15 U/L (ref 5–45)
BACTERIA UR QL AUTO: NORMAL /HPF
BASOPHILS # BLD AUTO: 0.05 THOUSANDS/ΜL (ref 0–0.1)
BASOPHILS NFR BLD AUTO: 0 % (ref 0–1)
BILIRUB SERPL-MCNC: 0.38 MG/DL (ref 0.2–1)
BILIRUB UR QL STRIP: NEGATIVE
BUN SERPL-MCNC: 17 MG/DL (ref 5–25)
CALCIUM ALBUM COR SERPL-MCNC: 9 MG/DL (ref 8.3–10.1)
CALCIUM SERPL-MCNC: 8.4 MG/DL (ref 8.3–10.1)
CHLORIDE SERPL-SCNC: 102 MMOL/L (ref 100–108)
CLARITY UR: CLEAR
CO2 SERPL-SCNC: 29 MMOL/L (ref 21–32)
COLOR UR: YELLOW
CREAT SERPL-MCNC: 1.47 MG/DL (ref 0.6–1.3)
EOSINOPHIL # BLD AUTO: 0.05 THOUSAND/ΜL (ref 0–0.61)
EOSINOPHIL NFR BLD AUTO: 0 % (ref 0–6)
ERYTHROCYTE [DISTWIDTH] IN BLOOD BY AUTOMATED COUNT: 13.7 % (ref 11.6–15.1)
GFR SERPL CREATININE-BSD FRML MDRD: 41 ML/MIN/1.73SQ M
GLUCOSE SERPL-MCNC: 105 MG/DL (ref 65–140)
GLUCOSE UR STRIP-MCNC: NEGATIVE MG/DL
HCT VFR BLD AUTO: 39.4 % (ref 36.5–49.3)
HGB BLD-MCNC: 12.3 G/DL (ref 12–17)
HGB UR QL STRIP.AUTO: ABNORMAL
IMM GRANULOCYTES # BLD AUTO: 0.07 THOUSAND/UL (ref 0–0.2)
IMM GRANULOCYTES NFR BLD AUTO: 1 % (ref 0–2)
INR PPP: 1.16 (ref 0.84–1.19)
KETONES UR STRIP-MCNC: ABNORMAL MG/DL
LEUKOCYTE ESTERASE UR QL STRIP: NEGATIVE
LYMPHOCYTES # BLD AUTO: 1.19 THOUSANDS/ΜL (ref 0.6–4.47)
LYMPHOCYTES NFR BLD AUTO: 10 % (ref 14–44)
MCH RBC QN AUTO: 29.9 PG (ref 26.8–34.3)
MCHC RBC AUTO-ENTMCNC: 31.2 G/DL (ref 31.4–37.4)
MCV RBC AUTO: 96 FL (ref 82–98)
MONOCYTES # BLD AUTO: 1.6 THOUSAND/ΜL (ref 0.17–1.22)
MONOCYTES NFR BLD AUTO: 13 % (ref 4–12)
NEUTROPHILS # BLD AUTO: 8.99 THOUSANDS/ΜL (ref 1.85–7.62)
NEUTS SEG NFR BLD AUTO: 76 % (ref 43–75)
NITRITE UR QL STRIP: NEGATIVE
NON-SQ EPI CELLS URNS QL MICRO: NORMAL /HPF
NRBC BLD AUTO-RTO: 0 /100 WBCS
PH UR STRIP.AUTO: 7 [PH]
PLATELET # BLD AUTO: 164 THOUSANDS/UL (ref 149–390)
PMV BLD AUTO: 10.8 FL (ref 8.9–12.7)
POTASSIUM SERPL-SCNC: 4 MMOL/L (ref 3.5–5.3)
PROT SERPL-MCNC: 7.2 G/DL (ref 6.4–8.2)
PROT UR STRIP-MCNC: ABNORMAL MG/DL
PROTHROMBIN TIME: 14.5 SECONDS (ref 11.6–14.5)
RBC # BLD AUTO: 4.12 MILLION/UL (ref 3.88–5.62)
RBC #/AREA URNS AUTO: NORMAL /HPF
SARS-COV-2 RNA RESP QL NAA+PROBE: POSITIVE
SODIUM SERPL-SCNC: 140 MMOL/L (ref 136–145)
SP GR UR STRIP.AUTO: 1.01 (ref 1–1.03)
TROPONIN I SERPL-MCNC: <0.02 NG/ML
UROBILINOGEN UR QL STRIP.AUTO: 0.2 E.U./DL
WBC # BLD AUTO: 11.95 THOUSAND/UL (ref 4.31–10.16)
WBC #/AREA URNS AUTO: NORMAL /HPF

## 2021-09-03 PROCEDURE — 99284 EMERGENCY DEPT VISIT MOD MDM: CPT

## 2021-09-03 PROCEDURE — 87040 BLOOD CULTURE FOR BACTERIA: CPT | Performed by: EMERGENCY MEDICINE

## 2021-09-03 PROCEDURE — 96361 HYDRATE IV INFUSION ADD-ON: CPT

## 2021-09-03 PROCEDURE — 84484 ASSAY OF TROPONIN QUANT: CPT | Performed by: EMERGENCY MEDICINE

## 2021-09-03 PROCEDURE — 93005 ELECTROCARDIOGRAM TRACING: CPT

## 2021-09-03 PROCEDURE — 85730 THROMBOPLASTIN TIME PARTIAL: CPT | Performed by: EMERGENCY MEDICINE

## 2021-09-03 PROCEDURE — 87086 URINE CULTURE/COLONY COUNT: CPT | Performed by: EMERGENCY MEDICINE

## 2021-09-03 PROCEDURE — 36415 COLL VENOUS BLD VENIPUNCTURE: CPT | Performed by: EMERGENCY MEDICINE

## 2021-09-03 PROCEDURE — U0005 INFEC AGEN DETEC AMPLI PROBE: HCPCS | Performed by: EMERGENCY MEDICINE

## 2021-09-03 PROCEDURE — 96360 HYDRATION IV INFUSION INIT: CPT

## 2021-09-03 PROCEDURE — 85025 COMPLETE CBC W/AUTO DIFF WBC: CPT | Performed by: EMERGENCY MEDICINE

## 2021-09-03 PROCEDURE — U0003 INFECTIOUS AGENT DETECTION BY NUCLEIC ACID (DNA OR RNA); SEVERE ACUTE RESPIRATORY SYNDROME CORONAVIRUS 2 (SARS-COV-2) (CORONAVIRUS DISEASE [COVID-19]), AMPLIFIED PROBE TECHNIQUE, MAKING USE OF HIGH THROUGHPUT TECHNOLOGIES AS DESCRIBED BY CMS-2020-01-R: HCPCS | Performed by: EMERGENCY MEDICINE

## 2021-09-03 PROCEDURE — 99285 EMERGENCY DEPT VISIT HI MDM: CPT | Performed by: EMERGENCY MEDICINE

## 2021-09-03 PROCEDURE — 80053 COMPREHEN METABOLIC PANEL: CPT | Performed by: EMERGENCY MEDICINE

## 2021-09-03 PROCEDURE — 85610 PROTHROMBIN TIME: CPT | Performed by: EMERGENCY MEDICINE

## 2021-09-03 PROCEDURE — 71045 X-RAY EXAM CHEST 1 VIEW: CPT

## 2021-09-03 PROCEDURE — 81001 URINALYSIS AUTO W/SCOPE: CPT | Performed by: EMERGENCY MEDICINE

## 2021-09-03 RX ORDER — ACETAMINOPHEN 325 MG/1
650 TABLET ORAL ONCE
Status: COMPLETED | OUTPATIENT
Start: 2021-09-03 | End: 2021-09-03

## 2021-09-03 RX ADMIN — SODIUM CHLORIDE 500 ML: 0.9 INJECTION, SOLUTION INTRAVENOUS at 20:43

## 2021-09-03 RX ADMIN — ACETAMINOPHEN 650 MG: 325 TABLET, FILM COATED ORAL at 21:45

## 2021-09-04 NOTE — ED NOTES
[address] Micah Howard and report given to [address] [place] [address] of that facility       Luther Newton RN  09/03/21 5709

## 2021-09-04 NOTE — DISCHARGE INSTRUCTIONS
The patient's Covid test is positive  His xray and other labs do not look bad  His pulse ox is running 95% at rest   He has been referred for monoclonal antibody treatment to try to help prevent need for hospitalization  There is no other treatment indicated at this time  He should isolate, rest, tylenol for fever  He will be weak and may run fever, cough, diarrhea  Monitor his pulse ox periodically - he should return to ER if pulse ox is consistently <92% or he is in severe breathing distress

## 2021-09-04 NOTE — ED NOTES
Pts daughter in law called for update, pt unable to consent for RN to speak but daughter in law is listed on pts emergency contact list  She was made aware pt has not been in ED long and is still pending testing   Will call back in approx two hours for disposition      Jerson Kumar RN  09/03/21 2031

## 2021-09-04 NOTE — ED NOTES
Repeat call to [facility] for transport ETA   (able and patriot unavailable) searching for alternate transport      Janie Khalil RN  09/03/21 9130

## 2021-09-04 NOTE — ED PROVIDER NOTES
History  Chief Complaint   Patient presents with    Lethargy     Per facility pt has been more lethargic than normal today  Per EMS pt was able to amblulate with assistance, Pt has no c/o     81 yo male from NH  Per NH (I spoke with them on the phone), pt  Was noted to need assistance getting up and walking which is unusual for him  He seemed to be weak all over and they say he complained of feeling weak  He had a temp of 99 4  No cough, vomiting, pain or other symptoms noted  He usually ambulates with walker and does have some baseline confusion  History provided by:  EMS personnel and nursing home   used: No        Prior to Admission Medications   Prescriptions Last Dose Informant Patient Reported? Taking? Cholecalciferol (VITAMIN D) 2000 UNITS tablet   Yes No   Sig: Take 2,000 Units by mouth daily in the early morning  Iron Sucrose (VENOFER IV)   Yes No   Sig: Infuse into a venous catheter  Patient not taking: Reported on 2021   VASCEPA 1 g CAPS   Yes No   Sig: TAKE ONE CAPSULE BY MOUTH TWICE DAILY WITH FOOD SWALLOWING WHOLE  DO NOT CHEW, OPEN, DISSOVE AND/OR CRUSH  Patient not taking: Reported on 2021   VITRON-C  MG TABS   Yes No   Si (two) times a day    Patient not taking: Reported on 2021   acetaminophen (TYLENOL) 325 mg tablet   Yes No   Sig: Take 650 mg by mouth every 6 (six) hours as needed for mild pain   Patient not taking: Reported on 2021   atorvastatin (LIPITOR) 40 mg tablet   Yes No   Sig: Take 40 mg by mouth daily at bedtime  Patient not taking: Reported on 2021   clopidogrel (PLAVIX) 75 mg tablet   No No   Sig: Take 1 tablet (75 mg total) by mouth daily at bedtime   folic acid (FOLVITE) 324 MCG tablet   Yes No   Sig: Take 400 mcg by mouth daily in the early morning     midodrine (PROAMATINE) 2 5 mg tablet   Yes No   Sig: Take 1 tablet by mouth 3 (three) times a day   nateglinide (STARLIX) 120 mg tablet   Yes No   Sig: Take 120 mg by mouth daily 30 min daily before meals   tamsulosin (FLOMAX) 0 4 mg   Yes No   Si 4 mg daily with dinner       Facility-Administered Medications: None       Past Medical History:   Diagnosis Date    Anemia     getting venofer once a week   Arthritis     Cancer ([facility] [address] )     NOSE    Coronary artery disease     carotid stents bilateral    Diabetes mellitus ([facility] [address] )     Hearing aid worn     BILATERAL    History of transfusion 10/11/2015    Hyperlipidemia     Hypertension     Spinal stenosis     hx of L-ROHAN       Past Surgical History:   Procedure Laterality Date    APPENDECTOMY      CAROTID STENT Bilateral     CATARACT EXTRACTION W/ INTRAOCULAR LENS IMPLANT Left 2013    CHOLECYSTECTOMY      CYSTOSCOPY W/ URETERAL STENT PLACEMENT Right     CYSTOSCOPY W/ URETERAL STENT REMOVAL      FEMORAL ARTERY STENT  2013    HEMORRHOID SURGERY      HERNIA REPAIR      HIP ARTHROPLASTY Left 2013    JOINT REPLACEMENT Left 2012    [place] TOTAL HIP ARTHROPLASTY Right 2016    Procedure: ARTHROPLASTY HIP TOTAL;  Surgeon: Thomas Kim MD;  Location: [address] [place] [address];  Service: Orthopedics    SKIN BIOPSY      removal of skin cancer nose    TONSILLECTOMY         History reviewed  No pertinent family history  I have reviewed and agree with the history as documented  E-Cigarette/Vaping    E-Cigarette Use Never User      E-Cigarette/Vaping Substances     Social History     Tobacco Use    Smoking status: Former Smoker     Packs/day: 1 50     Years: 20 00     Pack years: 30 00     Quit date: 1996     Years since quittin 6    Smokeless tobacco: Never Used   Vaping Use    Vaping Use: Never used   Substance Use Topics    Alcohol use: Yes     Comment: occ    Drug use: No       Review of Systems   Constitutional: Negative  Negative for chills and fever  HENT: Negative  Negative for congestion and sore throat  Eyes: Negative  Respiratory: Negative    Negative for cough and shortness of breath  Cardiovascular: Negative  Negative for chest pain and leg swelling  Gastrointestinal: Negative  Negative for abdominal pain, diarrhea, nausea and vomiting  Genitourinary: Negative  Negative for dysuria, flank pain and hematuria  Musculoskeletal: Positive for gait problem  Negative for back pain and myalgias  Skin: Negative  Negative for rash and wound  Neurological: Positive for weakness  Negative for dizziness and headaches  Psychiatric/Behavioral: Negative  Negative for confusion and hallucinations  The patient is not nervous/anxious  All other systems reviewed and are negative  Physical Exam  Physical Exam  Vitals and nursing note reviewed  Constitutional:       General: He is not in acute distress  Appearance: He is well-developed  He is not diaphoretic  HENT:      Head: Normocephalic and atraumatic  Right Ear: External ear normal       Left Ear: External ear normal    Eyes:      General: No scleral icterus  Conjunctiva/sclera: Conjunctivae normal    Cardiovascular:      Rate and Rhythm: Normal rate and regular rhythm  Heart sounds: Normal heart sounds  No murmur heard  Pulmonary:      Effort: Pulmonary effort is normal  No respiratory distress  Breath sounds: Normal breath sounds  Chest:      Chest wall: No tenderness  Abdominal:      General: Bowel sounds are normal  There is no distension  Palpations: Abdomen is soft  Tenderness: There is no abdominal tenderness  Musculoskeletal:         General: No tenderness or deformity  Normal range of motion  Cervical back: Normal range of motion and neck supple  Right lower leg: No edema  Left lower leg: No edema  Skin:     General: Skin is warm and dry  Coloration: Skin is not pale  Findings: No erythema or rash  Neurological:      General: No focal deficit present  Mental Status: He is alert  Cranial Nerves: No cranial nerve deficit     Psychiatric: Behavior: Behavior normal          Vital Signs  ED Triage Vitals   Temperature Pulse Respirations Blood Pressure SpO2   09/03/21 1924 09/03/21 1924 09/03/21 1924 09/03/21 1924 09/03/21 1924   100 °F (37 8 °C) 78 16 139/67 91 %      Temp Source Heart Rate Source Patient Position - Orthostatic VS BP Location FiO2 (%)   09/03/21 1924 09/03/21 1924 09/03/21 1924 09/03/21 1924 --   Tympanic Monitor Lying Right arm       Pain Score       09/03/21 2145       5           Vitals:    09/03/21 1924 09/03/21 2100 09/03/21 2139 09/03/21 2302   BP: 139/67  (!) 178/70 109/55   Pulse: 78 66 73 64   Patient Position - Orthostatic VS: Lying  Lying          Visual Acuity      ED Medications  Medications   sodium chloride 0 9 % bolus 500 mL (0 mL Intravenous Stopped 9/3/21 2250)   acetaminophen (TYLENOL) tablet 650 mg (650 mg Oral Given 9/3/21 2145)       Diagnostic Studies  Results Reviewed     Procedure Component Value Units Date/Time    Blood culture #1 [[id number]] Collected: 09/03/21 2010    Lab Status: Final result Specimen: Blood Updated: 09/08/21 2301     Blood Culture No Growth After 5 Days  Blood culture #2 [[id number]] Collected: 09/03/21 2020    Lab Status: Final result Specimen: Blood from Arm, Left Updated: 09/08/21 2301     Blood Culture No Growth After 5 Days  Urine culture [[id number]] Collected: 09/03/21 2042    Lab Status: Final result Specimen: Urine, Clean Catch Updated: 09/05/21 0742     Urine Culture <10,000 cfu/ml     Urine Microscopic [[id number]]  (Normal) Collected: 09/03/21 2042    Lab Status: Final result Specimen: Urine, Clean Catch Updated: 09/03/21 2058     RBC, UA 1-2 /hpf      WBC, UA 1-2 /hpf      Epithelial Cells Occasional /hpf      Bacteria, UA Occasional /hpf     Novel Coronavirus [facility] [[id number]]  (Abnormal) Collected: 09/03/21 2004    Lab Status: Final result Specimen: Nares from Nasopharyngeal Swab Updated: 09/03/21 2058     SARS-CoV-2 Positive    Narrative:       The specimen collection materials, transport medium, and/or testing methodology utilized in the production of these test results have been proven to be reliable in a limited validation with an abbreviated program under the Emergency Utilization Authorization provided by the FDA  Testing reported as "Presumptive positive" will be confirmed with secondary testing to ensure result accuracy  Clinical caution and judgement should be used with the interpretation of these results with consideration of the clinical impression and other laboratory testing  Testing reported as "Positive" or "Negative" has been proven to be accurate according to standard laboratory validation requirements  All testing is performed with control materials showing appropriate reactivity at standard intervals        UA (URINE) with reflex to Scope [[id number]]  (Abnormal) Collected: 09/03/21 2042    Lab Status: Final result Specimen: Urine, Clean Catch Updated: 09/03/21 2051     Color, UA Yellow     Clarity, UA Clear     Specific Gravity, UA 1 015     pH, UA 7 0     Leukocytes, UA Negative     Nitrite, UA Negative     Protein, UA 30 (1+) mg/dl      Glucose, UA Negative mg/dl      Ketones, UA Trace mg/dl      Urobilinogen, UA 0 2 E U /dl      Bilirubin, UA Negative     Blood, UA Trace-Intact    Comprehensive metabolic panel [[id number]]  (Abnormal) Collected: 09/03/21 2020    Lab Status: Final result Specimen: Blood from Arm, Left Updated: 09/03/21 2043     Sodium 140 mmol/L      Potassium 4 0 mmol/L      Chloride 102 mmol/L      CO2 29 mmol/L      ANION GAP 9 mmol/L      BUN 17 mg/dL      Creatinine 1 47 mg/dL      Glucose 105 mg/dL      Calcium 8 4 mg/dL      Corrected Calcium 9 0 mg/dL      AST 15 U/L      ALT 17 U/L      Alkaline Phosphatase 106 U/L      Total Protein 7 2 g/dL      Albumin 3 2 g/dL      Total Bilirubin 0 38 mg/dL      eGFR 41 ml/min/1 73sq m     Narrative:      [place] guidelines for Chronic Kidney Disease (CKD):     Stage 1 with normal or high GFR (GFR > 90 mL/min/1 73 square meters)    Stage 2 Mild CKD (GFR = 60-89 mL/min/1 73 square meters)    Stage 3A Moderate CKD (GFR = 45-59 mL/min/1 73 square meters)    Stage 3B Moderate CKD (GFR = 30-44 mL/min/1 73 square meters)    Stage 4 Severe CKD (GFR = 15-29 mL/min/1 73 square meters)    Stage 5 End Stage CKD (GFR <15 mL/min/1 73 square meters)  Note: GFR calculation is accurate only with a steady state creatinine    Protime-INR [[id number]]  (Normal) Collected: 09/03/21 2020    Lab Status: Final result Specimen: Blood from Arm, Left Updated: 09/03/21 2038     Protime 14 5 seconds      INR 1 16    APTT [[id number]]  (Normal) Collected: 09/03/21 2020    Lab Status: Final result Specimen: Blood from Arm, Left Updated: 09/03/21 2038     PTT 29 seconds     Troponin I [[id number]]  (Normal) Collected: 09/03/21 2004    Lab Status: Final result Specimen: Blood from Arm, Left Updated: 09/03/21 2029     Troponin I <0 02 ng/mL     CBC and differential [[id number]]  (Abnormal) Collected: 09/03/21 2004    Lab Status: Final result Specimen: Blood from Arm, Left Updated: 09/03/21 2012     WBC 11 95 Thousand/uL      RBC 4 12 Million/uL      Hemoglobin 12 3 g/dL      Hematocrit 39 4 %      MCV 96 fL      MCH 29 9 pg      MCHC 31 2 g/dL      RDW 13 7 %      MPV 10 8 fL      Platelets 492 Thousands/uL      nRBC 0 /100 WBCs      Neutrophils Relative 76 %      Immat GRANS % 1 %      Lymphocytes Relative 10 %      Monocytes Relative 13 %      Eosinophils Relative 0 %      Basophils Relative 0 %      Neutrophils Absolute 8 99 Thousands/µL      Immature Grans Absolute 0 07 Thousand/uL      Lymphocytes Absolute 1 19 Thousands/µL      Monocytes Absolute 1 60 Thousand/µL      Eosinophils Absolute 0 05 Thousand/µL      Basophils Absolute 0 05 Thousands/µL                  XR chest 1 view portable   ED Interpretation by Garima Farah MD (05/24 2813)   Poor inspiration, no definite infiltrate      Final Result by Joon Owens MD (09/04 0930)      No acute cardiopulmonary disease  Workstation performed: [id number]                    Procedures  ECG 12 Lead Documentation Only    Date/Time: 9/3/2021 8:25 PM  Performed by: Amparo Stock MD  Authorized by: Amparo Stock MD     Indications / Diagnosis:  Weakness  ECG reviewed by me, the ED Provider: yes    Patient location:  ED  Previous ECG:     Previous ECG:  Compared to current    Similarity:  Changes noted  Interpretation:     Interpretation: abnormal    Rate:     ECG rate:  68    ECG rate assessment: normal    Rhythm:     Rhythm: sinus rhythm    Ectopy:     Ectopy: none    QRS:     QRS axis:  Left  Conduction:     Conduction: abnormal      Abnormal conduction: complete RBBB    ST segments:     ST segments:  Normal  T waves:     T waves: flattening      Flattening:  V4, V5 and V6             ED Course                             SBIRT 22yo+      Most Recent Value   SBIRT (22 yo +)   In order to provide better care to our patients, we are screening all of our patients for alcohol and drug use  Would it be okay to ask you these screening questions? Unable to answer at this time Filed at: 09/03/2021 2028                    MDM  Number of Diagnoses or Management Options  COVID-19  Diagnosis management comments: 2058 - lab called and pt  Is covid positive  2100 - pt's pulse ox is 95-96% at rest   Xray does not look bad  Labs are pretty unremarkable  Nurse spoke with family member and advised them of positive covid test   They told her pt  Had been vaccinated  Will discharge and refer for MAB treatment  NH advised isolate, symptomatic tx, monitor pulse ox and breathing, rest, tylenol          Disposition  Final diagnoses:   [id number]     Time reflects when diagnosis was documented in both MDM as applicable and the Disposition within this note     Time User Action Codes Description Comment    1/6/8090  5:45 PM Antoinette Christy Estonian Add [U07 1] COVID-19       ED Disposition     ED Disposition Condition Date/Time Comment    Discharge Stable Fri Sep 3, 2021  9:13 PM Zuleyka Tavera discharge to home/self care  Follow-up Information     Follow up With Specialties Details Why Contact Ted Boyer, DO Family Medicine  As needed [address]  [phone]            Discharge Medication List as of 9/3/2021  9:17 PM      CONTINUE these medications which have NOT CHANGED    Details   Cholecalciferol (VITAMIN D) 2000 UNITS tablet Take 2,000 Units by mouth daily in the early morning , Until Discontinued, Historical Med      clopidogrel (PLAVIX) 75 mg tablet Take 1 tablet (75 mg total) by mouth daily at bedtime, Starting Mon 9/21/2020, No Print      folic acid (FOLVITE) 387 MCG tablet Take 400 mcg by mouth daily in the early morning , Until Discontinued, Historical Med      midodrine (PROAMATINE) 2 5 mg tablet Take 1 tablet by mouth 3 (three) times a day, Starting Thu 12/31/2020, Historical Med      nateglinide (STARLIX) 120 mg tablet Take 120 mg by mouth daily 30 min daily before meals, Historical Med      tamsulosin (FLOMAX) 0 4 mg 0 4 mg daily with dinner , Starting Wed 3/18/2020, Historical Med      donepezil (ARICEPT) 10 mg tablet TAKE 1 TABLET (10 MG TOTAL) BY MOUTH DAILY AT BEDTIME STARTING JULY 14TH, Starting Sun 6/21/2020, Normal      acetaminophen (TYLENOL) 325 mg tablet Take 650 mg by mouth every 6 (six) hours as needed for mild pain, Historical Med      atorvastatin (LIPITOR) 40 mg tablet Take 40 mg by mouth daily at bedtime  , Until Discontinued, Historical Med      Iron Sucrose (VENOFER IV) Infuse into a venous catheter , Until Discontinued, Historical Med      VASCEPA 1 g CAPS TAKE ONE CAPSULE BY MOUTH TWICE DAILY WITH FOOD SWALLOWING WHOLE  DO NOT CHEW, OPEN, DISSOVE AND/OR CRUSH , Historical Med      VITRON-C  MG TABS 2 (two) times a day , Starting Mon 4/20/2020, Historical Med      metFORMIN (GLUCOPHAGE) 500 mg tablet Take 1,000 mg by mouth 2 (two) times a day with meals , Historical Med      valsartan (DIOVAN) 80 mg tablet Take 1 tablet (80 mg total) by mouth daily in the early morning, Starting Fri 10/9/2020, No Print           No discharge procedures on file      PDMP Review     None          ED Provider  Electronically Signed by           Tanvi Shaver MD  53/54/65 Αμαλίας MD Cheryl  63/02/13 4111

## 2021-09-04 NOTE — ED NOTES
Call to [facility] for transport back to nursing home   Will call back for ETA of transport     Jerson Kumar RN  09/03/21 9200

## 2021-09-04 NOTE — ED NOTES
Transportation being arrange through [facility] since patient is being d/c     Jessica Oglesby, MADDI  09/03/21 5513

## 2021-09-05 LAB — BACTERIA UR CULT: NORMAL

## 2021-09-06 LAB
ATRIAL RATE: 68 BPM
P AXIS: 16 DEGREES
PR INTERVAL: 188 MS
QRS AXIS: -34 DEGREES
QRSD INTERVAL: 148 MS
QT INTERVAL: 436 MS
QTC INTERVAL: 463 MS
T WAVE AXIS: 0 DEGREES
VENTRICULAR RATE: 68 BPM

## 2021-09-06 PROCEDURE — 93010 ELECTROCARDIOGRAM REPORT: CPT | Performed by: INTERNAL MEDICINE

## 2021-09-07 ENCOUNTER — TELEPHONE (OUTPATIENT)
Dept: FAMILY MEDICINE CLINIC | Facility: CLINIC | Age: 86
End: 2021-09-07

## 2021-09-07 NOTE — TELEPHONE ENCOUNTER
Made outreach attempt to discuss infusion  Patient did not answer and detailed voicemail was left including my contact information

## 2021-09-07 NOTE — TELEPHONE ENCOUNTER
----- Message from Tanvi Shaver MD sent at 7/0/0498  9:19 PM EDT -----  Referral for MAB treatment  [facility]  MRN [id number]

## 2021-09-08 LAB
BACTERIA BLD CULT: NORMAL
BACTERIA BLD CULT: NORMAL

## 2021-09-12 ENCOUNTER — APPOINTMENT (EMERGENCY)
Dept: RADIOLOGY | Facility: HOSPITAL | Age: 86
DRG: 177 | End: 2021-09-12
Payer: MEDICARE

## 2021-09-12 ENCOUNTER — HOSPITAL ENCOUNTER (INPATIENT)
Facility: HOSPITAL | Age: 86
LOS: 4 days | Discharge: NURSING, REHAB OR LONG-TERM CARE | DRG: 177 | End: 2021-09-16
Attending: EMERGENCY MEDICINE | Admitting: INTERNAL MEDICINE
Payer: MEDICARE

## 2021-09-12 DIAGNOSIS — U07.1 COVID-19: Primary | ICD-10-CM

## 2021-09-12 DIAGNOSIS — R09.02 HYPOXIA: ICD-10-CM

## 2021-09-12 DIAGNOSIS — E11.69 TYPE 2 DIABETES MELLITUS WITH OTHER SPECIFIED COMPLICATION, WITHOUT LONG-TERM CURRENT USE OF INSULIN (HCC): ICD-10-CM

## 2021-09-12 LAB
ABO GROUP BLD: NORMAL
ALBUMIN SERPL BCP-MCNC: 3.9 G/DL (ref 3.4–4.8)
ALP SERPL-CCNC: 84 U/L (ref 10–129)
ALT SERPL W P-5'-P-CCNC: 14 U/L (ref 5–63)
ANION GAP SERPL CALCULATED.3IONS-SCNC: 10 MMOL/L (ref 4–13)
AST SERPL W P-5'-P-CCNC: 24 U/L (ref 15–41)
BASOPHILS # BLD MANUAL: 0 THOUSAND/UL (ref 0–0.1)
BASOPHILS NFR MAR MANUAL: 0 % (ref 0–1)
BILIRUB DIRECT SERPL-MCNC: 0.07 MG/DL (ref 0–0.3)
BILIRUB SERPL-MCNC: 0.77 MG/DL (ref 0.3–1.2)
BNP SERPL-MCNC: 422.4 PG/ML (ref 1–100)
BUN SERPL-MCNC: 28 MG/DL (ref 6–20)
CALCIUM SERPL-MCNC: 9 MG/DL (ref 8.4–10.2)
CHLORIDE SERPL-SCNC: 100 MMOL/L (ref 96–108)
CO2 SERPL-SCNC: 29 MMOL/L (ref 22–33)
CREAT SERPL-MCNC: 1.37 MG/DL (ref 0.5–1.2)
CRP SERPL QL: 14.1 MG/DL (ref 0–1)
EOSINOPHIL # BLD MANUAL: 0 THOUSAND/UL (ref 0–0.4)
EOSINOPHIL NFR BLD MANUAL: 0 % (ref 0–6)
ERYTHROCYTE [DISTWIDTH] IN BLOOD BY AUTOMATED COUNT: 14.3 % (ref 11.6–15.1)
GFR SERPL CREATININE-BSD FRML MDRD: 45 ML/MIN/1.73SQ M
GLUCOSE SERPL-MCNC: 205 MG/DL (ref 65–140)
GLUCOSE SERPL-MCNC: 212 MG/DL (ref 65–140)
GLUCOSE SERPL-MCNC: 284 MG/DL (ref 65–140)
HCT VFR BLD AUTO: 43.4 % (ref 36.5–49.3)
HGB BLD-MCNC: 14.1 G/DL (ref 12–17)
LYMPHOCYTES # BLD AUTO: 1.1 THOUSAND/UL (ref 0.6–4.47)
LYMPHOCYTES # BLD AUTO: 10 % (ref 14–44)
MCH RBC QN AUTO: 29.6 PG (ref 26.8–34.3)
MCHC RBC AUTO-ENTMCNC: 32.5 G/DL (ref 31.4–37.4)
MCV RBC AUTO: 91 FL (ref 82–98)
METAMYELOCYTES NFR BLD MANUAL: 1 % (ref 0–1)
MONOCYTES # BLD AUTO: 0.77 THOUSAND/UL (ref 0–1.22)
MONOCYTES NFR BLD: 7 % (ref 4–12)
NEUTROPHILS # BLD MANUAL: 8.78 THOUSAND/UL (ref 1.85–7.62)
NEUTS BAND NFR BLD MANUAL: 11 % (ref 0–8)
NEUTS SEG NFR BLD AUTO: 69 % (ref 43–75)
PLATELET # BLD AUTO: 174 THOUSANDS/UL (ref 149–390)
PLATELET BLD QL SMEAR: ADEQUATE
PMV BLD AUTO: 11.2 FL (ref 8.9–12.7)
POTASSIUM SERPL-SCNC: 4.3 MMOL/L (ref 3.5–5)
PROCALCITONIN SERPL-MCNC: 0.07 NG/ML
PROT SERPL-MCNC: 7.7 G/DL (ref 6.4–8.3)
RBC # BLD AUTO: 4.77 MILLION/UL (ref 3.88–5.62)
RBC MORPH BLD: NORMAL
RH BLD: POSITIVE
SODIUM SERPL-SCNC: 139 MMOL/L (ref 133–145)
TROPONIN I SERPL-MCNC: 0.06 NG/ML (ref 0–0.07)
VARIANT LYMPHS # BLD AUTO: 2 %
WBC # BLD AUTO: 10.98 THOUSAND/UL (ref 4.31–10.16)

## 2021-09-12 PROCEDURE — 86901 BLOOD TYPING SEROLOGIC RH(D): CPT | Performed by: EMERGENCY MEDICINE

## 2021-09-12 PROCEDURE — 80048 BASIC METABOLIC PNL TOTAL CA: CPT | Performed by: EMERGENCY MEDICINE

## 2021-09-12 PROCEDURE — 85027 COMPLETE CBC AUTOMATED: CPT | Performed by: EMERGENCY MEDICINE

## 2021-09-12 PROCEDURE — 99285 EMERGENCY DEPT VISIT HI MDM: CPT

## 2021-09-12 PROCEDURE — 36415 COLL VENOUS BLD VENIPUNCTURE: CPT | Performed by: EMERGENCY MEDICINE

## 2021-09-12 PROCEDURE — XW033E5 INTRODUCTION OF REMDESIVIR ANTI-INFECTIVE INTO PERIPHERAL VEIN, PERCUTANEOUS APPROACH, NEW TECHNOLOGY GROUP 5: ICD-10-PCS | Performed by: INTERNAL MEDICINE

## 2021-09-12 PROCEDURE — 71045 X-RAY EXAM CHEST 1 VIEW: CPT

## 2021-09-12 PROCEDURE — 85007 BL SMEAR W/DIFF WBC COUNT: CPT | Performed by: EMERGENCY MEDICINE

## 2021-09-12 PROCEDURE — 82948 REAGENT STRIP/BLOOD GLUCOSE: CPT

## 2021-09-12 PROCEDURE — 84484 ASSAY OF TROPONIN QUANT: CPT | Performed by: EMERGENCY MEDICINE

## 2021-09-12 PROCEDURE — 84145 PROCALCITONIN (PCT): CPT | Performed by: EMERGENCY MEDICINE

## 2021-09-12 PROCEDURE — 99285 EMERGENCY DEPT VISIT HI MDM: CPT | Performed by: EMERGENCY MEDICINE

## 2021-09-12 PROCEDURE — 96374 THER/PROPH/DIAG INJ IV PUSH: CPT

## 2021-09-12 PROCEDURE — 86140 C-REACTIVE PROTEIN: CPT | Performed by: EMERGENCY MEDICINE

## 2021-09-12 PROCEDURE — 83880 ASSAY OF NATRIURETIC PEPTIDE: CPT | Performed by: EMERGENCY MEDICINE

## 2021-09-12 PROCEDURE — 1124F ACP DISCUSS-NO DSCNMKR DOCD: CPT | Performed by: EMERGENCY MEDICINE

## 2021-09-12 PROCEDURE — 99223 1ST HOSP IP/OBS HIGH 75: CPT | Performed by: INTERNAL MEDICINE

## 2021-09-12 PROCEDURE — 86900 BLOOD TYPING SEROLOGIC ABO: CPT | Performed by: EMERGENCY MEDICINE

## 2021-09-12 PROCEDURE — 80076 HEPATIC FUNCTION PANEL: CPT | Performed by: EMERGENCY MEDICINE

## 2021-09-12 RX ORDER — DONEPEZIL HYDROCHLORIDE 5 MG/1
10 TABLET, FILM COATED ORAL
Status: DISCONTINUED | OUTPATIENT
Start: 2021-09-12 | End: 2021-09-16 | Stop reason: HOSPADM

## 2021-09-12 RX ORDER — DULOXETIN HYDROCHLORIDE 20 MG/1
20 CAPSULE, DELAYED RELEASE ORAL DAILY
COMMUNITY
End: 2021-09-24 | Stop reason: ALTCHOICE

## 2021-09-12 RX ORDER — MELATONIN
2000
Status: DISCONTINUED | OUTPATIENT
Start: 2021-09-13 | End: 2021-09-16 | Stop reason: HOSPADM

## 2021-09-12 RX ORDER — LOSARTAN POTASSIUM 50 MG/1
50 TABLET ORAL DAILY
Refills: 0 | Status: DISCONTINUED | OUTPATIENT
Start: 2021-09-12 | End: 2021-09-14

## 2021-09-12 RX ORDER — INSULIN GLARGINE 100 [IU]/ML
8 INJECTION, SOLUTION SUBCUTANEOUS
Status: ON HOLD | COMMUNITY
End: 2021-09-16 | Stop reason: SDUPTHER

## 2021-09-12 RX ORDER — MIDODRINE HYDROCHLORIDE 5 MG/1
2.5 TABLET ORAL 3 TIMES DAILY
Status: DISCONTINUED | OUTPATIENT
Start: 2021-09-12 | End: 2021-09-16 | Stop reason: HOSPADM

## 2021-09-12 RX ORDER — DEXAMETHASONE SODIUM PHOSPHATE 4 MG/ML
6 INJECTION, SOLUTION INTRA-ARTICULAR; INTRALESIONAL; INTRAMUSCULAR; INTRAVENOUS; SOFT TISSUE EVERY 24 HOURS
Status: DISCONTINUED | OUTPATIENT
Start: 2021-09-12 | End: 2021-09-16 | Stop reason: HOSPADM

## 2021-09-12 RX ORDER — CLOPIDOGREL BISULFATE 75 MG/1
75 TABLET ORAL
Status: DISCONTINUED | OUTPATIENT
Start: 2021-09-12 | End: 2021-09-16 | Stop reason: HOSPADM

## 2021-09-12 RX ORDER — METOPROLOL SUCCINATE 25 MG/1
25 TABLET, EXTENDED RELEASE ORAL DAILY
COMMUNITY

## 2021-09-12 RX ORDER — ACETAMINOPHEN 325 MG/1
650 TABLET ORAL EVERY 6 HOURS PRN
Status: DISCONTINUED | OUTPATIENT
Start: 2021-09-12 | End: 2021-09-16 | Stop reason: HOSPADM

## 2021-09-12 RX ORDER — ONDANSETRON 2 MG/ML
4 INJECTION INTRAMUSCULAR; INTRAVENOUS EVERY 6 HOURS PRN
Status: DISCONTINUED | OUTPATIENT
Start: 2021-09-12 | End: 2021-09-16 | Stop reason: HOSPADM

## 2021-09-12 RX ORDER — ATORVASTATIN CALCIUM 40 MG/1
40 TABLET, FILM COATED ORAL
Status: DISCONTINUED | OUTPATIENT
Start: 2021-09-12 | End: 2021-09-16 | Stop reason: HOSPADM

## 2021-09-12 RX ORDER — TAMSULOSIN HYDROCHLORIDE 0.4 MG/1
0.4 CAPSULE ORAL
Status: DISCONTINUED | OUTPATIENT
Start: 2021-09-12 | End: 2021-09-16 | Stop reason: HOSPADM

## 2021-09-12 RX ORDER — LANOLIN ALCOHOL/MO/W.PET/CERES
400 CREAM (GRAM) TOPICAL
Status: DISCONTINUED | OUTPATIENT
Start: 2021-09-13 | End: 2021-09-16 | Stop reason: HOSPADM

## 2021-09-12 RX ADMIN — CLOPIDOGREL BISULFATE 75 MG: 75 TABLET ORAL at 21:23

## 2021-09-12 RX ADMIN — TAMSULOSIN HYDROCHLORIDE 0.4 MG: 0.4 CAPSULE ORAL at 16:39

## 2021-09-12 RX ADMIN — ENOXAPARIN SODIUM 80 MG: 80 INJECTION SUBCUTANEOUS at 21:25

## 2021-09-12 RX ADMIN — ENOXAPARIN SODIUM 80 MG: 80 INJECTION SUBCUTANEOUS at 15:19

## 2021-09-12 RX ADMIN — DONEPEZIL HYDROCHLORIDE 10 MG: 5 TABLET, FILM COATED ORAL at 21:23

## 2021-09-12 RX ADMIN — MIDODRINE HYDROCHLORIDE 2.5 MG: 5 TABLET ORAL at 15:19

## 2021-09-12 RX ADMIN — ATORVASTATIN CALCIUM 40 MG: 40 TABLET, FILM COATED ORAL at 21:23

## 2021-09-12 RX ADMIN — MIDODRINE HYDROCHLORIDE 2.5 MG: 5 TABLET ORAL at 21:22

## 2021-09-12 RX ADMIN — LOSARTAN POTASSIUM 50 MG: 50 TABLET, FILM COATED ORAL at 15:20

## 2021-09-12 RX ADMIN — DEXAMETHASONE SODIUM PHOSPHATE 6 MG: 4 INJECTION INTRA-ARTICULAR; INTRALESIONAL; INTRAMUSCULAR; INTRAVENOUS; SOFT TISSUE at 12:01

## 2021-09-12 RX ADMIN — INSULIN LISPRO 3 UNITS: 100 INJECTION, SOLUTION INTRAVENOUS; SUBCUTANEOUS at 21:26

## 2021-09-12 RX ADMIN — INSULIN LISPRO 2 UNITS: 100 INJECTION, SOLUTION INTRAVENOUS; SUBCUTANEOUS at 16:37

## 2021-09-12 RX ADMIN — REMDESIVIR 200 MG: 100 INJECTION, POWDER, LYOPHILIZED, FOR SOLUTION INTRAVENOUS at 16:41

## 2021-09-12 NOTE — ED PROVIDER NOTES
History  Chief Complaint   Patient presents with    Medical Problem     from [facility] living,facility  reported pt 86%  on RA, ems reported 91% on RA, 96% on 1L, +covid 10 days ago , now sat 93% on ra      80year-old male previous history of mild cognitive impairment on donepezil, diabetes, hypertension, CAD with stents on Plavix, hyperlipidemia presents with hypoxia  Reportedly saturating 86% on room air at the nursing facility  Nursing facility cannot place him on oxygen at his current level of care  COVID-19 was diagnosed a week ago  Patient is immunized against COVID-19 received his Pfizer vaccine in December and January  Patient is extremely hard of hearing and unable to provide history, though he tells me he has no complaints and is not short of breath at this point  Shortness of Breath  Severity:  Mild  Onset quality:  Gradual  Timing:  Constant  Progression:  Unchanged  Chronicity:  New  Relieved by:  Nothing  Worsened by:  Nothing  Ineffective treatments:  None tried      Prior to Admission Medications   Prescriptions Last Dose Informant Patient Reported? Taking? Cholecalciferol (VITAMIN D) 2000 UNITS tablet 2021 at Unknown time  Yes Yes   Sig: Take 2,000 Units by mouth daily in the early morning  DULoxetine (CYMBALTA) 20 mg capsule 2021 at Unknown time  Yes Yes   Sig: Take 20 mg by mouth daily   Iron Sucrose (VENOFER IV) Not Taking at Unknown time  Yes No   Sig: Infuse into a venous catheter  Patient not taking: Reported on 2021   VASCEPA 1 g CAPS Not Taking at Unknown time  Yes No   Sig: TAKE ONE CAPSULE BY MOUTH TWICE DAILY WITH FOOD SWALLOWING WHOLE  DO NOT CHEW, OPEN, DISSOVE AND/OR CRUSH     Patient not taking: Reported on 2021   VITRON-C  MG TABS Not Taking at Unknown time  Yes No   Si (two) times a day    Patient not taking: Reported on 2021   acetaminophen (TYLENOL) 325 mg tablet Not Taking at Unknown time  Yes No   Sig: Take 650 mg by mouth every 6 (six) hours as needed for mild pain   Patient not taking: Reported on 2021   atorvastatin (LIPITOR) 40 mg tablet Not Taking at Unknown time  Yes No   Sig: Take 40 mg by mouth daily at bedtime  Patient not taking: Reported on 2021   clopidogrel (PLAVIX) 75 mg tablet 2021 at Unknown time  No Yes   Sig: Take 1 tablet (75 mg total) by mouth daily at bedtime   donepezil (ARICEPT) 10 mg tablet 2021 at Unknown time  No Yes   Sig: TAKE 1 TABLET (10 MG TOTAL) BY MOUTH DAILY AT BEDTIME STARTING JULY 10OA   folic acid (FOLVITE) 435 MCG tablet 2021 at Unknown time  Yes Yes   Sig: Take 400 mcg by mouth daily in the early morning  insulin glargine (LANTUS) 100 units/mL subcutaneous injection 2021 at Unknown time  Yes Yes   Sig: Inject 8 Units under the skin daily at bedtime   metFORMIN (GLUCOPHAGE) 500 mg tablet Not Taking at Unknown time  Yes No   Sig: Take 1,000 mg by mouth 2 (two) times a day with meals    Patient not taking: Reported on 2021   metoprolol succinate (TOPROL-XL) 25 mg 24 hr tablet 2021 at Unknown time  Yes Yes   Sig: Take 25 mg by mouth daily   midodrine (PROAMATINE) 2 5 mg tablet 2021 at Unknown time  Yes Yes   Sig: Take 1 tablet by mouth 3 (three) times a day   nateglinide (STARLIX) 120 mg tablet 2021 at Unknown time  Yes Yes   Sig: Take 120 mg by mouth daily 30 min daily before meals   tamsulosin (FLOMAX) 0 4 mg 2021 at Unknown time  Yes Yes   Si 4 mg daily with dinner    valsartan (DIOVAN) 80 mg tablet Not Taking at Unknown time  No No   Sig: Take 1 tablet (80 mg total) by mouth daily in the early morning   Patient not taking: Reported on 2021      Facility-Administered Medications: None       Past Medical History:   Diagnosis Date    Anemia     getting venofer once a week       Arthritis     Cancer ([facility] [address] )     NOSE    Coronary artery disease     carotid stents bilateral    Diabetes mellitus ([facility] [address] )     Hearing aid worn BILATERAL    History of transfusion 10/11/2015    Hyperlipidemia     Hypertension     Spinal stenosis     hx of L-ROHAN       Past Surgical History:   Procedure Laterality Date    APPENDECTOMY      CAROTID STENT Bilateral     CATARACT EXTRACTION W/ INTRAOCULAR LENS IMPLANT Left 2013    CHOLECYSTECTOMY      CYSTOSCOPY W/ URETERAL STENT PLACEMENT Right     CYSTOSCOPY W/ URETERAL STENT REMOVAL      FEMORAL ARTERY STENT  2013    HEMORRHOID SURGERY      HERNIA REPAIR      HIP ARTHROPLASTY Left 2013    JOINT REPLACEMENT Left 2012    IN TOTAL HIP ARTHROPLASTY Right 2016    Procedure: ARTHROPLASTY HIP TOTAL;  Surgeon: Angeli Sears MD;  Location: [facility];  Service: Orthopedics    SKIN BIOPSY      removal of skin cancer nose    TONSILLECTOMY         No family history on file  I have reviewed and agree with the history as documented  E-Cigarette/Vaping    E-Cigarette Use Never User      E-Cigarette/Vaping Substances     Social History     Tobacco Use    Smoking status: Former Smoker     Packs/day: 1 50     Years: 20 00     Pack years: 30 00     Quit date: 1996     Years since quittin 6    Smokeless tobacco: Never Used   Vaping Use    Vaping Use: Never used   Substance Use Topics    Alcohol use: Yes     Comment: occ    Drug use: No       Review of Systems   Respiratory: Positive for shortness of breath (resolved)  All other systems reviewed and are negative  Physical Exam  Physical Exam  Constitutional:       General: He is not in acute distress  Appearance: He is well-developed  He is not diaphoretic  Comments: Hard of hearing  HENT:      Head: Normocephalic and atraumatic  Right Ear: External ear normal       Left Ear: External ear normal    Eyes:      Conjunctiva/sclera: Conjunctivae normal    Neck:      Vascular: No JVD  Trachea: No tracheal deviation  Cardiovascular:      Rate and Rhythm: Normal rate and regular rhythm        Heart sounds: Normal heart sounds  No murmur heard  Pulmonary:      Effort: No respiratory distress  Breath sounds: No stridor  Rales present  No wheezing  Abdominal:      General: Bowel sounds are normal  There is no distension  Palpations: Abdomen is soft  There is no mass  Tenderness: There is no abdominal tenderness  There is no guarding or rebound  Genitourinary:     Comments: Deferred  Musculoskeletal:         General: No tenderness or deformity  Skin:     General: Skin is warm and dry  Capillary Refill: Capillary refill takes less than 2 seconds  Coloration: Skin is not pale  Findings: No erythema or rash  Neurological:      Motor: No abnormal muscle tone  Coordination: Coordination normal    Psychiatric:         Behavior: Behavior normal          Thought Content:  Thought content normal          Judgment: Judgment normal          Vital Signs  ED Triage Vitals   Temperature Pulse Respirations Blood Pressure SpO2   09/12/21 1021 09/12/21 1021 09/12/21 1021 09/12/21 1021 09/12/21 1021   99 4 °F (37 4 °C) 80 16 146/73 95 %      Temp Source Heart Rate Source Patient Position - Orthostatic VS BP Location FiO2 (%)   09/12/21 1021 09/12/21 1021 09/12/21 1021 09/12/21 1021 --   Oral Monitor Lying Right arm       Pain Score       09/12/21 1100       No Pain           Vitals:    09/12/21 1200 09/12/21 1204 09/12/21 1300 09/12/21 1406   BP: 142/75 142/75 116/59 142/70   Pulse: 79 89 63 70   Patient Position - Orthostatic VS: Lying Lying Lying Lying         Visual Acuity      ED Medications  Medications   dexamethasone (DECADRON) injection 6 mg (6 mg Intravenous Given 9/12/21 1201)   atorvastatin (LIPITOR) tablet 40 mg (has no administration in time range)   cholecalciferol (VITAMIN D3) tablet 2,000 Units (has no administration in time range)   clopidogrel (PLAVIX) tablet 75 mg (has no administration in time range)   donepezil (ARICEPT) tablet 10 mg (has no administration in time range) folic acid (FOLVITE) tablet 400 mcg (has no administration in time range)   midodrine (PROAMATINE) tablet 2 5 mg (2 5 mg Oral Given 9/12/21 1519)   tamsulosin (FLOMAX) capsule 0 4 mg (0 4 mg Oral Given 9/12/21 1639)   losartan (COZAAR) tablet 50 mg (50 mg Oral Given 9/12/21 1520)   acetaminophen (TYLENOL) tablet 650 mg (has no administration in time range)   ondansetron (ZOFRAN) injection 4 mg (has no administration in time range)   insulin lispro (HumaLOG) 100 units/mL subcutaneous injection 1-5 Units (has no administration in time range)   insulin lispro (HumaLOG) 100 units/mL subcutaneous injection 1-6 Units (2 Units Subcutaneous Given 9/12/21 1637)   enoxaparin (LOVENOX) subcutaneous injection 80 mg (80 mg Subcutaneous Given 9/12/21 1519)   remdesivir (Veklury) 200 mg in sodium chloride 0 9 % 250 mL IVPB (200 mg Intravenous New Bag 9/12/21 1641)     Followed by   remdesivir 100 mg in 250 mL sodium chloride 0 9% (has no administration in time range)       Diagnostic Studies  Results Reviewed     Procedure Component Value Units Date/Time    CBC and differential [[id number]]  (Abnormal) Collected: 09/12/21 1031    Lab Status: Final result Specimen: Blood from Arm, Right Updated: 09/12/21 1131     WBC 10 98 Thousand/uL      RBC 4 77 Million/uL      Hemoglobin 14 1 g/dL      Hematocrit 43 4 %      MCV 91 fL      MCH 29 6 pg      MCHC 32 5 g/dL      RDW 14 3 %      MPV 11 2 fL      Platelets 602 Thousands/uL     Narrative: This is an appended report  These results have been appended to a previously verified report      Manual Differential(PHLEBS Do Not Order) [[id number]]  (Abnormal) Collected: 09/12/21 1031    Lab Status: Final result Specimen: Blood from Arm, Right Updated: 09/12/21 1131     Segmented % 69 %      Bands % 11 %      Lymphocytes % 10 %      Monocytes % 7 %      Eosinophils, % 0 %      Basophils % 0 %      Metamyelocytes% 1 %      Atypical Lymphocytes % 2 %      Absolute Neutrophils 8 78 Thousand/uL      Lymphocytes Absolute 1 10 Thousand/uL      Monocytes Absolute 0 77 Thousand/uL      Eosinophils Absolute 0 00 Thousand/uL      Basophils Absolute 0 00 Thousand/uL      Total Counted --     RBC Morphology Normal     Platelet Estimate Adequate    B-Type Natriuretic Peptide(BNP) CA, GH, EA Campuses Only [[id number]]  (Abnormal) Collected: 09/12/21 1031    Lab Status: Final result Specimen: Blood from Arm, Right Updated: 09/12/21 1105      4 pg/mL     Troponin I [[id number]]  (Normal) Collected: 09/12/21 1031    Lab Status: Final result Specimen: Blood from Arm, Right Updated: 09/12/21 1102     Troponin I 0 06 ng/mL     Basic metabolic panel [[id number]]  (Abnormal) Collected: 09/12/21 1031    Lab Status: Final result Specimen: Blood from Arm, Right Updated: 09/12/21 1100     Sodium 139 mmol/L      Potassium 4 3 mmol/L      Chloride 100 mmol/L      CO2 29 mmol/L      ANION GAP 10 mmol/L      BUN 28 mg/dL      Creatinine 1 37 mg/dL      Glucose 205 mg/dL      Calcium 9 0 mg/dL      eGFR 45 ml/min/1 73sq m     Narrative:      [facility] guidelines for Chronic Kidney Disease (CKD):     Stage 1 with normal or high GFR (GFR > 90 mL/min/1 73 square meters)    Stage 2 Mild CKD (GFR = 60-89 mL/min/1 73 square meters)    Stage 3A Moderate CKD (GFR = 45-59 mL/min/1 73 square meters)    Stage 3B Moderate CKD (GFR = 30-44 mL/min/1 73 square meters)    Stage 4 Severe CKD (GFR = 15-29 mL/min/1 73 square meters)    Stage 5 End Stage CKD (GFR <15 mL/min/1 73 square meters)  Note: GFR calculation is accurate only with a steady state creatinine    Hepatic function panel [[id number]]  (Normal) Collected: 09/12/21 1031    Lab Status: Final result Specimen: Blood from Arm, Right Updated: 09/12/21 1100     Total Bilirubin 0 77 mg/dL      Bilirubin, Direct 0 07 mg/dL      Alkaline Phosphatase 84 0 U/L      AST 24 U/L      ALT 14 U/L      Total Protein 7 7 g/dL      Albumin 3 9 g/dL C-reactive protein [[id number]]  (Abnormal) Collected: 09/12/21 1031    Lab Status: Final result Specimen: Blood from Arm, Right Updated: 09/12/21 1100     CRP 14 1 mg/dL     Procalcitonin with AM Reflex [[id number]] Collected: 09/12/21 1031    Lab Status: In process Specimen: Blood from Arm, Right Updated: 09/12/21 1038                 XR chest 1 view portable    (Results Pending)              Procedures  Procedures         ED Course                                           MDM  Number of Diagnoses or Management Options  COVID-19: new and requires workup  Hypoxia: new and requires workup  Diagnosis management comments: 19-year-old male hypoxic secondary to COVID-19  Will evaluate for lobar pneumonia  Per my read no lobar pneumonia  Will evaluate for electrolyte abnormalities, pneumothorax, arrhythmia  Workup without acute findings  Patient is on 3 L nasal cannula  Will admit for hypoxia, COVID-19         Amount and/or Complexity of Data Reviewed  Clinical lab tests: ordered and reviewed  Tests in the radiology section of CPT®: ordered and reviewed  Review and summarize past medical records: yes  Discuss the patient with other providers: yes  Independent visualization of images, tracings, or specimens: yes    Risk of Complications, Morbidity, and/or Mortality  Presenting problems: moderate  Diagnostic procedures: moderate  Management options: moderate    Patient Progress  Patient progress: stable      Disposition  Final diagnoses:   COVID-19   Hypoxia     Time reflects when diagnosis was documented in both MDM as applicable and the Disposition within this note     Time User Action Codes Description Comment    9/12/2021  1:11 PM Clarita Stake Add [U07 1,  R23 8] COVID toes     9/12/2021  1:11 PM Camille Monroy Husbands Remove [U07 1,  R23 8] COVID toes     9/12/2021  1:12 PM Clarita Stake Add [U07 1] COVID-19     9/12/2021  1:12 PM Clarita Stake Add [R09 02] Hypoxia       ED Disposition     ED Disposition Condition Date/Time Comment    Admit Stable Sun Sep 12, 2021  1:11 PM Case was discussed with meghna and the patient's admission status was agreed to be Admission Status: inpatient status to the service of Dr Lori Patel   Follow-up Information    None         Current Discharge Medication List      CONTINUE these medications which have NOT CHANGED    Details   Cholecalciferol (VITAMIN D) 2000 UNITS tablet Take 2,000 Units by mouth daily in the early morning  clopidogrel (PLAVIX) 75 mg tablet Take 1 tablet (75 mg total) by mouth daily at bedtime  Qty:  , Refills: 0    Associated Diagnoses: Weakness      donepezil (ARICEPT) 10 mg tablet TAKE 1 TABLET (10 MG TOTAL) BY MOUTH DAILY AT BEDTIME STARTING JULY 14TH  Qty: 30 tablet, Refills: 3    Associated Diagnoses: Short-term memory loss; Senile dementia of Alzheimer's type ([address] )      DULoxetine (CYMBALTA) 20 mg capsule Take 20 mg by mouth daily      folic acid (FOLVITE) 319 MCG tablet Take 400 mcg by mouth daily in the early morning  insulin glargine (LANTUS) 100 units/mL subcutaneous injection Inject 8 Units under the skin daily at bedtime      metoprolol succinate (TOPROL-XL) 25 mg 24 hr tablet Take 25 mg by mouth daily      midodrine (PROAMATINE) 2 5 mg tablet Take 1 tablet by mouth 3 (three) times a day      nateglinide (STARLIX) 120 mg tablet Take 120 mg by mouth daily 30 min daily before meals      tamsulosin (FLOMAX) 0 4 mg 0 4 mg daily with dinner       acetaminophen (TYLENOL) 325 mg tablet Take 650 mg by mouth every 6 (six) hours as needed for mild pain      atorvastatin (LIPITOR) 40 mg tablet Take 40 mg by mouth daily at bedtime  Iron Sucrose (VENOFER IV) Infuse into a venous catheter        metFORMIN (GLUCOPHAGE) 500 mg tablet Take 1,000 mg by mouth 2 (two) times a day with meals       valsartan (DIOVAN) 80 mg tablet Take 1 tablet (80 mg total) by mouth daily in the early morning  Qty:  , Refills: 0    Associated Diagnoses: Essential hypertension      VASCEPA 1 g CAPS TAKE ONE CAPSULE BY MOUTH TWICE DAILY WITH FOOD SWALLOWING WHOLE  DO NOT CHEW, OPEN, DISSOVE AND/OR CRUSH  VITRON-C  MG TABS 2 (two) times a day            No discharge procedures on file      [address] Review     None          ED Provider  Electronically Signed by           Siobhan Regan DO  09/12/21 0875

## 2021-09-12 NOTE — PLAN OF CARE
Problem: Prexisting or High Potential for Compromised Skin Integrity  Goal: Skin integrity is maintained or improved  Description: INTERVENTIONS:  - Identify patients at risk for skin breakdown  - Assess and monitor skin integrity  - Assess and monitor nutrition and hydration status  - Monitor labs   - Assess for incontinence   - Turn and reposition patient  - Assist with mobility/ambulation  - Relieve pressure over bony prominences  - Avoid friction and shearing  - Provide appropriate hygiene as needed including keeping skin clean and dry  - Evaluate need for skin moisturizer/barrier cream  - Collaborate with interdisciplinary team   - Patient/family teaching  - Consider wound care consult   Outcome: Progressing     Problem: Potential for Falls  Goal: Patient will remain free of falls  Description: INTERVENTIONS:  - Educate patient/family on patient safety including physical limitations  - Instruct patient to call for assistance with activity   - Consult OT/PT to assist with strengthening/mobility   - Keep Call bell within reach  - Keep bed low and locked with side rails adjusted as appropriate  - Keep care items and personal belongings within reach  - Initiate and maintain comfort rounds  - Make Fall Risk Sign visible to staff  - Offer Toileting every 2 Hours, in advance of need  - Initiate/Maintain alarm  - Obtain necessary fall risk management equipment:   - Apply yellow socks and bracelet for high fall risk patients  - Consider moving patient to room near nurses station  Outcome: Progressing     Problem: MOBILITY - ADULT  Goal: Maintain or return to baseline ADL function  Description: INTERVENTIONS:  -  Assess patient's ability to carry out ADLs; assess patient's baseline for ADL function and identify physical deficits which impact ability to perform ADLs (bathing, care of mouth/teeth, toileting, grooming, dressing, etc )  - Assess/evaluate cause of self-care deficits   - Assess range of motion  - Assess patient's mobility; develop plan if impaired  - Assess patient's need for assistive devices and provide as appropriate  - Encourage maximum independence but intervene and supervise when necessary  - Involve family in performance of ADLs  - Assess for home care needs following discharge   - Consider OT consult to assist with ADL evaluation and planning for discharge  - Provide patient education as appropriate  Outcome: Progressing  Goal: Maintains/Returns to pre admission functional level  Description: INTERVENTIONS:  - Perform BMAT or MOVE assessment daily    - Set and communicate daily mobility goal to care team and patient/family/caregiver  - Collaborate with rehabilitation services on mobility goals if consulted  - Perform Range of Motion 3 times a day  - Reposition patient every 2 hours    - Dangle patient  times a day  - Stand patient  times a day  - Ambulate patient  times a day  - Out of bed to chair  times a day   - Out of bed for meals  times a day  - Out of bed for toileting  - Record patient progress and toleration of activity level   Outcome: Progressing     Problem: INFECTION - ADULT  Goal: Absence or prevention of progression during hospitalization  Description: INTERVENTIONS:  - Assess and monitor for signs and symptoms of infection  - Monitor lab/diagnostic results  - Monitor all insertion sites, i e  indwelling lines, tubes, and drains  - Monitor endotracheal if appropriate and nasal secretions for changes in amount and color  - [place] appropriate cooling/warming therapies per order  - Administer medications as ordered  - Instruct and encourage patient and family to use good hand hygiene technique  - Identify and instruct in appropriate isolation precautions for identified infection/condition  Outcome: Progressing  Goal: Absence of fever/infection during neutropenic period  Description: INTERVENTIONS:  - Monitor WBC    Outcome: Progressing     Problem: SAFETY ADULT  Goal: Patient will remain free of falls  Description: INTERVENTIONS:  - Educate patient/family on patient safety including physical limitations  - Instruct patient to call for assistance with activity   - Consult OT/PT to assist with strengthening/mobility   - Keep Call bell within reach  - Keep bed low and locked with side rails adjusted as appropriate  - Keep care items and personal belongings within reach  - Initiate and maintain comfort rounds  - Make Fall Risk Sign visible to staff  - Offer Toileting every 2 Hours, in advance of need  - Initiate/Maintain alarm  - Obtain necessary fall risk management equipment:   - Apply yellow socks and bracelet for high fall risk patients  - Consider moving patient to room near nurses station  Outcome: Progressing  Goal: Maintain or return to baseline ADL function  Description: INTERVENTIONS:  -  Assess patient's ability to carry out ADLs; assess patient's baseline for ADL function and identify physical deficits which impact ability to perform ADLs (bathing, care of mouth/teeth, toileting, grooming, dressing, etc )  - Assess/evaluate cause of self-care deficits   - Assess range of motion  - Assess patient's mobility; develop plan if impaired  - Assess patient's need for assistive devices and provide as appropriate  - Encourage maximum independence but intervene and supervise when necessary  - Involve family in performance of ADLs  - Assess for home care needs following discharge   - Consider OT consult to assist with ADL evaluation and planning for discharge  - Provide patient education as appropriate  Outcome: Progressing  Goal: Maintains/Returns to pre admission functional level  Description: INTERVENTIONS:  - Perform BMAT or MOVE assessment daily    - Set and communicate daily mobility goal to care team and patient/family/caregiver  - Collaborate with rehabilitation services on mobility goals if consulted  - Perform Range of Motion  times a day  - Reposition patient every 2 hours    - Dangle patient  times a day  - Stand patient  times a day  - Ambulate patient  times a day  - Out of bed to chair  times a day   - Out of bed for meals  times a day  - Out of bed for toileting  - Record patient progress and toleration of activity level   Outcome: Progressing     Problem: DISCHARGE PLANNING  Goal: Discharge to home or other facility with appropriate resources  Description: INTERVENTIONS:  - Identify barriers to discharge w/patient and caregiver  - Arrange for needed discharge resources and transportation as appropriate  - Identify discharge learning needs (meds, wound care, etc )  - Arrange for interpretive services to assist at discharge as needed  - Refer to Case Management Department for coordinating discharge planning if the patient needs post-hospital services based on physician/advanced practitioner order or complex needs related to functional status, cognitive ability, or social support system  Outcome: Progressing     Problem: Knowledge Deficit  Goal: Patient/family/caregiver demonstrates understanding of disease process, treatment plan, medications, and discharge instructions  Description: Complete learning assessment and assess knowledge base    Interventions:  - Provide teaching at level of understanding  - Provide teaching via preferred learning methods  Outcome: Progressing

## 2021-09-12 NOTE — H&P
Tima U  66   H&P- Becka Bello 12/3/1930, 80 y o  male MRN: [id number]  Unit/Bed#: -01 Encounter: [id number]  Primary Care Provider: Arnoldo Olson DO   Date and time admitted to hospital: 9/12/2021 10:18 AM    * COVID-19  Assessment & Plan  · POA, patient tested positive for COVID-19 on 9/3  Has been lethargic and hypoxic at nursing home  Presently requiring 3 L NC for adequate saturation  Unable to provide much history  He is vaccinated   · Admit patient to med/surg under inpatient status   · Remdesivir  · Decadron  · Lovenox 1 mg/kg  · Trend CBC, CMP, CRP   · Monitor O2 requirements  · Supportive care     Hypoxia  Assessment & Plan  · Patient without signs of respiratory failure, but requiring 3 L NC to maintain adequate saturations  Secondary to COVID-19   · Continue plan as above     Dementia ([facility] [address] )  Assessment & Plan  · Unable to provide much history at baseline   · Continue Aricept  · Supportive care     Type 2 diabetes mellitus, without long-term current use of insulin ([facility])  Assessment & Plan  Lab Results   Component Value Date    HGBA1C 9 5 (H) 08/27/2021       No results for input(s): POCGLU in the last 72 hours  Blood Sugar Average: Last 72 hrs:  · BG elevated on admission  A1c above goal   · Hold Metformin, Starlix   · SSI algorithm with meals and bedtime   · QID accuchecks    Essential hypertension  Assessment & Plan  · BP acceptable currently   · Continue Midodrine TID, Losartan with hold parameters       VTE Pharmacologic Prophylaxis: VTE Score: 6 High Risk (Score >/= 5) - Pharmacological DVT Prophylaxis Ordered: enoxaparin (Lovenox)  Sequential Compression Devices Ordered  Code Status: Level 3 - DNAR and DNI   Discussion with family: None at baseline   Anticipated Length of Stay: Patient will be admitted on an inpatient basis with an anticipated length of stay of greater than 2 midnights secondary to As per above assessment and plan       Total Time for Visit, including Counseling / Coordination of Care: 70 minutes Greater than 50% of this total time spent on direct patient counseling and coordination of care  Chief Complaint: Hypoxia    History of Present Illness:  Matthew Macias is a 80 y o  male with a PMH of dementia, T2DM not on insulin, HTN who presents with COVID  Patient is vaccinated  At baseline he has dementia and is unable to provide history  As per ED note patient was less mobile and more lethargic at his nursing home which is abnormal for him  Review of Systems:  Review of Systems   Unable to perform ROS: Dementia       Past Medical and Surgical History:   Past Medical History:   Diagnosis Date    Anemia     getting venofer once a week   Arthritis     Cancer ([facility] [address] )     NOSE    Coronary artery disease     carotid stents bilateral    Diabetes mellitus ([facility] [address] )     Hearing aid worn     BILATERAL    History of transfusion 10/11/2015    Hyperlipidemia     Hypertension     Spinal stenosis     hx of L-ROHAN       Past Surgical History:   Procedure Laterality Date    APPENDECTOMY      CAROTID STENT Bilateral     CATARACT EXTRACTION W/ INTRAOCULAR LENS IMPLANT Left 2013    CHOLECYSTECTOMY      CYSTOSCOPY W/ URETERAL STENT PLACEMENT Right     CYSTOSCOPY W/ URETERAL STENT REMOVAL      FEMORAL ARTERY STENT  2013    HEMORRHOID SURGERY      HERNIA REPAIR      HIP ARTHROPLASTY Left 2013    JOINT REPLACEMENT Left 2012    NH TOTAL HIP ARTHROPLASTY Right 2/1/2016    Procedure: ARTHROPLASTY HIP TOTAL;  Surgeon: Kirsten Gonzalez MD;  Location: [address] [place] [address];  Service: Orthopedics    SKIN BIOPSY      removal of skin cancer nose    TONSILLECTOMY         Meds/Allergies:  Prior to Admission medications    Medication Sig Start Date End Date Taking?  Authorizing Provider   acetaminophen (TYLENOL) 325 mg tablet Take 650 mg by mouth every 6 (six) hours as needed for mild pain    Historical Provider, MD   atorvastatin (LIPITOR) 40 mg tablet Take 40 mg by mouth daily at bedtime  Historical Provider, MD   Cholecalciferol (VITAMIN D) 2000 UNITS tablet Take 2,000 Units by mouth daily in the early morning  Historical Provider, MD   clopidogrel (PLAVIX) 75 mg tablet Take 1 tablet (75 mg total) by mouth daily at bedtime 9/21/20   Luke Izquierdo MD   donepezil (ARICEPT) 10 mg tablet TAKE 1 TABLET (10 MG TOTAL) BY MOUTH DAILY AT BEDTIME STARTING JULY 14TH 6/21/20   Annemarie Benson MD   folic acid (FOLVITE) 694 MCG tablet Take 400 mcg by mouth daily in the early morning  Historical Provider, MD   Iron Sucrose (VENOFER IV) Infuse into a venous catheter  Historical Provider, MD   metFORMIN (GLUCOPHAGE) 500 mg tablet Take 1,000 mg by mouth 2 (two) times a day with meals     Historical Provider, MD   midodrine (PROAMATINE) 2 5 mg tablet Take 1 tablet by mouth 3 (three) times a day 12/31/20   Historical Provider, MD   nateglinide (STARLIX) 120 mg tablet Take 120 mg by mouth daily 30 min daily before meals    Historical Provider, MD   tamsulosin (FLOMAX) 0 4 mg 0 4 mg daily with dinner  3/18/20   Historical Provider, MD   valsartan (DIOVAN) 80 mg tablet Take 1 tablet (80 mg total) by mouth daily in the early morning 10/9/20   Gin Revankar,    VASCEPA 1 g CAPS TAKE ONE CAPSULE BY MOUTH TWICE DAILY WITH FOOD SWALLOWING WHOLE  DO NOT CHEW, OPEN, DISSOVE AND/OR CRUSH  3/25/20   Historical Provider, MD   VITRON-C  MG TABS 2 (two) times a day  4/20/20   Historical Provider, MD     I have reviewed home medications using recent Epic encounter  Allergies: Allergies   Allergen Reactions    Other Swelling     Bee stings       Social History:  Marital Status:     Occupation: None  Patient Pre-hospital Living Situation: [facility]  Patient Pre-hospital Level of Mobility: walks with person assist  Patient Pre-hospital Diet Restrictions: None  Substance Use History:   Social History     Substance and Sexual Activity   Alcohol Use Yes Comment: occ     Social History     Tobacco Use   Smoking Status Former Smoker    Packs/day: 1 50    Years: 20 00    Pack years: 30 00    Quit date: 1996    Years since quittin 6   Smokeless Tobacco Never Used     Social History     Substance and Sexual Activity   Drug Use No       Family History:  No family history on file  Physical Exam:     Vitals:   Blood Pressure: 142/70 (21 1406)  Pulse: 70 (21 1406)  Temperature: 98 8 °F (37 1 °C) (21 140)  Temp Source: Tympanic (21 140)  Respirations: 16 (21 140)  Height: 6' (182 9 cm) (21)  Weight - Scale: 79 kg (174 lb 2 6 oz) (21)  SpO2: 95 % (21)    Physical Exam  Constitutional:       General: He is not in acute distress  Appearance: Normal appearance  He is normal weight  He is ill-appearing  He is not diaphoretic  Interventions: Nasal cannula in place  HENT:      Head: Normocephalic and atraumatic  Mouth/Throat:      Mouth: Mucous membranes are dry  Eyes:      General: No scleral icterus  Pupils: Pupils are equal, round, and reactive to light  Cardiovascular:      Rate and Rhythm: Normal rate and regular rhythm  Pulses: Normal pulses  Heart sounds: Normal heart sounds, S1 normal and S2 normal  No murmur heard  No systolic murmur is present  No diastolic murmur is present  No gallop  No S3 or S4 sounds  Pulmonary:      Effort: Pulmonary effort is normal  No accessory muscle usage or respiratory distress  Breath sounds: No stridor  Examination of the right-lower field reveals decreased breath sounds  Examination of the left-lower field reveals decreased breath sounds  Decreased breath sounds present  No wheezing, rhonchi or rales  Chest:      Chest wall: No tenderness  Abdominal:      General: Bowel sounds are normal  There is no distension  Palpations: Abdomen is soft  Tenderness: There is no abdominal tenderness   There is no guarding  Musculoskeletal:      Right lower leg: No edema  Left lower leg: No edema  Skin:     General: Skin is warm and dry  Coloration: Skin is not jaundiced  Neurological:      General: No focal deficit present  Mental Status: He is easily aroused  Mental status is at baseline  He is lethargic  Motor: No tremor or seizure activity  Psychiatric:         Behavior: Behavior is cooperative  Additional Data:     Lab Results:  Results from last 7 days   Lab Units 09/12/21  1031   WBC Thousand/uL 10 98*   HEMOGLOBIN g/dL 14 1   HEMATOCRIT % 43 4   PLATELETS Thousands/uL 174   BANDS PCT % 11*   LYMPHO PCT % 10*   MONO PCT % 7   EOS PCT % 0     Results from last 7 days   Lab Units 09/12/21  1031   SODIUM mmol/L 139   POTASSIUM mmol/L 4 3   CHLORIDE mmol/L 100   CO2 mmol/L 29   BUN mg/dL 28*   CREATININE mg/dL 1 37*   ANION GAP mmol/L 10   CALCIUM mg/dL 9 0   ALBUMIN g/dL 3 9   TOTAL BILIRUBIN mg/dL 0 77   ALK PHOS U/L 84 0   ALT U/L 14   AST U/L 24   GLUCOSE RANDOM mg/dL 205*                       Imaging: Personally reviewed the following imaging: chest xray  XR chest 1 view portable    (Results Pending)       EKG and Other Studies Reviewed on Admission:   · EKG: No EKG obtained  · CXR: GGO in periphery  No signs of infiltrate     ** Please Note: This note has been constructed using a voice recognition system   **

## 2021-09-12 NOTE — ED NOTES
Wolfgang texted  Charge nurse on ms 3, MADDI Glover to send tech to bring pt to room [address] [place] [address]  09/12/21 3639

## 2021-09-12 NOTE — ASSESSMENT & PLAN NOTE
Lab Results   Component Value Date    HGBA1C 9 5 (H) 08/27/2021       No results for input(s): POCGLU in the last 72 hours  Blood Sugar Average: Last 72 hrs:  · BG elevated on admission   A1c above goal   · Hold Metformin, Starlix   · SSI algorithm with meals and bedtime   · QID accuchecks

## 2021-09-12 NOTE — ASSESSMENT & PLAN NOTE
· POA, patient tested positive for COVID-19 on 9/3  Has been lethargic and hypoxic at nursing home  Presently requiring 3 L NC for adequate saturation  Unable to provide much history   He is vaccinated   · Admit patient to med/surg under inpatient status   · Remdesivir  · Decadron  · Lovenox 1 mg/kg  · Trend CBC, CMP, CRP   · Monitor O2 requirements  · Supportive care

## 2021-09-13 PROBLEM — J96.01 ACUTE RESPIRATORY FAILURE WITH HYPOXIA (HCC): Status: ACTIVE | Noted: 2021-09-12

## 2021-09-13 PROBLEM — N18.32 STAGE 3B CHRONIC KIDNEY DISEASE (HCC): Status: ACTIVE | Noted: 2021-09-13

## 2021-09-13 LAB
ALBUMIN SERPL BCP-MCNC: 3.4 G/DL (ref 3.4–4.8)
ALP SERPL-CCNC: 74.2 U/L (ref 10–129)
ALT SERPL W P-5'-P-CCNC: 10 U/L (ref 5–63)
ANION GAP SERPL CALCULATED.3IONS-SCNC: 11 MMOL/L (ref 4–13)
AST SERPL W P-5'-P-CCNC: 16 U/L (ref 15–41)
BASOPHILS # BLD AUTO: 0.01 THOUSANDS/ΜL (ref 0–0.1)
BASOPHILS NFR BLD AUTO: 0 % (ref 0–1)
BILIRUB SERPL-MCNC: 0.51 MG/DL (ref 0.3–1.2)
BUN SERPL-MCNC: 46 MG/DL (ref 6–20)
CALCIUM ALBUM COR SERPL-MCNC: 9.3 MG/DL (ref 8.3–10.1)
CALCIUM SERPL-MCNC: 8.8 MG/DL (ref 8.4–10.2)
CHLORIDE SERPL-SCNC: 103 MMOL/L (ref 96–108)
CO2 SERPL-SCNC: 26 MMOL/L (ref 22–33)
CREAT SERPL-MCNC: 1.51 MG/DL (ref 0.5–1.2)
CRP SERPL QL: 14.8 MG/DL (ref 0–1)
EOSINOPHIL # BLD AUTO: 0 THOUSAND/ΜL (ref 0–0.61)
EOSINOPHIL NFR BLD AUTO: 0 % (ref 0–6)
ERYTHROCYTE [DISTWIDTH] IN BLOOD BY AUTOMATED COUNT: 14 % (ref 11.6–15.1)
GFR SERPL CREATININE-BSD FRML MDRD: 40 ML/MIN/1.73SQ M
GLUCOSE SERPL-MCNC: 201 MG/DL (ref 65–140)
GLUCOSE SERPL-MCNC: 215 MG/DL (ref 65–140)
GLUCOSE SERPL-MCNC: 292 MG/DL (ref 65–140)
GLUCOSE SERPL-MCNC: 297 MG/DL (ref 65–140)
GLUCOSE SERPL-MCNC: 301 MG/DL (ref 65–140)
HCT VFR BLD AUTO: 39.5 % (ref 36.5–49.3)
HGB BLD-MCNC: 13 G/DL (ref 12–17)
IMM GRANULOCYTES # BLD AUTO: 0.02 THOUSAND/UL (ref 0–0.2)
IMM GRANULOCYTES NFR BLD AUTO: 0 % (ref 0–2)
LYMPHOCYTES # BLD AUTO: 0.75 THOUSANDS/ΜL (ref 0.6–4.47)
LYMPHOCYTES NFR BLD AUTO: 9 % (ref 14–44)
MCH RBC QN AUTO: 29.7 PG (ref 26.8–34.3)
MCHC RBC AUTO-ENTMCNC: 32.9 G/DL (ref 31.4–37.4)
MCV RBC AUTO: 90 FL (ref 82–98)
MONOCYTES # BLD AUTO: 0.6 THOUSAND/ΜL (ref 0.17–1.22)
MONOCYTES NFR BLD AUTO: 7 % (ref 4–12)
NEUTROPHILS # BLD AUTO: 7.32 THOUSANDS/ΜL (ref 1.85–7.62)
NEUTS SEG NFR BLD AUTO: 84 % (ref 43–75)
PLATELET # BLD AUTO: 179 THOUSANDS/UL (ref 149–390)
PMV BLD AUTO: 11.7 FL (ref 8.9–12.7)
POTASSIUM SERPL-SCNC: 4.2 MMOL/L (ref 3.5–5)
PROCALCITONIN SERPL-MCNC: <0.05 NG/ML
PROT SERPL-MCNC: 7 G/DL (ref 6.4–8.3)
RBC # BLD AUTO: 4.37 MILLION/UL (ref 3.88–5.62)
SODIUM SERPL-SCNC: 140 MMOL/L (ref 133–145)
WBC # BLD AUTO: 8.7 THOUSAND/UL (ref 4.31–10.16)

## 2021-09-13 PROCEDURE — 86140 C-REACTIVE PROTEIN: CPT | Performed by: PHYSICIAN ASSISTANT

## 2021-09-13 PROCEDURE — 84145 PROCALCITONIN (PCT): CPT | Performed by: EMERGENCY MEDICINE

## 2021-09-13 PROCEDURE — 99232 SBSQ HOSP IP/OBS MODERATE 35: CPT | Performed by: PHYSICIAN ASSISTANT

## 2021-09-13 PROCEDURE — 80053 COMPREHEN METABOLIC PANEL: CPT | Performed by: PHYSICIAN ASSISTANT

## 2021-09-13 PROCEDURE — 85025 COMPLETE CBC W/AUTO DIFF WBC: CPT | Performed by: PHYSICIAN ASSISTANT

## 2021-09-13 PROCEDURE — 82948 REAGENT STRIP/BLOOD GLUCOSE: CPT

## 2021-09-13 RX ADMIN — INSULIN LISPRO 4 UNITS: 100 INJECTION, SOLUTION INTRAVENOUS; SUBCUTANEOUS at 12:17

## 2021-09-13 RX ADMIN — Medication 2000 UNITS: at 05:12

## 2021-09-13 RX ADMIN — TAMSULOSIN HYDROCHLORIDE 0.4 MG: 0.4 CAPSULE ORAL at 16:55

## 2021-09-13 RX ADMIN — CLOPIDOGREL BISULFATE 75 MG: 75 TABLET ORAL at 21:07

## 2021-09-13 RX ADMIN — INSULIN LISPRO 3 UNITS: 100 INJECTION, SOLUTION INTRAVENOUS; SUBCUTANEOUS at 21:22

## 2021-09-13 RX ADMIN — ENOXAPARIN SODIUM 80 MG: 80 INJECTION SUBCUTANEOUS at 08:12

## 2021-09-13 RX ADMIN — MIDODRINE HYDROCHLORIDE 2.5 MG: 5 TABLET ORAL at 08:13

## 2021-09-13 RX ADMIN — FOLIC ACID TAB 400 MCG 400 MCG: 400 TAB at 05:12

## 2021-09-13 RX ADMIN — INSULIN LISPRO 3 UNITS: 100 INJECTION, SOLUTION INTRAVENOUS; SUBCUTANEOUS at 16:55

## 2021-09-13 RX ADMIN — REMDESIVIR 100 MG: 100 INJECTION, POWDER, LYOPHILIZED, FOR SOLUTION INTRAVENOUS at 14:48

## 2021-09-13 RX ADMIN — ENOXAPARIN SODIUM 80 MG: 80 INJECTION SUBCUTANEOUS at 21:08

## 2021-09-13 RX ADMIN — INSULIN LISPRO 4 UNITS: 100 INJECTION, SOLUTION INTRAVENOUS; SUBCUTANEOUS at 16:54

## 2021-09-13 RX ADMIN — ATORVASTATIN CALCIUM 40 MG: 40 TABLET, FILM COATED ORAL at 21:07

## 2021-09-13 RX ADMIN — MIDODRINE HYDROCHLORIDE 2.5 MG: 5 TABLET ORAL at 21:06

## 2021-09-13 RX ADMIN — LOSARTAN POTASSIUM 50 MG: 50 TABLET, FILM COATED ORAL at 08:13

## 2021-09-13 RX ADMIN — DEXAMETHASONE SODIUM PHOSPHATE 6 MG: 4 INJECTION INTRA-ARTICULAR; INTRALESIONAL; INTRAMUSCULAR; INTRAVENOUS; SOFT TISSUE at 12:16

## 2021-09-13 RX ADMIN — INSULIN LISPRO 2 UNITS: 100 INJECTION, SOLUTION INTRAVENOUS; SUBCUTANEOUS at 08:12

## 2021-09-13 RX ADMIN — DONEPEZIL HYDROCHLORIDE 10 MG: 5 TABLET, FILM COATED ORAL at 21:07

## 2021-09-13 RX ADMIN — MIDODRINE HYDROCHLORIDE 2.5 MG: 5 TABLET ORAL at 16:55

## 2021-09-13 NOTE — PROGRESS NOTES
Tima U  66   Progress Note - Lebron Duran 12/3/1930, 80 y o  male MRN: [id number]  Unit/Bed#: -Chava Encounter: [id number]  Primary Care Provider: Stevenson Alvarez DO   Date and time admitted to hospital: 9/12/2021 10:18 AM    * COVID-19  Assessment & Plan  · POA, patient tested positive for COVID-19 on 9/3  Has been lethargic and hypoxic at nursing home  Presently requiring 3 L NC for adequate saturation  Unable to provide much history  He is vaccinated   · Admit patient to med/surg under inpatient status   · Remdesivir day 2/5  · Decadron day 2/10  · Lovenox 1 mg/kg for therapeutic anticoagulation   · Trend CBC, CMP, CRP   · Monitor O2 requirements  · Supportive care     Hypoxia  Assessment & Plan  · Patient without signs of respiratory failure, but requiring 3 L NC to maintain adequate saturations  Secondary to COVID-19   · Still on 3 liters/min saturating at 90% SpO2  · Attempt to wean off oxygen as able  · Continue plan as above     Stage 3b chronic kidney disease [facility])  Assessment & Plan  Lab Results   Component Value Date    EGFR 40 09/13/2021    EGFR 45 09/12/2021    EGFR 41 09/03/2021    CREATININE 1 51 (H) 09/13/2021    CREATININE 1 37 (H) 09/12/2021    CREATININE 1 47 (H) 09/03/2021     · Baseline creatinine appears to be between 1 4-1 5  · Continue to monitor    Dementia [facility])  Assessment & Plan  · Unable to provide much history at baseline   · Continue Aricept  · Supportive care     Type 2 diabetes mellitus, without long-term current use of insulin [facility])  Assessment & Plan  Lab Results   Component Value Date    HGBA1C 9 5 (H) 08/27/2021       Recent Labs     09/12/21  1606 09/12/21  2123 09/13/21  0705 09/13/21  1147   POCGLU 212* 284* 201* 301*       Blood Sugar Average: Last 72 hrs:  · (P) 249  5BG elevated on admission   A1c above goal   · Hold Metformin, Starlix   · SSI algorithm with meals and bedtime   · Glucose currently uncontrolled- Patient currently on IV dexamethasone- will add standing insulin TID- adjust as needed once off steroids to avoid hypoglycemia   · QID accuchecks    Essential hypertension  Assessment & Plan  · BP acceptable currently   · Continue Midodrine TID, Losartan with hold parameters           VTE Pharmacologic Prophylaxis: VTE Score: 6 High Risk (Score >/= 5) - Pharmacological DVT Prophylaxis Ordered: enoxaparin (Lovenox)  Sequential Compression Devices Ordered  Patient Centered Rounds: I performed bedside rounds with nursing staff today  Discussions with Specialists or Other Care Team Provider: none     Education and Discussions with Family / Patient: Attempted to update  (daughter in law) via phone  Left voicemail  Time Spent for Care: 30 minutes  More than 50% of total time spent on counseling and coordination of care as described above  Current Length of Stay: 1 day(s)  Current Patient Status: Inpatient   Certification Statement: The patient will continue to require additional inpatient hospital stay due to covid 19 pneumonia   Discharge Plan: Anticipate discharge in 48 hrs to pending rehab facility     Code Status: Level 3 - DNAR and DNI    Subjective:   Patient was resting in bed, is very hard of hearing however was awake and responding to my questions  States that he has not had much of an appetite  Denies any pain  No complaints at this time  Objective:     Vitals:   Temp (24hrs), Av 8 °F (36 6 °C), Min:96 9 °F (36 1 °C), Max:99 1 °F (37 3 °C)    Temp:  [96 9 °F (36 1 °C)-99 1 °F (37 3 °C)] 96 9 °F (36 1 °C)  HR:  [74-80] 80  Resp:  [18-20] 20  BP: (113-139)/(64-79) 122/66  SpO2:  [90 %-95 %] 91 %  Body mass index is 23 62 kg/m²  Input and Output Summary (last 24 hours):   No intake or output data in the 24 hours ending 21 1505    Physical Exam:   Physical Exam  HENT:      Mouth/Throat:      Mouth: Mucous membranes are dry  Eyes:      General: No scleral icterus    Cardiovascular:      Rate and Rhythm: Normal rate and regular rhythm  Pulses: Normal pulses  Heart sounds: Normal heart sounds  Pulmonary:      Effort: Pulmonary effort is normal  No respiratory distress  Breath sounds: Normal breath sounds  No wheezing, rhonchi or rales  Abdominal:      General: Abdomen is flat  Bowel sounds are normal       Palpations: Abdomen is soft  Tenderness: There is no abdominal tenderness  Musculoskeletal:         General: No swelling  Right lower leg: No edema  Left lower leg: No edema  Skin:     General: Skin is warm  Capillary Refill: Capillary refill takes less than 2 seconds  Neurological:      Mental Status: He is alert  Mental status is at baseline  Comments: Hard of hearing           Additional Data:     Labs:  Results from last 7 days   Lab Units 09/13/21  0520 09/12/21  1031   WBC Thousand/uL 8 70 10 98*   HEMOGLOBIN g/dL 13 0 14 1   HEMATOCRIT % 39 5 43 4   PLATELETS Thousands/uL 179 174   BANDS PCT %  --  11*   NEUTROS PCT % 84*  --    LYMPHS PCT % 9*  --    LYMPHO PCT %  --  10*   MONOS PCT % 7  --    MONO PCT %  --  7   EOS PCT % 0 0     Results from last 7 days   Lab Units 09/13/21  0521   SODIUM mmol/L 140   POTASSIUM mmol/L 4 2   CHLORIDE mmol/L 103   CO2 mmol/L 26   BUN mg/dL 46*   CREATININE mg/dL 1 51*   ANION GAP mmol/L 11   CALCIUM mg/dL 8 8   ALBUMIN g/dL 3 4   TOTAL BILIRUBIN mg/dL 0 51   ALK PHOS U/L 74 2   ALT U/L 10   AST U/L 16   GLUCOSE RANDOM mg/dL 215*         Results from last 7 days   Lab Units 09/13/21  1147 09/13/21  0705 09/12/21  2123 09/12/21  1606   POC GLUCOSE mg/dl 301* 201* 284* 212*         Results from last 7 days   Lab Units 09/13/21  0521 09/12/21  1031   PROCALCITONIN ng/ml <0 05 0 07       Lines/Drains:  Invasive Devices     Peripheral Intravenous Line            Peripheral IV 09/12/21 Right;Ventral (anterior) Forearm 1 day                      Imaging: No pertinent imaging reviewed      Recent Cultures (last 7 days): Last 24 Hours Medication List:   Current Facility-Administered Medications   Medication Dose Route Frequency Provider Last Rate    acetaminophen  650 mg Oral Q6H PRN Az Lowe, ALISTAIR      atorvastatin  40 mg Oral HS Az Lowe, ALISTAIR      cholecalciferol  2,000 Units Oral Early Morning [facility] Shell, ALISTAIR      clopidogrel  75 mg Oral HS [facility] Shell, ALISTAIR      dexamethasone  6 mg Intravenous Q24H [facility], ALISTAIR      donepezil  10 mg Oral HS [facility], ALISTAIR      enoxaparin  1 mg/kg Subcutaneous Q12H [address] [facility], ALISTAIR      folic acid  679 mcg Oral Early Morning [facility] Shell, ALISTAIR      insulin lispro  1-5 Units Subcutaneous HS [facility], ALISTAIR      insulin lispro  1-6 Units Subcutaneous TID AC [facility], ALISTAIR      insulin lispro  3 Units Subcutaneous TID With Meals Lona Arce PA-C      losartan  50 mg Oral Daily [facility], ALISTAIR      midodrine  2 5 mg Oral TID Mak Craven, ALISTAIR      ondansetron  4 mg Intravenous Q6H PRN Az Lowmeghana, ALISTAIR      remdesivir  100 mg Intravenous Q24H Mak Craven, ALISTAIR      tamsulosin  0 4 mg Oral Daily With Dinner Mak Odilia Shellshawn, ALISTAIR          Today, Patient Was Seen By: Lona Arce PA-C    **Please Note: This note may have been constructed using a voice recognition system  **

## 2021-09-13 NOTE — ASSESSMENT & PLAN NOTE
Lab Results   Component Value Date    EGFR 40 09/13/2021    EGFR 45 09/12/2021    EGFR 41 09/03/2021    CREATININE 1 51 (H) 09/13/2021    CREATININE 1 37 (H) 09/12/2021    CREATININE 1 47 (H) 09/03/2021     · Baseline creatinine appears to be between 1 4-1 5  · Continue to monitor

## 2021-09-13 NOTE — PLAN OF CARE
Problem: Prexisting or High Potential for Compromised Skin Integrity  Goal: Skin integrity is maintained or improved  Description: INTERVENTIONS:  - Identify patients at risk for skin breakdown  - Assess and monitor skin integrity  - Assess and monitor nutrition and hydration status  - Monitor labs   - Assess for incontinence   - Turn and reposition patient  - Assist with mobility/ambulation  - Relieve pressure over bony prominences  - Avoid friction and shearing  - Provide appropriate hygiene as needed including keeping skin clean and dry  - Evaluate need for skin moisturizer/barrier cream  - Collaborate with interdisciplinary team   - Patient/family teaching  - Consider wound care consult   Outcome: Progressing     Problem: Potential for Falls  Goal: Patient will remain free of falls  Description: INTERVENTIONS:  - Educate patient/family on patient safety including physical limitations  - Instruct patient to call for assistance with activity   - Consult OT/PT to assist with strengthening/mobility   - Keep Call bell within reach  - Keep bed low and locked with side rails adjusted as appropriate  - Keep care items and personal belongings within reach  - Initiate and maintain comfort rounds  - Make Fall Risk Sign visible to staff  - Offer Toileting every  Hours, in advance of need  - Initiate/Maintain alarm  - Obtain necessary fall risk management equipment:   - Apply yellow socks and bracelet for high fall risk patients  - Consider moving patient to room near nurses station  Outcome: Progressing     Problem: MOBILITY - ADULT  Goal: Maintain or return to baseline ADL function  Description: INTERVENTIONS:  -  Assess patient's ability to carry out ADLs; assess patient's baseline for ADL function and identify physical deficits which impact ability to perform ADLs (bathing, care of mouth/teeth, toileting, grooming, dressing, etc )  - Assess/evaluate cause of self-care deficits   - Assess range of motion  - Assess patient's mobility; develop plan if impaired  - Assess patient's need for assistive devices and provide as appropriate  - Encourage maximum independence but intervene and supervise when necessary  - Involve family in performance of ADLs  - Assess for home care needs following discharge   - Consider OT consult to assist with ADL evaluation and planning for discharge  - Provide patient education as appropriate  Outcome: Progressing  Goal: Maintains/Returns to pre admission functional level  Description: INTERVENTIONS:  - Perform BMAT or MOVE assessment daily    - Set and communicate daily mobility goal to care team and patient/family/caregiver  - Collaborate with rehabilitation services on mobility goals if consulted  - Perform Range of Motion  times a day  - Reposition patient every  hours    - Dangle patient  times a day  - Stand patient  times a day  - Ambulate patient  times a day  - Out of bed to chair  times a day   - Out of bed for meals  times a day  - Out of bed for toileting  - Record patient progress and toleration of activity level   Outcome: Progressing     Problem: INFECTION - ADULT  Goal: Absence or prevention of progression during hospitalization  Description: INTERVENTIONS:  - Assess and monitor for signs and symptoms of infection  - Monitor lab/diagnostic results  - Monitor all insertion sites, i e  indwelling lines, tubes, and drains  - Monitor endotracheal if appropriate and nasal secretions for changes in amount and color  - [place] appropriate cooling/warming therapies per order  - Administer medications as ordered  - Instruct and encourage patient and family to use good hand hygiene technique  - Identify and instruct in appropriate isolation precautions for identified infection/condition  Outcome: Progressing  Goal: Absence of fever/infection during neutropenic period  Description: INTERVENTIONS:  - Monitor WBC    Outcome: Progressing     Problem: SAFETY ADULT  Goal: Patient will remain free of falls  Description: INTERVENTIONS:  - Educate patient/family on patient safety including physical limitations  - Instruct patient to call for assistance with activity   - Consult OT/PT to assist with strengthening/mobility   - Keep Call bell within reach  - Keep bed low and locked with side rails adjusted as appropriate  - Keep care items and personal belongings within reach  - Initiate and maintain comfort rounds  - Make Fall Risk Sign visible to staff  - Offer Toileting every  Hours, in advance of need  - Initiate/Maintain alarm  - Obtain necessary fall risk management equipment:   - Apply yellow socks and bracelet for high fall risk patients  - Consider moving patient to room near nurses station  Outcome: Progressing  Goal: Maintain or return to baseline ADL function  Description: INTERVENTIONS:  -  Assess patient's ability to carry out ADLs; assess patient's baseline for ADL function and identify physical deficits which impact ability to perform ADLs (bathing, care of mouth/teeth, toileting, grooming, dressing, etc )  - Assess/evaluate cause of self-care deficits   - Assess range of motion  - Assess patient's mobility; develop plan if impaired  - Assess patient's need for assistive devices and provide as appropriate  - Encourage maximum independence but intervene and supervise when necessary  - Involve family in performance of ADLs  - Assess for home care needs following discharge   - Consider OT consult to assist with ADL evaluation and planning for discharge  - Provide patient education as appropriate  Outcome: Progressing  Goal: Maintains/Returns to pre admission functional level  Description: INTERVENTIONS:  - Perform BMAT or MOVE assessment daily    - Set and communicate daily mobility goal to care team and patient/family/caregiver  - Collaborate with rehabilitation services on mobility goals if consulted  - Perform Range of Motion  times a day  - Reposition patient every  hours    - Dangle patient times a day  - Stand patient  times a day  - Ambulate patient  times a day  - Out of bed to chair  times a day   - Out of bed for meals  times a day  - Out of bed for toileting  - Record patient progress and toleration of activity level   Outcome: Progressing     Problem: DISCHARGE PLANNING  Goal: Discharge to home or other facility with appropriate resources  Description: INTERVENTIONS:  - Identify barriers to discharge w/patient and caregiver  - Arrange for needed discharge resources and transportation as appropriate  - Identify discharge learning needs (meds, wound care, etc )  - Arrange for interpretive services to assist at discharge as needed  - Refer to Case Management Department for coordinating discharge planning if the patient needs post-hospital services based on physician/advanced practitioner order or complex needs related to functional status, cognitive ability, or social support system  Outcome: Progressing     Problem: Knowledge Deficit  Goal: Patient/family/caregiver demonstrates understanding of disease process, treatment plan, medications, and discharge instructions  Description: Complete learning assessment and assess knowledge base  Interventions:  - Provide teaching at level of understanding  - Provide teaching via preferred learning methods  Outcome: Progressing     Problem: Nutrition/Hydration-ADULT  Goal: Nutrient/Hydration intake appropriate for improving, restoring or maintaining nutritional needs  Description: Monitor and assess patient's nutrition/hydration status for malnutrition  Collaborate with interdisciplinary team and initiate plan and interventions as ordered  Monitor patient's weight and dietary intake as ordered or per policy  Utilize nutrition screening tool and intervene as necessary  Determine patient's food preferences and provide high-protein, high-caloric foods as appropriate       INTERVENTIONS:  - Monitor oral intake, urinary output, labs, and treatment plans  - Assess nutrition and hydration status and recommend course of action  - Evaluate amount of meals eaten  - Assist patient with eating if necessary   - Allow adequate time for meals  - Recommend/ encourage appropriate diets, oral nutritional supplements, and vitamin/mineral supplements  - Order, calculate, and assess calorie counts as needed  - Recommend, monitor, and adjust tube feedings and TPN/PPN based on assessed needs  - Assess need for intravenous fluids  - Provide specific nutrition/hydration education as appropriate  - Include patient/family/caregiver in decisions related to nutrition  Outcome: Progressing

## 2021-09-13 NOTE — ASSESSMENT & PLAN NOTE
· Patient without signs of respiratory failure, but requiring 3 L NC to maintain adequate saturations   Secondary to COVID-19   · Still on 3 liters/min saturating at 90% SpO2  · Attempt to wean off oxygen as able  · Continue plan as above

## 2021-09-13 NOTE — ASSESSMENT & PLAN NOTE
· POA, patient tested positive for COVID-19 on 9/3  Has been lethargic and hypoxic at nursing home  Presently requiring 3 L NC for adequate saturation  Unable to provide much history   He is vaccinated   · Admit patient to med/surg under inpatient status   · Remdesivir day 2/5  · Decadron day 2/10  · Lovenox 1 mg/kg for therapeutic anticoagulation   · Trend CBC, CMP, CRP   · Monitor O2 requirements  · Supportive care

## 2021-09-13 NOTE — ASSESSMENT & PLAN NOTE
Lab Results   Component Value Date    HGBA1C 9 5 (H) 08/27/2021       Recent Labs     09/12/21  1606 09/12/21  2123 09/13/21  0705 09/13/21  1147   POCGLU 212* 284* 201* 301*       Blood Sugar Average: Last 72 hrs:  · (P) 249  5BG elevated on admission   A1c above goal   · Hold Metformin, Starlix   · SSI algorithm with meals and bedtime   · Glucose currently uncontrolled- Patient currently on IV dexamethasone- will add standing insulin TID- adjust as needed once off steroids to avoid hypoglycemia   · QID accuchecks

## 2021-09-14 PROBLEM — R06.89 ACUTE RESPIRATORY INSUFFICIENCY: Status: ACTIVE | Noted: 2021-09-12

## 2021-09-14 LAB
ALBUMIN SERPL BCP-MCNC: 3.2 G/DL (ref 3.4–4.8)
ALP SERPL-CCNC: 70.1 U/L (ref 10–129)
ALT SERPL W P-5'-P-CCNC: 10 U/L (ref 5–63)
ANION GAP SERPL CALCULATED.3IONS-SCNC: 10 MMOL/L (ref 4–13)
AST SERPL W P-5'-P-CCNC: 14 U/L (ref 15–41)
BILIRUB SERPL-MCNC: 0.44 MG/DL (ref 0.3–1.2)
BUN SERPL-MCNC: 60 MG/DL (ref 6–20)
CALCIUM ALBUM COR SERPL-MCNC: 9.3 MG/DL (ref 8.3–10.1)
CALCIUM SERPL-MCNC: 8.7 MG/DL (ref 8.4–10.2)
CHLORIDE SERPL-SCNC: 104 MMOL/L (ref 96–108)
CO2 SERPL-SCNC: 27 MMOL/L (ref 22–33)
CREAT SERPL-MCNC: 1.53 MG/DL (ref 0.5–1.2)
CRP SERPL QL: 9.5 MG/DL (ref 0–1)
ERYTHROCYTE [DISTWIDTH] IN BLOOD BY AUTOMATED COUNT: 14 % (ref 11.6–15.1)
GFR SERPL CREATININE-BSD FRML MDRD: 39 ML/MIN/1.73SQ M
GLUCOSE SERPL-MCNC: 270 MG/DL (ref 65–140)
GLUCOSE SERPL-MCNC: 272 MG/DL (ref 65–140)
GLUCOSE SERPL-MCNC: 314 MG/DL (ref 65–140)
GLUCOSE SERPL-MCNC: 351 MG/DL (ref 65–140)
GLUCOSE SERPL-MCNC: 366 MG/DL (ref 65–140)
GLUCOSE SERPL-MCNC: 417 MG/DL (ref 65–140)
HCT VFR BLD AUTO: 40 % (ref 36.5–49.3)
HGB BLD-MCNC: 12.8 G/DL (ref 12–17)
MCH RBC QN AUTO: 28.8 PG (ref 26.8–34.3)
MCHC RBC AUTO-ENTMCNC: 32 G/DL (ref 31.4–37.4)
MCV RBC AUTO: 90 FL (ref 82–98)
PLATELET # BLD AUTO: 200 THOUSANDS/UL (ref 149–390)
PMV BLD AUTO: 11.8 FL (ref 8.9–12.7)
POTASSIUM SERPL-SCNC: 4.2 MMOL/L (ref 3.5–5)
PROT SERPL-MCNC: 6.9 G/DL (ref 6.4–8.3)
RBC # BLD AUTO: 4.45 MILLION/UL (ref 3.88–5.62)
SODIUM SERPL-SCNC: 141 MMOL/L (ref 133–145)
WBC # BLD AUTO: 14.49 THOUSAND/UL (ref 4.31–10.16)

## 2021-09-14 PROCEDURE — 99232 SBSQ HOSP IP/OBS MODERATE 35: CPT | Performed by: NURSE PRACTITIONER

## 2021-09-14 PROCEDURE — 85027 COMPLETE CBC AUTOMATED: CPT | Performed by: PHYSICIAN ASSISTANT

## 2021-09-14 PROCEDURE — 97163 PT EVAL HIGH COMPLEX 45 MIN: CPT

## 2021-09-14 PROCEDURE — 86140 C-REACTIVE PROTEIN: CPT | Performed by: PHYSICIAN ASSISTANT

## 2021-09-14 PROCEDURE — 80053 COMPREHEN METABOLIC PANEL: CPT | Performed by: PHYSICIAN ASSISTANT

## 2021-09-14 PROCEDURE — 82948 REAGENT STRIP/BLOOD GLUCOSE: CPT

## 2021-09-14 RX ORDER — INSULIN GLARGINE 100 [IU]/ML
12 INJECTION, SOLUTION SUBCUTANEOUS
Status: DISCONTINUED | OUTPATIENT
Start: 2021-09-14 | End: 2021-09-16 | Stop reason: HOSPADM

## 2021-09-14 RX ORDER — METOPROLOL SUCCINATE 25 MG/1
25 TABLET, EXTENDED RELEASE ORAL DAILY
Status: DISCONTINUED | OUTPATIENT
Start: 2021-09-14 | End: 2021-09-16 | Stop reason: HOSPADM

## 2021-09-14 RX ADMIN — MIDODRINE HYDROCHLORIDE 2.5 MG: 5 TABLET ORAL at 16:43

## 2021-09-14 RX ADMIN — ATORVASTATIN CALCIUM 40 MG: 40 TABLET, FILM COATED ORAL at 21:46

## 2021-09-14 RX ADMIN — INSULIN GLARGINE 12 UNITS: 100 INJECTION, SOLUTION SUBCUTANEOUS at 21:52

## 2021-09-14 RX ADMIN — CLOPIDOGREL BISULFATE 75 MG: 75 TABLET ORAL at 21:46

## 2021-09-14 RX ADMIN — DONEPEZIL HYDROCHLORIDE 10 MG: 5 TABLET, FILM COATED ORAL at 21:45

## 2021-09-14 RX ADMIN — MIDODRINE HYDROCHLORIDE 2.5 MG: 5 TABLET ORAL at 21:46

## 2021-09-14 RX ADMIN — MIDODRINE HYDROCHLORIDE 2.5 MG: 5 TABLET ORAL at 09:04

## 2021-09-14 RX ADMIN — METOPROLOL SUCCINATE 25 MG: 25 TABLET, EXTENDED RELEASE ORAL at 16:43

## 2021-09-14 RX ADMIN — LOSARTAN POTASSIUM 50 MG: 50 TABLET, FILM COATED ORAL at 09:04

## 2021-09-14 RX ADMIN — DEXAMETHASONE SODIUM PHOSPHATE 6 MG: 4 INJECTION INTRA-ARTICULAR; INTRALESIONAL; INTRAMUSCULAR; INTRAVENOUS; SOFT TISSUE at 09:08

## 2021-09-14 RX ADMIN — ENOXAPARIN SODIUM 80 MG: 80 INJECTION SUBCUTANEOUS at 21:47

## 2021-09-14 RX ADMIN — Medication 2000 UNITS: at 05:28

## 2021-09-14 RX ADMIN — ENOXAPARIN SODIUM 80 MG: 80 INJECTION SUBCUTANEOUS at 09:04

## 2021-09-14 RX ADMIN — INSULIN LISPRO 5 UNITS: 100 INJECTION, SOLUTION INTRAVENOUS; SUBCUTANEOUS at 16:42

## 2021-09-14 RX ADMIN — INSULIN LISPRO 5 UNITS: 100 INJECTION, SOLUTION INTRAVENOUS; SUBCUTANEOUS at 11:33

## 2021-09-14 RX ADMIN — FOLIC ACID TAB 400 MCG 400 MCG: 400 TAB at 05:28

## 2021-09-14 RX ADMIN — INSULIN LISPRO 3 UNITS: 100 INJECTION, SOLUTION INTRAVENOUS; SUBCUTANEOUS at 11:33

## 2021-09-14 RX ADMIN — TAMSULOSIN HYDROCHLORIDE 0.4 MG: 0.4 CAPSULE ORAL at 16:43

## 2021-09-14 RX ADMIN — REMDESIVIR 100 MG: 100 INJECTION, POWDER, LYOPHILIZED, FOR SOLUTION INTRAVENOUS at 15:00

## 2021-09-14 RX ADMIN — INSULIN LISPRO 3 UNITS: 100 INJECTION, SOLUTION INTRAVENOUS; SUBCUTANEOUS at 09:05

## 2021-09-14 RX ADMIN — INSULIN LISPRO 6 UNITS: 100 INJECTION, SOLUTION INTRAVENOUS; SUBCUTANEOUS at 16:44

## 2021-09-14 RX ADMIN — INSULIN LISPRO 4 UNITS: 100 INJECTION, SOLUTION INTRAVENOUS; SUBCUTANEOUS at 09:05

## 2021-09-14 RX ADMIN — INSULIN LISPRO 5 UNITS: 100 INJECTION, SOLUTION INTRAVENOUS; SUBCUTANEOUS at 21:55

## 2021-09-14 NOTE — PHYSICAL THERAPY NOTE
PHYSICAL THERAPY EVALUATION    NAME:  Deepti Painting  DATE: 09/14/21    AGE:   80 y o  Mrn:   [id number]  Length Of Stay: 2    ADMIT DX:  Hypoxia [R09 02]  Known medical problems [Z78 9]  COVID-19 [U07 1]    Past Medical History:   Diagnosis Date    Anemia     getting venofer once a week   Arthritis     Cancer [facility] [address] [facility]     NOSE    Coronary artery disease     carotid stents bilateral    Diabetes mellitus ([facility] [address] )     Hearing aid worn     BILATERAL    History of transfusion 10/11/2015    Hyperlipidemia     Hypertension     Spinal stenosis     hx of L-ROHAN     Past Surgical History:   Procedure Laterality Date    APPENDECTOMY      CAROTID STENT Bilateral     CATARACT EXTRACTION W/ INTRAOCULAR LENS IMPLANT Left 2013    CHOLECYSTECTOMY      CYSTOSCOPY W/ URETERAL STENT PLACEMENT Right     CYSTOSCOPY W/ URETERAL STENT REMOVAL      FEMORAL ARTERY STENT  2013    HEMORRHOID SURGERY      HERNIA REPAIR      HIP ARTHROPLASTY Left 2013    JOINT REPLACEMENT Left 2012    [place] TOTAL HIP ARTHROPLASTY Right 2/1/2016    Procedure: ARTHROPLASTY HIP TOTAL;  Surgeon: Nidia Caceres MD;  Location: [address] [place] [address];  Service: Orthopedics    SKIN BIOPSY      removal of skin cancer nose    TONSILLECTOMY         Performed at least 2 patient identifiers during session: Name and ID bracelet        09/14/21 1340   PT Last Visit   PT Visit Date 09/14/21   Note Type   Note type Evaluation   Pain Assessment   Pain Assessment Tool 0-10   Pain Score No Pain   Hospital Pain Intervention(s) Repositioned   Multiple Pain Sites No   Home Living   Type of Home Assisted living  (Per EMR, pt lives at [address] [place] [address])   Additional Comments Social history very difficult to obtain due to pt's severe hearing impairment in combination with impaired cognition  Per EMR: pt experienced TIA in Sept 2020 and was d/c'd to STR   Pt is now a resident of [facility], is reported to be mobile (unable to determine if w/ or w/o use of AD), requires assistance for all mobility and ADL/IADLs  Prior Function   Level of [place] Needs assistance with ADLs and functional mobility; Needs assistance with IADLs   Lives With Facility staff   Receives Help From Personal care attendant   ADL Assistance Needs assistance   IADLs Needs assistance   Falls in the last 6 months   (unknown )   Vocational Retired   Restrictions/Precautions   Wells David Bearing Precautions Per Order No   Other Precautions Contact/isolation; Airborne/isolation;Droplet precautions; Bed Alarm;Cognitive;Multiple lines;Telemetry; Fall Risk;Hard of hearing;O2  (+COVID-19)   General   Additional Pertinent History Pt admitted 9/12/21 from [address] [place] [address] 37 with known dx of COVID-19, hypoxia, less mobile compared to his norm  Family/Caregiver Present No   Cognition   Overall Cognitive Status Impaired   Arousal/Participation Lethargic   Orientation Level Oriented to person;Disoriented to place; Disoriented to time;Disoriented to situation   Memory Decreased recall of recent events;Decreased recall of precautions;Decreased short term memory   Following Commands Follows one step commands inconsistently  (impaired command following d/t impaired cognition & hearing)   RUE Assessment   RUE Assessment WFL   RUE Overall AROM   R Mass Grasp WFL   RUE Strength   RUE Overall Strength Within Functional Limits - able to perform ADL tasks with strength   LUE Assessment   LUE Assessment WFL   LUE Overall AROM   L Mass Grasp WFL   LUE Strength   LUE Overall Strength Within Functional Limits - able to perform ADL tasks with strength   RLE Assessment   RLE Assessment X   Strength RLE   RLE Overall Strength 3/5   LLE Assessment   LLE Assessment X   Strength LLE   LLE Overall Strength 3-/5   Coordination   Movements are Fluid and Coordinated 1  (Segmental, non fluid movements)   Bed Mobility   Rolling R 2  Maximal assistance   Additional items Assist x 1;Bedrails; Increased time required;Verbal cues  (trunk management, leg management)   Rolling L 2  Maximal assistance   Additional items Assist x 1;Bedrails; Increased time required;Verbal cues  (trunk management, leg management)   Additional Comments Pt refused further bed mobility/EOB attempt or OOB attempt  Pt unable to maintain either L or R sidelying w/o MAXA from therapist  Recommend 2 person assist for all mobility including bed mobility  Transfers   Sit to Stand Unable to assess   Stand to Sit Unable to assess   Stand pivot Unable to assess   Additional Comments Pt refused attempt at transfer evaluation  Ambulation/Elevation   Gait Assistance Not tested  (patient refused attempt at ambulation evaluation)   Curbs Patient refuses all additional mobility, pt stating "I really don't want to walk today, I would like to lay here and rest a little while longer  I know you're trying to help  But I don't want it "    Endurance Deficit   Endurance Deficit Yes   Activity Tolerance   Activity Tolerance Patient limited by fatigue; Other (Comment)  (limited by impaired cognition)   Medical Staff Made Aware Spoke with SLIM   Nurse Made Aware Spoke with MADDI Bernstein pre/post session   Assessment   Prognosis Fair   Problem List Decreased strength;Decreased range of motion;Decreased endurance; Impaired balance;Decreased mobility; Decreased cognition;Decreased safety awareness; Impaired hearing   Assessment Pt seen for PT evaluation, cleared by MADDI Bernstein, activity orders of "ambulate patient"  Pt admitted 9/12/2021 with hypoxia and decreased level of mobility, dx COVID-19  Comorbidities affecting pt's fnxl performance include: anemia, arthritis, CAD, cancer, dementia, DM, HTN, HLD, spinal stenosis, B ARNULFO  Personal factors affecting pt at time of PT IE include: communication issues, decreased cognition, hearing impairments and inability to perform physical activity  PTA, pt was an assisted living resident, unknown level of mobility, requires assist prn   Pt demonstrates fnxl impairments identified in objective findings from Elderly Mobility Scale assessment, scoring 0/20, indicating full dependency on others for completion of ADLs & mobility  Pt scores 10/24 on Low Function AM-PAC objective tool w/ a standardized score of 14 65, indicating need for extensive rehab services upon d/c from the acute care setting  The Barthel Index outcome tool was used & pt scores 10 indicating total limitations of fnxl mobility/ADLS  Pt is at risk of falls d/t multiple comorbidities, impaired balance, impaired cognition, impaired insight/safety awareness, advanced age, acuity of medical illness, ongoing medical treatment of primary dx, newly required supplemental O2/tubing  Pt's clinical presentation is currently unstable/unpredictable seen in pt's presentation of vital sign response, varying levels of cognitive performance, increased fall risk, new onset of impairment of functional mobility, decreased endurance and new onset of weakness  This PT IE requires high complexity clinical decision making, based on multiple medical/physiological/fnxl/social factors which affect pt's performance w/ evaluation & therapist judgement for disposition recommendations  Pt will benefit from continued PT treatment in order to address impairments, decrease risk of falls, maximize independence w/ fnxl mobility, & ensure safety w/ mobility for transition to next level of care  Based on pt presentation & impairments, pt would most appropriately benefit from post acute STR prior to return to [facility]  Goals   Patient Goals "I would like to lay here and rest for a little while longer "   STG Expiration Date 09/24/21   Short Term Goal #1 Patient PT goals established in order to improve patient independence with mobility and decrease burden of care  1  Pt will complete all bed mobility in hospital bed with SHANTELL 1 person, in order to promote increased OOB functional mobility to improve overall activity tolerance     2  Pt will complete all transfers with RW and MODA 1person, in order to increase safety with functional mobility  3  Pt will complete supported standing pre-gait activities with RW and MODA, in order to demonstrate readiness for ambulation training (PT to create ambulation goal as appropriate)  4  Pt will improve B LE strength to >/= 4-/5 MMT throughout, in order to increase safety with functional mobility and decrease risk of falls  5  Pt will improve Low Function AM-PAC score to >/= 15/24 in order to increase independence with mobility and decrease burden of care  6  Pt will improve Barthel Index score to >/= 20/100 in order to increase independence and decrease risk of falls  7  Pt will improve Elderly Mobility Scale score to >/= 4/20 in order to decrease burden of care and increase independence with functional mobility and ADLs  8  Pt will improve static sitting balance to >/= GOOD grade in order to promote safety and increased independence with mobility  9  Pt will tolerate >3hrs OOB in upright position, in order to improve muscular endurance and respiratory status  PT Treatment Day 0   Plan   Treatment/Interventions Functional transfer training;LE strengthening/ROM; Therapeutic exercise; Endurance training;Cognitive reorientation;Patient/family training;Equipment eval/education; Bed mobility;Gait training;Continued evaluation;Spoke to MD;Spoke to nursing;OT   PT Frequency Other (Comment)  (3-5x/wk)   Recommendation   PT Discharge Recommendation Post acute rehabilitation services  (pt will likely benefit from STR prior to return to [facility])   AM-PAC Basic Mobility Inpatient   Turning in Bed Without Bedrails 1   Lying on Back to Sitting on Edge of Flat Bed 1   Moving Bed to Chair 1   Standing Up From Chair 1   Walk in Room 1   Climb 3-5 Stairs 1   Basic Mobility Inpatient Raw Score 6   Turning Head Towards Sound 2   Follow Simple Instructions 2   Low Function Basic Mobility Raw Score 10   Low Function Basic Mobility Standardized Score 14 65   Modified Davidson Scale   Modified Dioni Scale 5   Barthel Index   Feeding 5   Bathing 0   Grooming Score 0   Dressing Score 0   Bladder Score 0   Bowels Score 5   Toilet Use Score 0   Transfers (Bed/Chair) Score 0   Mobility (Level Surface) Score 0   Stairs Score 0   Barthel Index Score 10       The patient's AM-PAC Basic Mobility Inpatient Short Form Low Function Raw Score 10 , Standardized Score is 14 65  A standardized score less 42 9 suggests the patient may benefit from discharge to post-acute rehab services  Please also refer to the recommendation of the Physical Therapist for safe discharge planning  ELDERLY MOBILITY SCALE OUTCOME MEASURE:   Older Adult & Geriatric Care: (Nikky Santo; n=36; age= 70-93)  Level of independence and EMS scores:   Score > 14 = independent in basic ADLs and safe for independent mobility maneuvers (with or without device)   Score 10-13 = borderline in terms of safe mobility and independence in ADLs (require some help with ADLs and mobility maneuvers)    Score < 10 = dependent or high degree of dependency for basic ADLs and limited mobility maneuvers (such as transfers, toileting, dressing)  Discharge recommendations and EMS scores:   Score 0-6 = post acute STR / SNF / IRF   Score 7-13 = home with high levels of care - ie: skilled caretaker, community resources, family asssistance   Score 14-20 = home   Please refer to therapist full assessment & documentation for most appropriate recommendation and details for discharge         Rea Bundy PT, DPT   Available via [facility]  NPI # [id number]  [place] License - [id number]  2/[id number]

## 2021-09-14 NOTE — ASSESSMENT & PLAN NOTE
Lab Results   Component Value Date    HGBA1C 9 5 (H) 08/27/2021       Recent Labs     09/13/21  1544 09/13/21  2118 09/14/21  0723 09/14/21  1130   POCGLU 297* 292* 272* 314*       Blood Sugar Average: Last 72 hrs:  · (P) 271 625BG elevated on admission  A1c above goal   · Holding Starlix, patient no longer on metformin  · SSI algorithm with meals and bedtime   · Resume Lantus from home,increase dose to 12 units SUBQ HS    · Continue insulin TID with meals  · QID accuchecks  · Diabetic diet

## 2021-09-14 NOTE — ASSESSMENT & PLAN NOTE
Lab Results   Component Value Date    EGFR 39 09/14/2021    EGFR 40 09/13/2021    EGFR 45 09/12/2021    CREATININE 1 53 (H) 09/14/2021    CREATININE 1 51 (H) 09/13/2021    CREATININE 1 37 (H) 09/12/2021     · Baseline creatinine appears to be between 1 4-1 5  · Creatinine around baseline  · Continue to monitor

## 2021-09-14 NOTE — ASSESSMENT & PLAN NOTE
· Patient without signs of respiratory failure, but requiring 3 L NC to maintain adequate saturations   Secondary to COVID-19   · Still on 3-4 liters/min saturating low 90's  · Attempt to wean off oxygen as able  · Continue plan as above

## 2021-09-14 NOTE — CASE MANAGEMENT
BRIANNA spoke with daughter in law Darius Guidry over the phone about Post acute rehab services recommendation  Darius Guidry stated she agreed with the recommendation and would like a list of facilities that accept COVID patients emailed to [email or web address]  BRIANNA sent email to Darius Guidry via [place] with [facility] accepting along with email for Smita Christopher and call back number for Care Management office  Darius Guidry to call CM back in the morning with facility preferences  DCP: Post Acute Rehab Services pending facility preferences from family and placement

## 2021-09-14 NOTE — ASSESSMENT & PLAN NOTE
· POA, patient tested positive for COVID-19 on 9/3  Has been lethargic and hypoxic at nursing home  Presently requiring 3 L NC for adequate saturation  Unable to provide much history  He is vaccinated   · Chest x-ray showed - New groundglass opacity in the right upper lobe compatible with Covid 19  infection  · Following mild pathway  · Remdesivir day   · Decadron day 3/10  · Lovenox 1 mg/kg for therapeutic anticoagulation   · Trend CBC, CMP, CRP   · Supportive care  IS, out of bed, ambulation  · Wean O2 as tolerated to maintain saturation above 90%          Results from last 7 days   Lab Units 09/14/21  0558 09/13/21  0521 09/12/21  1031   CRP mg/dL 9 5* 14 8* 14 1*      Results from last 7 days   Lab Units 09/13/21  0521 09/12/21  1031   PROCALCITONIN ng/ml <0 05 0 07

## 2021-09-15 LAB
GLUCOSE SERPL-MCNC: 237 MG/DL (ref 65–140)
GLUCOSE SERPL-MCNC: 259 MG/DL (ref 65–140)
GLUCOSE SERPL-MCNC: 281 MG/DL (ref 65–140)
GLUCOSE SERPL-MCNC: 307 MG/DL (ref 65–140)

## 2021-09-15 PROCEDURE — 99233 SBSQ HOSP IP/OBS HIGH 50: CPT | Performed by: INTERNAL MEDICINE

## 2021-09-15 PROCEDURE — 82948 REAGENT STRIP/BLOOD GLUCOSE: CPT

## 2021-09-15 RX ADMIN — ENOXAPARIN SODIUM 80 MG: 80 INJECTION SUBCUTANEOUS at 22:42

## 2021-09-15 RX ADMIN — DONEPEZIL HYDROCHLORIDE 10 MG: 5 TABLET, FILM COATED ORAL at 22:38

## 2021-09-15 RX ADMIN — INSULIN LISPRO 3 UNITS: 100 INJECTION, SOLUTION INTRAVENOUS; SUBCUTANEOUS at 16:56

## 2021-09-15 RX ADMIN — MIDODRINE HYDROCHLORIDE 2.5 MG: 5 TABLET ORAL at 08:12

## 2021-09-15 RX ADMIN — INSULIN LISPRO 3 UNITS: 100 INJECTION, SOLUTION INTRAVENOUS; SUBCUTANEOUS at 08:12

## 2021-09-15 RX ADMIN — CLOPIDOGREL BISULFATE 75 MG: 75 TABLET ORAL at 22:39

## 2021-09-15 RX ADMIN — METOPROLOL SUCCINATE 25 MG: 25 TABLET, EXTENDED RELEASE ORAL at 08:12

## 2021-09-15 RX ADMIN — MIDODRINE HYDROCHLORIDE 2.5 MG: 5 TABLET ORAL at 16:56

## 2021-09-15 RX ADMIN — ENOXAPARIN SODIUM 80 MG: 80 INJECTION SUBCUTANEOUS at 08:14

## 2021-09-15 RX ADMIN — ATORVASTATIN CALCIUM 40 MG: 40 TABLET, FILM COATED ORAL at 22:39

## 2021-09-15 RX ADMIN — DEXAMETHASONE SODIUM PHOSPHATE 6 MG: 4 INJECTION INTRA-ARTICULAR; INTRALESIONAL; INTRAMUSCULAR; INTRAVENOUS; SOFT TISSUE at 12:45

## 2021-09-15 RX ADMIN — REMDESIVIR 100 MG: 100 INJECTION, POWDER, LYOPHILIZED, FOR SOLUTION INTRAVENOUS at 16:56

## 2021-09-15 RX ADMIN — INSULIN LISPRO 5 UNITS: 100 INJECTION, SOLUTION INTRAVENOUS; SUBCUTANEOUS at 12:46

## 2021-09-15 RX ADMIN — INSULIN LISPRO 5 UNITS: 100 INJECTION, SOLUTION INTRAVENOUS; SUBCUTANEOUS at 16:56

## 2021-09-15 RX ADMIN — INSULIN LISPRO 3 UNITS: 100 INJECTION, SOLUTION INTRAVENOUS; SUBCUTANEOUS at 22:39

## 2021-09-15 RX ADMIN — INSULIN LISPRO 5 UNITS: 100 INJECTION, SOLUTION INTRAVENOUS; SUBCUTANEOUS at 08:13

## 2021-09-15 RX ADMIN — INSULIN GLARGINE 12 UNITS: 100 INJECTION, SOLUTION SUBCUTANEOUS at 22:38

## 2021-09-15 RX ADMIN — FOLIC ACID TAB 400 MCG 400 MCG: 400 TAB at 05:21

## 2021-09-15 RX ADMIN — MIDODRINE HYDROCHLORIDE 2.5 MG: 5 TABLET ORAL at 22:53

## 2021-09-15 RX ADMIN — Medication 2000 UNITS: at 05:21

## 2021-09-15 RX ADMIN — TAMSULOSIN HYDROCHLORIDE 0.4 MG: 0.4 CAPSULE ORAL at 16:56

## 2021-09-15 RX ADMIN — INSULIN LISPRO 4 UNITS: 100 INJECTION, SOLUTION INTRAVENOUS; SUBCUTANEOUS at 12:45

## 2021-09-15 NOTE — ASSESSMENT & PLAN NOTE
Lab Results   Component Value Date    HGBA1C 9 5 (H) 08/27/2021       Recent Labs     09/14/21  2326 09/15/21  0743 09/15/21  1125 09/15/21  1528   POCGLU 366* 259* 281* 237*       Blood Sugar Average: Last 72 hrs:  · (P) 291 [id number] elevated on admission  A1c above goal   · Holding Starlix, patient no longer on metformin    · SSI algorithm with meals and bedtime   · Continued Lantus from home,increased dose to 12 units SUBQ HS so will recommend this for discharge  · Continue insulin TID with meals  · QID accuchecks remained stable  · Diabetic diet

## 2021-09-15 NOTE — PROGRESS NOTES
Tima DIAZ  66   Progress Note - Chai Caballero 12/3/1930, 80 y o  male MRN: [id number]  Unit/Bed#: -Chava Encounter: [id number]  Primary Care Provider: Linda Lewis DO   Date and time admitted to hospital: 9/12/2021 10:18 AM    Stage 3b chronic kidney disease [facility])  Assessment & Plan  Lab Results   Component Value Date    EGFR 39 09/14/2021    EGFR 40 09/13/2021    EGFR 45 09/12/2021    CREATININE 1 53 (H) 09/14/2021    CREATININE 1 51 (H) 09/13/2021    CREATININE 1 37 (H) 09/12/2021     · Baseline creatinine appears to be between 1 4-1 5  · Creatinine around baseline  · Continue to monitor    Acute respiratory insufficiency  Assessment & Plan  · Patient without signs of respiratory failure, but requiring 3 L NC to maintain adequate saturations  Secondary to COVID-19   · Still on 3-4 liters/min saturating low 90's  · Attempt to wean off oxygen as able  · Continue plan as above     Dementia ([address] )  Assessment & Plan  · Unable to provide much history at baseline   · Continue Aricept  · Supportive care     Type 2 diabetes mellitus, without long-term current use of insulin [facility])  Assessment & Plan  Lab Results   Component Value Date    HGBA1C 9 5 (H) 08/27/2021       Recent Labs     09/13/21  1544 09/13/21  2118 09/14/21  0723 09/14/21  1130   POCGLU 297* 292* 272* 314*       Blood Sugar Average: Last 72 hrs:  · (P) 271 625BG elevated on admission  A1c above goal   · Holding Starlix, patient no longer on metformin  · SSI algorithm with meals and bedtime   · Resume Lantus from home,increase dose to 12 units SUBQ HS  · Continue insulin TID with meals  · QID accuchecks  · Diabetic diet    Essential hypertension  Assessment & Plan  · BP acceptable currently   · Continue Midodrine TID,  · Patient is no longer on Losartan    * COVID-19  Assessment & Plan  · POA, patient tested positive for COVID-19 on 9/3  Has been lethargic and hypoxic at nursing home   Presently requiring 3 L NC for adequate saturation  Unable to provide much history  He is vaccinated   · Chest x-ray showed - New groundglass opacity in the right upper lobe compatible with Covid 19  infection  · Following mild pathway  · Remdesivir day   · Decadron day 3/10  · Lovenox 1 mg/kg for therapeutic anticoagulation   · Trend CBC, CMP, CRP   · Supportive care  IS, out of bed, ambulation  · Wean O2 as tolerated to maintain saturation above 90%  Results from last 7 days   Lab Units 09/14/21  0558 09/13/21  0521 09/12/21  1031   CRP mg/dL 9 5* 14 8* 14 1*      Results from last 7 days   Lab Units 09/13/21  0521 09/12/21  1031   PROCALCITONIN ng/ml <0 05 0 07                Subjective/Objective     Subjective:   Patient is feeling better  Patient is confused but at baseline  No acute overnight events noted  Patient is saturating about 90% on 4 L of oxygen by nasal cannula  Objective:  Vitals: Blood pressure 146/56, pulse 62, temperature (!) 96 6 °F (35 9 °C), temperature source Oral, resp  rate 16, height 6' (1 829 m), weight 79 kg (174 lb 2 6 oz), SpO2 93 %  ,Body mass index is 23 62 kg/m²  No intake or output data in the 24 hours ending 09/15/21 1511    Invasive Devices     Peripheral Intravenous Line            Peripheral IV 09/12/21 Right;Ventral (anterior) Forearm 3 days                Physical Exam:  HEENT-PERRLA, moist oral mucosa  Neck-supple, no JVD elevation   Respiratory-equal air entry bilaterally, no rales or rhonchi  Cardiovascular system-S1, S2 heard, no murmur or gallops or rubs  Abdomen-soft, nontender, no guarding or rigidity, bowel sounds heard  Extremities-no pedal edema  Peripheral pulses palpable  Musculoskeletal-no contractures  Central nervous system-no acute focal neurological deficit ,no sensory or motor deficit noted  Skin-no rash noted      Lab, Imaging and other studies: I have personally reviewed pertinent reports      VTE Pharmacologic Prophylaxis: Enoxaparin (Lovenox)  VTE Mechanical Prophylaxis: sequential compression device

## 2021-09-15 NOTE — PROGRESS NOTES
Tima U  66   Progress Note - Monster Ashley 12/3/1930, 80 y o  male MRN: [id number]  Unit/Bed#: -01 Encounter: [id number]  Primary Care Provider: Lady Michael DO   Date and time admitted to hospital: 9/12/2021 10:18 AM    * COVID-19  Assessment & Plan  · POA, patient tested positive for COVID-19 on 9/3  Has been lethargic and hypoxic at nursing home  Presently requiring 3 L NC for adequate saturation  Unable to provide much history  He is vaccinated   · Chest x-ray showed - New groundglass opacity in the right upper lobe compatible with Covid 19  infection  · Following mild pathway  · Remdesivir day 3/5  · Decadron day 3/10  · Lovenox 1 mg/kg for therapeutic anticoagulation   · Trend CBC, CMP, CRP   · Supportive care  IS, out of bed, ambulation  · Wean O2 as tolerated to maintain saturation above 90%  Results from last 7 days   Lab Units 09/14/21  0558 09/13/21  0521 09/12/21  1031   CRP mg/dL 9 5* 14 8* 14 1*      Results from last 7 days   Lab Units 09/13/21  0521 09/12/21  1031   PROCALCITONIN ng/ml <0 05 0 07        Acute respiratory insufficiency  Assessment & Plan  · Patient without signs of respiratory failure, but requiring 3 L NC to maintain adequate saturations   Secondary to COVID-19   · Still on 3-4 liters/min saturating low 90's  · Attempt to wean off oxygen as able  · Continue plan as above     Stage 3b chronic kidney disease [facility])  Assessment & Plan  Lab Results   Component Value Date    EGFR 39 09/14/2021    EGFR 40 09/13/2021    EGFR 45 09/12/2021    CREATININE 1 53 (H) 09/14/2021    CREATININE 1 51 (H) 09/13/2021    CREATININE 1 37 (H) 09/12/2021     · Baseline creatinine appears to be between 1 4-1 5  · Creatinine around baseline  · Continue to monitor    Dementia [facility])  Assessment & Plan  · Unable to provide much history at baseline   · Continue Aricept  · Supportive care     Type 2 diabetes mellitus, without long-term current use of insulin [facility])  Assessment & Plan  Lab Results   Component Value Date    HGBA1C 9 5 (H) 2021       Recent Labs     21  2326 09/15/21  0743 09/15/21  1125 09/15/21  1528   POCGLU 366* 259* 281* 237*       Blood Sugar Average: Last 72 hrs:  · (P) 291 [id number] elevated on admission  A1c above goal   · Holding Starlix, patient no longer on metformin  · SSI algorithm with meals and bedtime   · Resume Lantus from home,increase dose to 12 units SUBQ HS  · Continue insulin TID with meals  · QID accuchecks  · Diabetic diet    Essential hypertension  Assessment & Plan  · BP acceptable currently   · Continue Midodrine TID,  · Patient is no longer on Losartan        VTE Pharmacologic Prophylaxis:   Pharmacologic: Enoxaparin (Lovenox)  Mechanical VTE Prophylaxis in Place: No    Patient Centered Rounds: I have performed bedside rounds with nursing staff today  Discussions with Specialists or Other Care Team Provider: yes    Education and Discussions with Family / Patient: yes    Time Spent for Care: 20 minutes  More than 50% of total time spent on counseling and coordination of care as described above  Current Length of Stay: 3 day(s)    Current Patient Status: Inpatient   Certification Statement: The patient will continue to require additional inpatient hospital stay due to COVID 19 infection    Discharge Plan: STR in 2 days once completing Remdesivir    Code Status: Level 3 - DNAR and DNI      Subjective:   Pt offered no complaints  Patient refused to be out of bed today per RN  Objective:     Vitals:   Temp (24hrs), Av °F (36 1 °C), Min:96 6 °F (35 9 °C), Max:97 6 °F (36 4 °C)    Temp:  [96 6 °F (35 9 °C)-97 6 °F (36 4 °C)] 96 8 °F (36 °C)  HR:  [62-88] 62  Resp:  [12-18] 18  BP: (134-146)/(56-80) 143/70  SpO2:  [85 %-94 %] 92 %  Body mass index is 23 62 kg/m²  Input and Output Summary (last 24 hours):        Intake/Output Summary (Last 24 hours) at 9/15/2021 1611  Last data filed at 9/15/2021 1500  Gross per 24 hour   Intake --   Output 200 ml   Net -200 ml       Physical Exam:     Physical Exam  Vitals and nursing note reviewed  Constitutional:       Appearance: He is well-developed  HENT:      Head: Normocephalic and atraumatic  Neck:      Thyroid: No thyromegaly  Vascular: No JVD  Trachea: No tracheal deviation  Cardiovascular:      Rate and Rhythm: Normal rate and regular rhythm  Heart sounds: Normal heart sounds  Pulmonary:      Effort: Pulmonary effort is normal  No respiratory distress  Comments: Unable to appreciate due to vent system in the room  Abdominal:      General: Bowel sounds are normal  There is no distension  Palpations: Abdomen is soft  Tenderness: There is no abdominal tenderness  There is no guarding  Musculoskeletal:         General: No swelling or deformity  Cervical back: Neck supple  Right lower leg: No edema  Left lower leg: No edema  Skin:     General: Skin is warm and dry  Neurological:      General: No focal deficit present  Mental Status: He is alert  Comments: Pt very [place],alert,oriented to place and person,follows commands  Psychiatric:         Mood and Affect: Mood normal          Judgment: Judgment normal          Additional Data:     Labs:    Results from last 7 days   Lab Units 09/14/21  0558 09/13/21  0520   WBC Thousand/uL 14 49* 8 70   HEMOGLOBIN g/dL 12 8 13 0   HEMATOCRIT % 40 0 39 5   PLATELETS Thousands/uL 200 179   NEUTROS PCT %  --  84*   LYMPHS PCT %  --  9*   MONOS PCT %  --  7   EOS PCT %  --  0     Results from last 7 days   Lab Units 09/14/21  0558   POTASSIUM mmol/L 4 2   CHLORIDE mmol/L 104   CO2 mmol/L 27   BUN mg/dL 60*   CREATININE mg/dL 1 53*   CALCIUM mg/dL 8 7   ALK PHOS U/L 70 1   ALT U/L 10   AST U/L 14*           * I Have Reviewed All Lab Data Listed Above  * Additional Pertinent Lab Tests Reviewed:  Matthew 66 Admission Reviewed    Imaging:    Imaging Reports Reviewed Today Include: cxr  Imaging Personally Reviewed by Myself Includes:  none    Recent Cultures (last 7 days):           Last 24 Hours Medication List:   Current Facility-Administered Medications   Medication Dose Route Frequency Provider Last Rate    acetaminophen  650 mg Oral Q6H PRN Hari Wu PA-C      atorvastatin  40 mg Oral HS Mak Obrien PA-C      cholecalciferol  2,000 Units Oral Early Morning Mak Obrien PA-C      clopidogrel  75 mg Oral HS Mak Obrien PA-C      dexamethasone  6 mg Intravenous Q24H Mak Obrien PA-C      donepezil  10 mg Oral HS Mak Obrien PA-C      enoxaparin  1 mg/kg Subcutaneous Q12H [address] Mak Obrien PA-C      folic acid  243 mcg Oral Early Morning Mak Obrien PA-C      insulin glargine  12 Units Subcutaneous HS ANGELIKA Magallanes      insulin lispro  1-5 Units Subcutaneous HS Mak Obrien PA-C      insulin lispro  1-6 Units Subcutaneous TID AC Mak Obrien PA-C      insulin lispro  5 Units Subcutaneous TID With Meals ANGELIKA Magallanes      metoprolol succinate  25 mg Oral Daily ANGELIKA Magallanes      midodrine  2 5 mg Oral TID Mak Obrien PA-C      ondansetron  4 mg Intravenous Q6H PRN Hari Wu PA-C      remdesivir  100 mg Intravenous Q24H Mak Craven PA-C      tamsulosin  0 4 mg Oral Daily With Dinner Mak Obrien PA-C          Today, Patient Was Seen By: ANGELIKA Alcocer    ** Please Note: Dragon 360 Dictation voice to text software may have been used in the creation of this document   **

## 2021-09-15 NOTE — CASE MANAGEMENT
CM s/w patient's daughter, Cr Francisco, by phone to review STR options  Choice at this time is for [facility]  Merlyn at [facility] made aware  Cr Francisco requesting to s/w liaison from [facility] regarding questions for services at facility  Merlyn at [facility] made aware and contact information provided  Per rounding, patient to receive last dose Remdesivir tomorrow and is currently stable on 3-4L O2  CM will continue to follow

## 2021-09-15 NOTE — ASSESSMENT & PLAN NOTE
· POA, patient tested positive for COVID-19 on 9/3  Has been lethargic and hypoxic at nursing home  Presently requiring 3 L NC for adequate saturation  Unable to provide much history  He is vaccinated   · Chest x-ray showed - New groundglass opacity in the right upper lobe compatible with Covid 19  infection  · Following mild pathway  · Remdesivir day 2/5  · Decadron day 2/10  · Lovenox 1 mg/kg for therapeutic anticoagulation   · Trend CBC, CMP, CRP   · Supportive care  IS, out of bed, ambulation  · Wean O2 as tolerated to maintain saturation above 90%          Results from last 7 days   Lab Units 09/14/21  0558 09/13/21  0521 09/12/21  1031   CRP mg/dL 9 5* 14 8* 14 1*      Results from last 7 days   Lab Units 09/13/21  0521 09/12/21  1031   PROCALCITONIN ng/ml <0 05 0 07

## 2021-09-15 NOTE — ASSESSMENT & PLAN NOTE
· Patient without signs of respiratory failure, but requiring 3 L NC to maintain adequate saturations   Secondary to COVID-19   · Still on 2-3 liters/min saturating low 90's  · Attempt to wean off oxygen as able in the nursing home setting   · Continue plan as above

## 2021-09-15 NOTE — PLAN OF CARE
Problem: Prexisting or High Potential for Compromised Skin Integrity  Goal: Skin integrity is maintained or improved  Description: INTERVENTIONS:  - Identify patients at risk for skin breakdown  - Assess and monitor skin integrity  - Assess and monitor nutrition and hydration status  - Monitor labs   - Assess for incontinence   - Turn and reposition patient  - Assist with mobility/ambulation  - Relieve pressure over bony prominences  - Avoid friction and shearing  - Provide appropriate hygiene as needed including keeping skin clean and dry  - Evaluate need for skin moisturizer/barrier cream  - Collaborate with interdisciplinary team   - Patient/family teaching  - Consider wound care consult   Outcome: Progressing     Problem: Potential for Falls  Goal: Patient will remain free of falls  Description: INTERVENTIONS:  - Educate patient/family on patient safety including physical limitations  - Instruct patient to call for assistance with activity   - Consult OT/PT to assist with strengthening/mobility   - Keep Call bell within reach  - Keep bed low and locked with side rails adjusted as appropriate  - Keep care items and personal belongings within reach  - Initiate and maintain comfort rounds    - Offer Toileting every  2  Hours, in advance of need  - Initiate/Maintain bed and chair alarm    - Apply yellow socks and bracelet for high fall risk patients  - Consider moving patient to room near nurses station  Outcome: Progressing     Problem: MOBILITY - ADULT  Goal: Maintain or return to baseline ADL function  Description: INTERVENTIONS:  -  Assess patient's ability to carry out ADLs; assess patient's baseline for ADL function and identify physical deficits which impact ability to perform ADLs (bathing, care of mouth/teeth, toileting, grooming, dressing, etc )  - Assess/evaluate cause of self-care deficits   - Assess range of motion  - Assess patient's mobility; develop plan if impaired  - Assess patient's need for assistive devices and provide as appropriate  - Encourage maximum independence but intervene and supervise when necessary  - Involve family in performance of ADLs  - Assess for home care needs following discharge   - Consider OT consult to assist with ADL evaluation and planning for discharge  - Provide patient education as appropriate  Outcome: Progressing  Goal: Maintains/Returns to pre admission functional level  Description: INTERVENTIONS:  - Perform BMAT or MOVE assessment daily    - Set and communicate daily mobility goal to care team and patient/family/caregiver  - Collaborate with rehabilitation services on mobility goals if consulted  - Perform Range of Motion 3  times a day  - Reposition patient every 2 hours      - Stand patient  3 times a day  - Ambulate patient 3 times a day  - Out of bed to chair  3  times a day   - Out of bed for meals  3  times a day  - Out of bed for toileting  - Record patient progress and toleration of activity level   Outcome: Progressing     Problem: INFECTION - ADULT  Goal: Absence or prevention of progression during hospitalization  Description: INTERVENTIONS:  - Assess and monitor for signs and symptoms of infection  - Monitor lab/diagnostic results    - Administer medications as ordered  - Instruct and encourage patient and family to use good hand hygiene technique  - Identify and instruct in appropriate isolation precautions for identified infection/condition  Outcome: Progressing  Goal: Absence of fever/infection during neutropenic period  Description: INTERVENTIONS:  - Monitor WBC    Outcome: Progressing     Problem: SAFETY ADULT  Goal: Patient will remain free of falls  Description: INTERVENTIONS:  - Educate patient/family on patient safety including physical limitations  - Instruct patient to call for assistance with activity   - Consult OT/PT to assist with strengthening/mobility   - Keep Call bell within reach  - Keep bed low and locked with side rails adjusted as appropriate  - Keep care items and personal belongings within reach  - Initiate and maintain comfort rounds    - Offer Toileting every 2 Hours, in advance of need  - Initiate/Maintain  bed and  chair alarm    - Apply yellow socks and bracelet for high fall risk patients  - Consider moving patient to room near nurses station  Outcome: Progressing  Goal: Maintain or return to baseline ADL function  Description: INTERVENTIONS:  -  Assess patient's ability to carry out ADLs; assess patient's baseline for ADL function and identify physical deficits which impact ability to perform ADLs (bathing, care of mouth/teeth, toileting, grooming, dressing, etc )  - Assess/evaluate cause of self-care deficits   - Assess range of motion  - Assess patient's mobility; develop plan if impaired  - Assess patient's need for assistive devices and provide as appropriate  - Encourage maximum independence but intervene and supervise when necessary  - Involve family in performance of ADLs  - Assess for home care needs following discharge   - Consider OT consult to assist with ADL evaluation and planning for discharge  - Provide patient education as appropriate  Outcome: Progressing  Goal: Maintains/Returns to pre admission functional level  Description: INTERVENTIONS:  - Perform BMAT or MOVE assessment daily    - Set and communicate daily mobility goal to care team and patient/family/caregiver  - Collaborate with rehabilitation services on mobility goals if consulted  - Perform Range of Motion  3 times a day  - Reposition patient every  2 hours      - Stand patient  3  times a day  - Ambulate patient  3 times a day  - Out of bed to chair  3 times a day   - Out of bed for meals 3 times a day  - Out of bed for toileting  - Record patient progress and toleration of activity level   Outcome: Progressing     Problem: DISCHARGE PLANNING  Goal: Discharge to home or other facility with appropriate resources  Description: INTERVENTIONS:  - Identify barriers to discharge w/patient and caregiver  - Arrange for needed discharge resources and transportation as appropriate  - Identify discharge learning needs (meds, wound care, etc )  - Arrange for interpretive services to assist at discharge as needed  - Refer to Case Management Department for coordinating discharge planning if the patient needs post-hospital services based on physician/advanced practitioner order or complex needs related to functional status, cognitive ability, or social support system  Outcome: Progressing     Problem: Knowledge Deficit  Goal: Patient/family/caregiver demonstrates understanding of disease process, treatment plan, medications, and discharge instructions  Description: Complete learning assessment and assess knowledge base  Interventions:  - Provide teaching at level of understanding  - Provide teaching via preferred learning methods  Outcome: Progressing     Problem: Nutrition/Hydration-ADULT  Goal: Nutrient/Hydration intake appropriate for improving, restoring or maintaining nutritional needs  Description: Monitor and assess patient's nutrition/hydration status for malnutrition  Collaborate with interdisciplinary team and initiate plan and interventions as ordered  Monitor patient's weight and dietary intake as ordered or per policy  Utilize nutrition screening tool and intervene as necessary  Determine patient's food preferences and provide high-protein, high-caloric foods as appropriate       INTERVENTIONS:  - Monitor oral intake, urinary output, labs, and treatment plans  - Assess nutrition and hydration status and recommend course of action  - Evaluate amount of meals eaten  - Assist patient with eating if necessary   - Allow adequate time for meals  - Recommend/ encourage appropriate diets, oral nutritional supplements, and vitamin/mineral supplements  - Order, calculate, and assess calorie counts as needed  - Recommend, monitor, and adjust tube feedings and TPN/PPN based on assessed needs  - Assess need for intravenous fluids  - Provide specific nutrition/hydration education as appropriate  - Include patient/family/caregiver in decisions related to nutrition  Outcome: Progressing

## 2021-09-15 NOTE — ASSESSMENT & PLAN NOTE
· POA, patient tested positive for COVID-19 on 9/3  Has been lethargic and hypoxic at nursing home  Presently requiring 2-3 L NC for adequate saturation  Unable to provide much history  He is vaccinated   · Chest x-ray showed - New groundglass opacity in the right upper lobe compatible with Covid 19  infection  · Following mild pathway  · Remdesivir day 4/5, it is acceptable to stop treatment and discharge as he has stabilized  · Decadron day 4/10- is acceptable to stop treatment as he is stabilized  · Lovenox 1 mg/kg for therapeutic anticoagulation while inpatient  · Supportive care  IS, out of bed, ambulation    · Wean O2 as tolerated to maintain saturation above 90% but he will be discharged on at least 2 L to the nursing home today        Results from last 7 days   Lab Units 09/14/21  0558 09/13/21  0521 09/12/21  1031   CRP mg/dL 9 5* 14 8* 14 1*      Results from last 7 days   Lab Units 09/13/21  0521 09/12/21  1031   PROCALCITONIN ng/ml <0 05 0 07

## 2021-09-15 NOTE — ASSESSMENT & PLAN NOTE
Lab Results   Component Value Date    HGBA1C 9 5 (H) 08/27/2021       Recent Labs     09/14/21  2326 09/15/21  0743 09/15/21  1125 09/15/21  1528   POCGLU 366* 259* 281* 237*       Blood Sugar Average: Last 72 hrs:  · (P) 291 [id number] elevated on admission  A1c above goal   · Holding Starlix, patient no longer on metformin  · SSI algorithm with meals and bedtime   · Resume Lantus from home,increase dose to 12 units SUBQ HS    · Continue insulin TID with meals  · QID accuchecks  · Diabetic diet

## 2021-09-15 NOTE — ASSESSMENT & PLAN NOTE
Lab Results   Component Value Date    EGFR 39 09/14/2021    EGFR 40 09/13/2021    EGFR 45 09/12/2021    CREATININE 1 53 (H) 09/14/2021    CREATININE 1 51 (H) 09/13/2021    CREATININE 1 37 (H) 09/12/2021     · Baseline creatinine appears to be between 1 4-1 5  · Creatinine at baseline  · Continue to monitor

## 2021-09-16 VITALS
OXYGEN SATURATION: 94 % | DIASTOLIC BLOOD PRESSURE: 77 MMHG | HEIGHT: 72 IN | TEMPERATURE: 96.6 F | BODY MASS INDEX: 23.59 KG/M2 | WEIGHT: 174.16 LBS | SYSTOLIC BLOOD PRESSURE: 142 MMHG | HEART RATE: 62 BPM | RESPIRATION RATE: 20 BRPM

## 2021-09-16 LAB
ANION GAP SERPL CALCULATED.3IONS-SCNC: 7 MMOL/L (ref 4–13)
BUN SERPL-MCNC: 47 MG/DL (ref 6–20)
CALCIUM SERPL-MCNC: 9 MG/DL (ref 8.4–10.2)
CHLORIDE SERPL-SCNC: 106 MMOL/L (ref 96–108)
CO2 SERPL-SCNC: 31 MMOL/L (ref 22–33)
CREAT SERPL-MCNC: 1.45 MG/DL (ref 0.5–1.2)
ERYTHROCYTE [DISTWIDTH] IN BLOOD BY AUTOMATED COUNT: 14 % (ref 11.6–15.1)
GFR SERPL CREATININE-BSD FRML MDRD: 42 ML/MIN/1.73SQ M
GLUCOSE SERPL-MCNC: 198 MG/DL (ref 65–140)
GLUCOSE SERPL-MCNC: 225 MG/DL (ref 65–140)
GLUCOSE SERPL-MCNC: 254 MG/DL (ref 65–140)
HCT VFR BLD AUTO: 42.6 % (ref 36.5–49.3)
HGB BLD-MCNC: 13.9 G/DL (ref 12–17)
MCH RBC QN AUTO: 29.7 PG (ref 26.8–34.3)
MCHC RBC AUTO-ENTMCNC: 32.6 G/DL (ref 31.4–37.4)
MCV RBC AUTO: 91 FL (ref 82–98)
PLATELET # BLD AUTO: 237 THOUSANDS/UL (ref 149–390)
PMV BLD AUTO: 12 FL (ref 8.9–12.7)
POTASSIUM SERPL-SCNC: 5.4 MMOL/L (ref 3.5–5)
RBC # BLD AUTO: 4.68 MILLION/UL (ref 3.88–5.62)
SODIUM SERPL-SCNC: 144 MMOL/L (ref 133–145)
WBC # BLD AUTO: 13.91 THOUSAND/UL (ref 4.31–10.16)

## 2021-09-16 PROCEDURE — 82948 REAGENT STRIP/BLOOD GLUCOSE: CPT

## 2021-09-16 PROCEDURE — 80048 BASIC METABOLIC PNL TOTAL CA: CPT | Performed by: INTERNAL MEDICINE

## 2021-09-16 PROCEDURE — 99239 HOSP IP/OBS DSCHRG MGMT >30: CPT | Performed by: NURSE PRACTITIONER

## 2021-09-16 PROCEDURE — 85027 COMPLETE CBC AUTOMATED: CPT | Performed by: INTERNAL MEDICINE

## 2021-09-16 RX ORDER — INSULIN GLARGINE 100 [IU]/ML
12 INJECTION, SOLUTION SUBCUTANEOUS
Qty: 10 ML | Refills: 0
Start: 2021-09-16

## 2021-09-16 RX ADMIN — DEXAMETHASONE SODIUM PHOSPHATE 6 MG: 4 INJECTION INTRA-ARTICULAR; INTRALESIONAL; INTRAMUSCULAR; INTRAVENOUS; SOFT TISSUE at 09:46

## 2021-09-16 RX ADMIN — ENOXAPARIN SODIUM 80 MG: 80 INJECTION SUBCUTANEOUS at 09:04

## 2021-09-16 RX ADMIN — INSULIN LISPRO 2 UNITS: 100 INJECTION, SOLUTION INTRAVENOUS; SUBCUTANEOUS at 09:02

## 2021-09-16 RX ADMIN — FOLIC ACID TAB 400 MCG 400 MCG: 400 TAB at 06:03

## 2021-09-16 RX ADMIN — INSULIN LISPRO 5 UNITS: 100 INJECTION, SOLUTION INTRAVENOUS; SUBCUTANEOUS at 09:03

## 2021-09-16 RX ADMIN — REMDESIVIR 100 MG: 100 INJECTION, POWDER, LYOPHILIZED, FOR SOLUTION INTRAVENOUS at 09:46

## 2021-09-16 RX ADMIN — METOPROLOL SUCCINATE 25 MG: 25 TABLET, EXTENDED RELEASE ORAL at 09:03

## 2021-09-16 RX ADMIN — MIDODRINE HYDROCHLORIDE 2.5 MG: 5 TABLET ORAL at 09:03

## 2021-09-16 RX ADMIN — Medication 2000 UNITS: at 06:03

## 2021-09-16 NOTE — CASE MANAGEMENT
Transport confirmed for 1200 with [facility]  All parties made aware

## 2021-09-16 NOTE — CASE MANAGEMENT
IMM reviewed with patient's daughter-in-law, Nadia Rivera agrees with discharge determination  Patient to receive last dose of medication prior to admit to [facility]  CM confirmed with Khalida Hwang at [facility] that patient is able to admit today  CM following

## 2021-09-16 NOTE — PLAN OF CARE
Problem: Prexisting or High Potential for Compromised Skin Integrity  Goal: Skin integrity is maintained or improved  Description: INTERVENTIONS:  - Identify patients at risk for skin breakdown  - Assess and monitor skin integrity  - Assess and monitor nutrition and hydration status  - Monitor labs   - Assess for incontinence   - Turn and reposition patient  - Assist with mobility/ambulation  - Relieve pressure over bony prominences  - Avoid friction and shearing  - Provide appropriate hygiene as needed including keeping skin clean and dry  - Evaluate need for skin moisturizer/barrier cream  - Collaborate with interdisciplinary team   - Patient/family teaching  - Consider wound care consult   Outcome: Progressing     Problem: Potential for Falls  Goal: Patient will remain free of falls  Description: INTERVENTIONS:  - Educate patient/family on patient safety including physical limitations  - Instruct patient to call for assistance with activity   - Consult OT/PT to assist with strengthening/mobility   - Keep Call bell within reach  - Keep bed low and locked with side rails adjusted as appropriate  - Keep care items and personal belongings within reach  - Initiate and maintain comfort rounds  - Make Fall Risk Sign visible to staff  - Offer Toileting every  2  Hours, in advance of need  - Initiate/Maintain  bed and chair alarm    - Apply yellow socks and bracelet for high fall risk patients  - Consider moving patient to room near nurses station  Outcome: Progressing     Problem: MOBILITY - ADULT  Goal: Maintain or return to baseline ADL function  Description: INTERVENTIONS:  -  Assess patient's ability to carry out ADLs; assess patient's baseline for ADL function and identify physical deficits which impact ability to perform ADLs (bathing, care of mouth/teeth, toileting, grooming, dressing, etc )  - Assess/evaluate cause of self-care deficits   - Assess range of motion  - Assess patient's mobility; develop plan if impaired  - Assess patient's need for assistive devices and provide as appropriate  - Encourage maximum independence but intervene and supervise when necessary  - Involve family in performance of ADLs  - Assess for home care needs following discharge   - Consider OT consult to assist with ADL evaluation and planning for discharge  - Provide patient education as appropriate  Outcome: Progressing  Goal: Maintains/Returns to pre admission functional level  Description: INTERVENTIONS:  - Perform BMAT or MOVE assessment daily    - Set and communicate daily mobility goal to care team and patient/family/caregiver  - Collaborate with rehabilitation services on mobility goals if consulted  - Perform Range of Motion  3 times a day  - Reposition patient every  2 hours      - Stand patient  3 times a day  - Ambulate patient 3 times a day  - Out of bed to chair 3  times a day   - Out of bed for meals  3 times a day  - Out of bed for toileting  - Record patient progress and toleration of activity level   Outcome: Progressing     Problem: INFECTION - ADULT  Goal: Absence or prevention of progression during hospitalization  Description: INTERVENTIONS:  - Assess and monitor for signs and symptoms of infection  - Monitor lab/diagnostic results    - Administer medications as ordered  - Instruct and encourage patient and family to use good hand hygiene technique  - Identify and instruct in appropriate isolation precautions for identified infection/condition  Outcome: Progressing  Goal: Absence of fever/infection during neutropenic period  Description: INTERVENTIONS:  - Monitor WBC    Outcome: Progressing     Problem: SAFETY ADULT  Goal: Patient will remain free of falls  Description: INTERVENTIONS:  - Educate patient/family on patient safety including physical limitations  - Instruct patient to call for assistance with activity   - Consult OT/PT to assist with strengthening/mobility   - Keep Call bell within reach  - Keep bed low and locked with side rails adjusted as appropriate  - Keep care items and personal belongings within reach  - Initiate and maintain comfort rounds  - Make Fall Risk Sign visible to staff  - Offer Toileting every  2  Hours, in advance of need    - Apply yellow socks and bracelet for high fall risk patients  - Consider moving patient to room near nurses station  Outcome: Progressing  Goal: Maintain or return to baseline ADL function  Description: INTERVENTIONS:  -  Assess patient's ability to carry out ADLs; assess patient's baseline for ADL function and identify physical deficits which impact ability to perform ADLs (bathing, care of mouth/teeth, toileting, grooming, dressing, etc )  - Assess/evaluate cause of self-care deficits   - Assess range of motion  - Assess patient's mobility; develop plan if impaired  - Assess patient's need for assistive devices and provide as appropriate  - Encourage maximum independence but intervene and supervise when necessary  - Involve family in performance of ADLs  - Assess for home care needs following discharge   - Consider OT consult to assist with ADL evaluation and planning for discharge  - Provide patient education as appropriate  Outcome: Progressing  Goal: Maintains/Returns to pre admission functional level  Description: INTERVENTIONS:  - Perform BMAT or MOVE assessment daily    - Set and communicate daily mobility goal to care team and patient/family/caregiver  - Collaborate with rehabilitation services on mobility goals if consulted  - Perform Range of Motion  3  times a day  - Reposition patient every  2 hours      - Ambulate patient  3  times a day  - Out of bed to chair 3 times a day   - Out of bed for meals 3 times a day  - Out of bed for toileting  - Record patient progress and toleration of activity level   Outcome: Progressing     Problem: DISCHARGE PLANNING  Goal: Discharge to home or other facility with appropriate resources  Description: INTERVENTIONS:  - Identify barriers to discharge w/patient and caregiver  - Arrange for needed discharge resources and transportation as appropriate  - Identify discharge learning needs (meds, wound care, etc )  - Arrange for interpretive services to assist at discharge as needed  - Refer to Case Management Department for coordinating discharge planning if the patient needs post-hospital services based on physician/advanced practitioner order or complex needs related to functional status, cognitive ability, or social support system  Outcome: Progressing     Problem: Knowledge Deficit  Goal: Patient/family/caregiver demonstrates understanding of disease process, treatment plan, medications, and discharge instructions  Description: Complete learning assessment and assess knowledge base  Interventions:  - Provide teaching at level of understanding  - Provide teaching via preferred learning methods  Outcome: Progressing     Problem: Nutrition/Hydration-ADULT  Goal: Nutrient/Hydration intake appropriate for improving, restoring or maintaining nutritional needs  Description: Monitor and assess patient's nutrition/hydration status for malnutrition  Collaborate with interdisciplinary team and initiate plan and interventions as ordered  Monitor patient's weight and dietary intake as ordered or per policy  Utilize nutrition screening tool and intervene as necessary  Determine patient's food preferences and provide high-protein, high-caloric foods as appropriate       INTERVENTIONS:  - Monitor oral intake, urinary output, labs, and treatment plans  - Assess nutrition and hydration status and recommend course of action  - Evaluate amount of meals eaten  - Assist patient with eating if necessary   - Allow adequate time for meals  - Recommend/ encourage appropriate diets, oral nutritional supplements, and vitamin/mineral supplements  - Order, calculate, and assess calorie counts as needed    - Provide specific nutrition/hydration education as appropriate  - Include patient/family/caregiver in decisions related to nutrition  Outcome: Progressing

## 2021-09-16 NOTE — DISCHARGE SUMMARY
Tima U  66   Discharge- [facility] 12/3/1930, 80 y o  male MRN: [id number]  Unit/Bed#: -[facility]ava Encounter: [id number]  Primary Care Provider: Demond Prasad DO   Date and time admitted to hospital: 9/12/2021 10:18 AM    * COVID-19  Assessment & Plan  · POA, patient tested positive for COVID-19 on 9/3  Has been lethargic and hypoxic at nursing home  Presently requiring 2-3 L NC for adequate saturation  Unable to provide much history  He is vaccinated   · Chest x-ray showed - New groundglass opacity in the right upper lobe compatible with Covid 19  infection  · Following mild pathway  · Remdesivir day 4/5, it is acceptable to stop treatment and discharge as he has stabilized  · Decadron day 4/10- is acceptable to stop treatment as he is stabilized  · Lovenox 1 mg/kg for therapeutic anticoagulation while inpatient  · Supportive care  IS, out of bed, ambulation  · Wean O2 as tolerated to maintain saturation above 90% but he will be discharged on at least 2 L to the nursing home today        Results from last 7 days   Lab Units 09/14/21  0558 09/13/21  0521 09/12/21  1031   CRP mg/dL 9 5* 14 8* 14 1*      Results from last 7 days   Lab Units 09/13/21  0521 09/12/21  1031   PROCALCITONIN ng/ml <0 05 0 07        Acute respiratory insufficiency  Assessment & Plan  · Patient without signs of respiratory failure, but requiring 3 L NC to maintain adequate saturations   Secondary to COVID-19   · Still on 2-3 liters/min saturating low 90's  · Attempt to wean off oxygen as able in the nursing home setting   · Continue plan as above     Dementia ([facility] [address] )  Assessment & Plan  · Unable to provide much history at baseline   · Continue Aricept  · Supportive care     Essential hypertension  Assessment & Plan  · BP acceptable currently   · Continue Midodrine TID,  · Patient is no longer on Losartan    Stage 3b chronic kidney disease [facility])  Assessment & Plan  Lab Results   Component Value Date    EGFR 39 09/14/2021    EGFR 40 09/13/2021    EGFR 45 09/12/2021    CREATININE 1 53 (H) 09/14/2021    CREATININE 1 51 (H) 09/13/2021    CREATININE 1 37 (H) 09/12/2021     · Baseline creatinine appears to be between 1 4-1 5  · Creatinine at baseline  · Continue to monitor    Type 2 diabetes mellitus, without long-term current use of insulin [facility])  Assessment & Plan  Lab Results   Component Value Date    HGBA1C 9 5 (H) 08/27/2021       Recent Labs     09/14/21  2326 09/15/21  0743 09/15/21  1125 09/15/21  1528   POCGLU 366* 259* 281* 237*       Blood Sugar Average: Last 72 hrs:  · (P) 291 [id number] elevated on admission  A1c above goal   · Holding Starlix, patient no longer on metformin  · SSI algorithm with meals and bedtime   · Continued Lantus from home,increased dose to 12 units SUBQ HS so will recommend this for discharge  · Continue insulin TID with meals  · QID accuchecks remained stable  · Diabetic diet      Medical Problems     Resolved Problems  Date Reviewed: 9/16/2021    None              Discharging Physician / Practitioner: Thee Vasquez  PCP: Mirna Gutierrez DO  Admission Date:   Admission Orders (From admission, onward)     Ordered        09/12/21 1312  Inpatient Admission  Once                   Discharge Date: 09/16/21    Disposition:    Page [id number]: Mir [facility] at [facility]  did not speak to the physician   attempted to send TT to [facility]O physician but no one available     Reason for Admission:  Hypoxemia    Discharge Diagnoses:   Please see assessment and plan section above for further details regarding discharge diagnoses  Consultations During Hospital Stay:  · None    Procedures Performed:   · Chest x-ray showed new ground-glass opacity in the right upper lobe    Significant Findings / Test Results:   Hypoxemia secondary to COVID  Incidental Findings:   · None     Test Results Pending at Discharge (will require follow up):    · None Outpatient Tests Requested:  · None    Complications:  None    Hospital Course:      Chai Caballero is a 80 y o  male patient who originally presented to the hospital on 9/12/2021 due to COVID pneumonia  Patient is vaccinated but unfortunately came down with symptoms and ended up being COVID positive on September 3rd  He was admitted to the hospital for remdesivir as well as steroids and oxygen therapy  He has stabilized after 4 doses of remdesivir  He will be discharged to [facility]  He also suffers with dementia and offers very minimal verbal response  Otherwise he has remained stable throughout his hospital stay  Condition at Discharge: stable    Discharge Day Visit / Exam:   Subjective:  Denies complaints when asked of him  Offers no complaints of chest pain or shortness of breath when asked directly  No overnight events reported by his nurse  Vitals: Blood Pressure: 142/77 (09/16/21 0725)  Pulse: 62 (09/16/21 0725)  Temperature: (!) 96 6 °F (35 9 °C) (09/16/21 0725)  Temp Source: Oral (09/16/21 0725)  Respirations: 20 (09/16/21 0725)  Height: 6' (182 9 cm) (09/13/21 0950)  Weight - Scale: 79 kg (174 lb 2 6 oz) (09/12/21 1406)  SpO2: 94 % (09/16/21 0725)  Exam:   Physical Exam  HENT:      Head: Normocephalic  Nose: Nose normal    Cardiovascular:      Rate and Rhythm: Normal rate and regular rhythm  Heart sounds: No murmur heard  Pulmonary:      Effort: Pulmonary effort is normal       Breath sounds: Rhonchi present  Abdominal:      Palpations: Abdomen is soft  Musculoskeletal:         General: No swelling  Normal range of motion  Cervical back: Normal range of motion  Skin:     General: Skin is warm and dry  Capillary Refill: Capillary refill takes less than 2 seconds  Neurological:      General: No focal deficit present  Mental Status: He is alert     Psychiatric:         Mood and Affect: Mood normal             Discussion with Family:  Left message for daughter in-law Ruslan Butt     Medication Adjustments and Discharge Medications:  · Discharge Medication List: See after visit summary for reconciled discharge medications  · Medication Dosing Tapers - Please refer to Discharge Medication List for details on any medication dosing tapers (if applicable to patient)  · Summary of Medication Adjustments made as a result of this hospitalization:  Held metformin, no longer on losartan  · Medications being temporarily held (include recommended restart time):  Not applicable    Wound Care Recommendations:  When applicable, please see wound care section of After Visit Summary  Diet Recommendations at Discharge:  Diet -        Diet Orders   (From admission, onward)             Start     Ordered    09/14/21 1554  Diet Ren/CHO Controlled; Consistent Carbohydrate Diet Level 2 (5 carb servings/75 grams CHO/meal)  Diet effective now     Question Answer Comment   Diet Type Ren/CHO Controlled    Ren/CHO Controlled Consistent Carbohydrate Diet Level 2 (5 carb servings/75 grams CHO/meal)    RD to adjust diet per protocol? Yes        09/14/21 1553    09/13/21 1537  Dietary nutrition supplements  Once     Question Answer Comment   Select Supplement: Glucerna-Chocolate    Frequency Dinner        09/13/21 1536    09/13/21 1536  Dietary nutrition supplements  Once     Question Answer Comment   Select Supplement: Dorise Lint    Frequency Breakfast        09/13/21 1536    09/13/21 1536  Dietary nutrition supplements  Once     Question Answer Comment   Select Supplement: Junius Pare    Frequency Lunch        09/13/21 1536                Goals of Care Discussions:  · Code Status at Discharge: Level 3 - DNAR and DNI  · Goals of care were not discussed during this admission  Discharge instructions/Information to patient and family:   See after visit summary section titled Discharge Instructions for information provided to patient and family        Planned Readmission: Not anticipated      Discharge Statement:  I spent 40 minutes discharging the patient  This time was spent on the day of discharge  I had direct contact with the patient on the day of discharge  Greater than 50% of the total time was spent examining patient, answering all patient questions, arranging and discussing plan of care with patient as well as directly providing post-discharge instructions  Additional time then spent on discharge activities  **Please Note: This note may have been constructed using a voice recognition system  **

## 2021-09-16 NOTE — PROGRESS NOTES
Report called to [facility]  Patient transported 1240  Patient on room air at time of transport and sats were 92 percent

## 2021-09-17 ENCOUNTER — NURSING HOME VISIT (OUTPATIENT)
Dept: GERIATRICS | Facility: OTHER | Age: 86
End: 2021-09-17
Payer: MEDICARE

## 2021-09-17 VITALS
SYSTOLIC BLOOD PRESSURE: 128 MMHG | OXYGEN SATURATION: 93 % | TEMPERATURE: 97.5 F | DIASTOLIC BLOOD PRESSURE: 67 MMHG | RESPIRATION RATE: 18 BRPM | HEART RATE: 76 BPM

## 2021-09-17 DIAGNOSIS — U07.1 COVID-19: Primary | ICD-10-CM

## 2021-09-17 DIAGNOSIS — R26.2 AMBULATORY DYSFUNCTION: ICD-10-CM

## 2021-09-17 DIAGNOSIS — R10.84 GENERALIZED ABDOMINAL PAIN: ICD-10-CM

## 2021-09-17 DIAGNOSIS — E11.69 TYPE 2 DIABETES MELLITUS WITH OTHER SPECIFIED COMPLICATION, WITHOUT LONG-TERM CURRENT USE OF INSULIN (HCC): ICD-10-CM

## 2021-09-17 DIAGNOSIS — F02.80 ALZHEIMER'S DEMENTIA WITHOUT BEHAVIORAL DISTURBANCE, UNSPECIFIED TIMING OF DEMENTIA ONSET (HCC): ICD-10-CM

## 2021-09-17 DIAGNOSIS — N18.32 STAGE 3B CHRONIC KIDNEY DISEASE (HCC): ICD-10-CM

## 2021-09-17 DIAGNOSIS — G30.9 ALZHEIMER'S DEMENTIA WITHOUT BEHAVIORAL DISTURBANCE, UNSPECIFIED TIMING OF DEMENTIA ONSET (HCC): ICD-10-CM

## 2021-09-17 PROCEDURE — 99305 1ST NF CARE MODERATE MDM 35: CPT | Performed by: FAMILY MEDICINE

## 2021-09-17 NOTE — ASSESSMENT & PLAN NOTE
Lab Results   Component Value Date    EGFR 42 09/16/2021    EGFR 39 09/14/2021    EGFR 40 09/13/2021    CREATININE 1 45 (H) 09/16/2021    CREATININE 1 53 (H) 09/14/2021    CREATININE 1 51 (H) 09/13/2021     Creatinine stable  Will avoid nephrotoxic medication  Encourage po hydration  Will monitor BMP

## 2021-09-17 NOTE — PROGRESS NOTES
[facility]  History and Physical  POS: 31    Records Reviewed include: Hospital records      Chief Complaint/ Reason for Admission:   COVID infection    History of Present Illness:      Mr Opal Gu is a 79 yo male who was recently admitted to [facility] due to COVID infection, currently stable he is admitted to [address] [place] [address] for [address]  Patient with advanced dementia not able to provide history  Cough noted during the exam as well as abdominal tenderness  Will continue to monitor, check bladder scan, optimize bowel regimen, check CBC, CMP  Continue to provide supportive care, encourage oOB as tolerated and po intake  No behaviors or difficulty sleeping reported by the staff Will continue PT/OT/ST, goal is to return to his facility  Unable to obtain history from patient due to dementia  History was obtained from the medical records / medical staff  Allergies    Allergies   Allergen Reactions    Other Swelling     Bee stings       Past Medical History  Past Medical History:   Diagnosis Date    Anemia     getting venofer once a week       Arthritis     Cancer ([facility] [address] )     NOSE    Coronary artery disease     carotid stents bilateral    Diabetes mellitus ([facility] [address] )     Hearing aid worn     BILATERAL    History of transfusion 10/11/2015    Hyperlipidemia     Hypertension     Spinal stenosis     hx of L-ROHAN        Past Surgical History:   Procedure Laterality Date    APPENDECTOMY      CAROTID STENT Bilateral     CATARACT EXTRACTION W/ INTRAOCULAR LENS IMPLANT Left 2013    CHOLECYSTECTOMY      CYSTOSCOPY W/ URETERAL STENT PLACEMENT Right     CYSTOSCOPY W/ URETERAL STENT REMOVAL      FEMORAL ARTERY STENT  2013    HEMORRHOID SURGERY      HERNIA REPAIR      HIP ARTHROPLASTY Left 2013    JOINT REPLACEMENT Left 2012    DC TOTAL HIP ARTHROPLASTY Right 2/1/2016    Procedure: ARTHROPLASTY HIP TOTAL;  Surgeon: Mendel Perez MD;  Location: [address] [place] [address];  Service: Orthopedics    SKIN BIOPSY      removal of skin cancer nose    TONSILLECTOMY         Family History  Non contributory    Social History  Social History     Tobacco Use   Smoking Status Former Smoker    Packs/day: 1 50    Years: 20 00    Pack years: 30 00    Quit date: 1996    Years since quittin 6   Smokeless Tobacco Never Used      Social History     Substance and Sexual Activity   Alcohol Use Yes    Comment: occ      Social History     Substance and Sexual Activity   Drug Use No        Lives: [place],  Social Support: family    Use of assistance Device: Wheelchair    Physical Exam    Vital Signs    Vitals:    21 1840   BP: 128/67   Pulse: 76   Resp: 18   Temp: 97 5 °F (36 4 °C)   SpO2: 93%           Constitutional: Frail appearing patient       Physical Exam  Vitals and nursing note reviewed  Constitutional:       General: He is not in acute distress  Appearance: He is not diaphoretic  HENT:      Head: Normocephalic and atraumatic  Ears:      Comments: [place]     Mouth/Throat:      Mouth: Mucous membranes are dry  Eyes:      General:         Right eye: No discharge  Left eye: No discharge  Pupils: Pupils are equal, round, and reactive to light  Cardiovascular:      Rate and Rhythm: Normal rate and regular rhythm  Heart sounds: Heart sounds are distant  No murmur heard  Pulmonary:      Effort: Pulmonary effort is normal  No respiratory distress  Breath sounds: No wheezing  Comments: Decreased breath sounds, not taking deep breaths  Abdominal:      Palpations: Abdomen is soft  Tenderness: There is abdominal tenderness  There is no guarding or rebound  Musculoskeletal:      Cervical back: Normal range of motion and neck supple  Right lower leg: No edema  Left lower leg: No edema  Skin:     General: Skin is warm and dry  Neurological:      Mental Status: He is alert        Comments: AAOx0   Psychiatric:         Behavior: Behavior normal          Review of Systems:  Review of Systems   Unable to perform ROS: Dementia       List of Current Medications:    Medication reviewed  All orders signed  Complete list is in the paper chart  Allergies    Allergies   Allergen Reactions    Other Swelling     Bee stings       Labs/Diagnostics (reviewed by this provider): I personally reviewed lab results and imaging studies  Full reports are in the paper chart  Assessment/Plan:    COVID-19  Currently stable, mild cough noted  Will continue supportive care  Monitor VS and CBC, CMP  Encourage po intake  Encourage oOB as tolerated and continue pT/OT    Stage 3b chronic kidney disease [facility])  Lab Results   Component Value Date    EGFR 42 09/16/2021    EGFR 39 09/14/2021    EGFR 40 09/13/2021    CREATININE 1 45 (H) 09/16/2021    CREATININE 1 53 (H) 09/14/2021    CREATININE 1 51 (H) 09/13/2021     Creatinine stable  Will avoid nephrotoxic medication  Encourage po hydration  Will monitor BMP  Dementia ([address] )  Stable , no behavioral issues noted  Will continue supportive care  Maintain sleep Alexandra Stakes cycle  Maintain good po  hydration, avoid constipation  Discontinued donepezil as at this stage in the disease it's most likely not beneficial    Type 2 diabetes mellitus, without long-term current use of insulin ([facility])    Lab Results   Component Value Date    HGBA1C 9 5 (H) 08/27/2021     Will continue same regimen for now, continue to monitor BG  Check TSH and B 12 level    Ambulatory dysfunction  Will continue PT/OT  Placed on fall precautions  The goal is to return to LTC    Generalized abdominal pain  Patient is not able to provide history due to advanced dementia  Tenderness noted on exam   Will optimize bowel regimen  Check bladder scan         Pain: abdomnail tenderness on exam  Rehab Potential:Good    Discharge Plan: LTC  Vaccination:   Immunization History   Administered Date(s) Administered    Influenza, high dose seasonal 0 7 mL 10/05/2020     Advanced Directives: yes: Yes   Code status:DNR (Per discussion with resident or POA)  PCP: Lexx Liu MD  Geriatric Medicine  1/18/63187:61 PM

## 2021-09-17 NOTE — ASSESSMENT & PLAN NOTE
Stable , no behavioral issues noted  Will continue supportive care  Maintain sleep Shilpi Lemmings cycle  Maintain good po  hydration, avoid constipation    Discontinued donepezil as at this stage in the disease it's most likely not beneficial

## 2021-09-17 NOTE — ASSESSMENT & PLAN NOTE
Currently stable, mild cough noted  Will continue supportive care  Monitor VS and CBC, CMP  Encourage po intake    Encourage oOB as tolerated and continue pT/OT

## 2021-09-17 NOTE — ASSESSMENT & PLAN NOTE
Patient is not able to provide history due to advanced dementia  Tenderness noted on exam   Will optimize bowel regimen  Check bladder scan

## 2021-09-17 NOTE — ASSESSMENT & PLAN NOTE
Lab Results   Component Value Date    HGBA1C 9 5 (H) 08/27/2021     Will continue same regimen for now, continue to monitor BG    Check TSH and B 12 level

## 2021-09-20 ENCOUNTER — TELEPHONE (OUTPATIENT)
Dept: OTHER | Facility: OTHER | Age: 86
End: 2021-09-20

## 2021-09-20 NOTE — TELEPHONE ENCOUNTER
Jerry[le form [facility] called requesting a call back from on call provider regarding pt's unwitnessed fall  Information was sent to provider via TC

## 2021-09-22 ENCOUNTER — NURSING HOME VISIT (OUTPATIENT)
Dept: GERIATRICS | Facility: OTHER | Age: 86
End: 2021-09-22
Payer: MEDICARE

## 2021-09-22 DIAGNOSIS — R26.2 AMBULATORY DYSFUNCTION: ICD-10-CM

## 2021-09-22 DIAGNOSIS — F02.80 ALZHEIMER'S DEMENTIA WITHOUT BEHAVIORAL DISTURBANCE, UNSPECIFIED TIMING OF DEMENTIA ONSET (HCC): Primary | ICD-10-CM

## 2021-09-22 DIAGNOSIS — U07.1 COVID-19: ICD-10-CM

## 2021-09-22 DIAGNOSIS — I10 ESSENTIAL HYPERTENSION: ICD-10-CM

## 2021-09-22 DIAGNOSIS — N18.32 STAGE 3B CHRONIC KIDNEY DISEASE (HCC): ICD-10-CM

## 2021-09-22 DIAGNOSIS — G30.9 ALZHEIMER'S DEMENTIA WITHOUT BEHAVIORAL DISTURBANCE, UNSPECIFIED TIMING OF DEMENTIA ONSET (HCC): Primary | ICD-10-CM

## 2021-09-22 PROCEDURE — 99309 SBSQ NF CARE MODERATE MDM 30: CPT | Performed by: NURSE PRACTITIONER

## 2021-09-22 NOTE — PROGRESS NOTES
[address]  [address]  [phone]  [place]  Code 31 (STR)      NAME: Marley Dandy  AGE: 80 y o  SEX: male CODE STATUS:  DNR/DNI    DATE OF ENCOUNTER: 9/22/2021    Assessment and Plan     1  Alzheimer's dementia without behavioral disturbance, unspecified timing of dementia onset ([facility] [address] )  Assessment & Plan:  · Stable  · Alert to self only- able to follow simple commands  · Per staff he yells at times and is resistance to care  · Psych consult for behaviors done at facility   · Was started on BuSpar, Remeron, mirtazapine with reduction in behaviors   · Continue risperidone 0 25 b i d   · Continue Buspirone 5 mg 2 times a day  · Continue mirtazapine 15 mg at bedtime  · Tylenol p r n  Provide redirection, reorientation, distraction techniques  Continue fall precautions at facility  Continue to assist with ADLs at facility   Avoid deliriogenic medications such as tramadol, benzodiazepines, anticholinergics, Benadryl  Encourage Hydration/ Nutrition  Implement sleep hygiene, limit night time interuptions,   Encourage participation in group activities when appropriate         2   Essential hypertension  Assessment & Plan:  · Stable   · SBP ranging 115-140  · HR ranging 60-90  · Also obtained recently discontinued and started on midodrine   · Continue midodrine 2 5 mg 3 times a day hold if systolic blood pressure greater than 140  · Continue metoprolol succinate 25 mg daily  · Continue Lipitor 40 mg daily      3  COVID-19  Assessment & Plan:  · Recently hospitalized for COVID-19  · Patient had a been vaccinated prior to subsequent infection  · Treated with Remdesivir, Decadron and Lovenox for dvt prevention  · Stable  · Patient no longer requires oxygen  · No cough noted on exam  · Respirations even and nonlabored   · Vital signs stable labs within normal limits  · Continue supportive care   · Encourage p o  intake  · Encourage patient out of bed for meals  · Continue vitamin-D 2000 units daily      4  Ambulatory dysfunction  Assessment & Plan:  · In the setting of advanced age, multiple comorbidities including advanced dementia, recent COVID-23 infection, debility  · Per review of records patient was ambulatory with a rolling walker prior to short-term rehab   · Patient now requires mod to max assist with ADLs/IADLs  · Continue to assist with ADLs/IADLs   · Fall precautions  · Adequate p o  nutrition/hydration  · Previously resided at [facility] per  patient cannot return there   ·  following for discharge planning potential DC to [facility]      5  Stage 3b chronic kidney disease [facility])  Assessment & Plan:  Lab Results   Component Value Date    EGFR 42 09/16/2021    EGFR 39 09/14/2021    EGFR 40 09/13/2021    CREATININE 1 45 (H) 09/16/2021    CREATININE 1 53 (H) 09/14/2021    CREATININE 1 51 (H) 09/13/2021     · Repeat BMP reviewed from 9/22/2021  · BUN/creatinine 31/1 19 GFR 53  · Stable   · Renally dose medications   · Avoid hypotension  · Continue midodrine   · Encourage p o  hydration         All medications and routine orders were reviewed and updated as needed  Chief Complaint     STR follow up visit      History of Present Illness     Brendan Freeman is a 80-year-old male with a past medical history including but not limited to DM type 2, HTN, dementia, CKD stage 3, COVID-19 home a ambulatory dysfunction seen in collaboration with nursing for medical management and short-term rehab follow-up visit  Patient was recently hospitalized at [facility] in from September 12th to 9/16/2020 along with a diagnosis of COVID-19  He was initially positive for COVID-19 on 9/3/2021  Patient resides at [facility] staff had found him to be lethargic hypoxic and had ambulatory difficulty which was unusual for him  Patient required 3 L nasal cannula on presentation to ED  Patient has been vaccinated for COVID-19    He was treated with remdesivir and Decadron, Lovenox  Patient is a poor historian due to advanced dementia and is unable to provide a reliable history  Patient is alert to name only  Per staff patient is often yelling out and fight staff and throat when giving care  No cough appreciated during exam   Patient is respirations appear even Unit on hearing male Ativan behaviors no concerns from nursing at this time  Per review of facility record patient is only eating 1-2 meals per day and consuming only 50% to 75%  He is incontinent of bowel and bladder LBM 9/21/21 x 2        The patient's allergies, past medical, surgical, social and family history were reviewed and unchanged  Review of Systems     Review of Systems   Unable to perform ROS: Dementia         Objective     Vitals:  Temp 97 7° pulse 74 respirations 20 /64 SpO2 95% weight 173 lb    Physical Exam  Vitals and nursing note reviewed  Constitutional:       General: He is not in acute distress  Appearance: Normal appearance  Comments: Frail elderly male disheveled appearance resting in bed   HENT:      Head: Normocephalic and atraumatic  Nose: No congestion or rhinorrhea  Mouth/Throat:      Mouth: Mucous membranes are dry  Eyes:      General: No scleral icterus  Extraocular Movements: Extraocular movements intact  Conjunctiva/sclera: Conjunctivae normal       Pupils: Pupils are equal, round, and reactive to light  Cardiovascular:      Rate and Rhythm: Normal rate and regular rhythm  Pulses: Normal pulses  Heart sounds: Murmur heard  Pulmonary:      Effort: Pulmonary effort is normal       Breath sounds: Normal breath sounds  No wheezing, rhonchi or rales  Comments: Diminished but clear breath sounds poor effort  Abdominal:      General: Bowel sounds are normal  There is no distension  Palpations: Abdomen is soft  Tenderness: There is no abdominal tenderness     Musculoskeletal:         General: No swelling or signs of injury  Lymphadenopathy:      Cervical: No cervical adenopathy  Skin:     General: Skin is warm and dry  Findings: No erythema  Neurological:      General: No focal deficit present  Mental Status: He is alert  He is disoriented  GCS: GCS eye subscore is 4  GCS verbal subscore is 3  GCS motor subscore is 5  Sensory: No sensory deficit  Motor: Weakness present  Gait: Gait abnormal       Comments: Alert and oriented to self only   Psychiatric:         Attention and Perception: He is inattentive  Mood and Affect: Mood is anxious (Mildly anxious mood redirectable)  Behavior: Behavior normal          Cognition and Memory: Cognition is impaired  Memory is impaired  Pertinent Laboratory/Diagnostic Studies:   Reviewed in facility chart-stable    Current Medications   Medications reviewed and updated see facility [facility] for details  Current Outpatient Medications:     busPIRone (BUSPAR) 5 mg tablet, Take 5 mg by mouth 2 (two) times a day Anxiety, Disp: , Rfl:     mirtazapine (REMERON) 15 mg tablet, Take 15 mg by mouth daily at bedtime Depression, Disp: , Rfl:     risperiDONE (RisperDAL) 0 25 mg tablet, Take 0 25 mg by mouth 2 (two) times a day Psychosis, Disp: , Rfl:     acetaminophen (TYLENOL) 325 mg tablet, Take 650 mg by mouth every 6 (six) hours as needed for mild pain (Patient not taking: Reported on 9/12/2021), Disp: , Rfl:     atorvastatin (LIPITOR) 40 mg tablet, Take 40 mg by mouth daily at bedtime   (Patient not taking: Reported on 9/12/2021), Disp: , Rfl:     Cholecalciferol (VITAMIN D) 2000 UNITS tablet, Take 2,000 Units by mouth daily in the early morning , Disp: , Rfl:     clopidogrel (PLAVIX) 75 mg tablet, Take 1 tablet (75 mg total) by mouth daily at bedtime, Disp:  , Rfl: 0    folic acid (FOLVITE) 318 MCG tablet, Take 400 mcg by mouth daily in the early morning , Disp: , Rfl:     insulin glargine (LANTUS) 100 units/mL subcutaneous injection, Inject 12 Units under the skin daily at bedtime, Disp: 10 mL, Rfl: 0    metoprolol succinate (TOPROL-XL) 25 mg 24 hr tablet, Take 25 mg by mouth daily, Disp: , Rfl:     midodrine (PROAMATINE) 2 5 mg tablet, Take 1 tablet by mouth 3 (three) times a day, Disp: , Rfl:     nateglinide (STARLIX) 120 mg tablet, Take 120 mg by mouth daily 30 min daily before meals, Disp: , Rfl:     tamsulosin (FLOMAX) 0 4 mg, 0 4 mg daily with dinner , Disp: , Rfl:     VASCEPA 1 g CAPS, TAKE ONE CAPSULE BY MOUTH TWICE DAILY WITH FOOD SWALLOWING WHOLE  DO NOT CHEW, OPEN, DISSOVE AND/OR CRUSH  (Patient not taking: Reported on 9/12/2021), Disp: , Rfl:     VITRON-C  MG TABS, 2 (two) times a day  (Patient not taking: Reported on 9/12/2021), Disp: , Rfl:      Plan discussed with Dr Aydee Beltran noted agreement with assessment and plan  Please note:  Voice-recognition software may have been used in the preparation of this document  Occasional wrong word or "sound-alike" substitutions may have occurred due to the inherent limitations of voice recognition software  Interpretation should be guided by ANGELIKA Herbert  9/22/2021  8:42 AM

## 2021-09-24 RX ORDER — MIRTAZAPINE 15 MG/1
15 TABLET, FILM COATED ORAL
COMMUNITY

## 2021-09-24 RX ORDER — BUSPIRONE HYDROCHLORIDE 5 MG/1
5 TABLET ORAL 2 TIMES DAILY
COMMUNITY

## 2021-09-24 RX ORDER — RISPERIDONE 0.25 MG/1
0.25 TABLET, FILM COATED ORAL 2 TIMES DAILY
COMMUNITY
End: 2021-10-05 | Stop reason: HOSPADM

## 2021-09-24 NOTE — ASSESSMENT & PLAN NOTE
· In the setting of advanced age, multiple comorbidities including advanced dementia, recent [address]12 infection, debility  · Per review of records patient was ambulatory with a rolling walker prior to short-term rehab   · Patient now requires mod to max assist with ADLs/IADLs  · Continue to assist with ADLs/IADLs   · Fall precautions  · Adequate p o  nutrition/hydration  · Previously resided at [facility] per  patient cannot return there   ·  following for discharge planning potential DC to [facility]

## 2021-09-24 NOTE — ASSESSMENT & PLAN NOTE
· Recently hospitalized for COVID-19  · Patient had a been vaccinated prior to subsequent infection  · Treated with Remdesivir, Decadron and Lovenox for dvt prevention  · Stable  · Patient no longer requires oxygen  · No cough noted on exam  · Respirations even and nonlabored   · Vital signs stable labs within normal limits  · Continue supportive care   · Encourage p o  intake  · Encourage patient out of bed for meals  · Continue vitamin-D 2000 units daily

## 2021-09-24 NOTE — ASSESSMENT & PLAN NOTE
· Stable  · Alert to self only- able to follow simple commands  · Per staff he yells at times and is resistance to care  · Psych consult for behaviors done at facility   · Was started on BuSpar, Remeron, mirtazapine with reduction in behaviors   · Continue risperidone 0 25 b i d   · Continue Buspirone 5 mg 2 times a day  · Continue mirtazapine 15 mg at bedtime  · Tylenol p r n    Provide redirection, reorientation, distraction techniques  Continue fall precautions at facility  Continue to assist with ADLs at facility   Avoid deliriogenic medications such as tramadol, benzodiazepines, anticholinergics, Benadryl  Encourage Hydration/ Nutrition  Implement sleep hygiene, limit night time interuptions,   Encourage participation in group activities when appropriate

## 2021-09-24 NOTE — ASSESSMENT & PLAN NOTE
Lab Results   Component Value Date    EGFR 42 09/16/2021    EGFR 39 09/14/2021    EGFR 40 09/13/2021    CREATININE 1 45 (H) 09/16/2021    CREATININE 1 53 (H) 09/14/2021    CREATININE 1 51 (H) 09/13/2021     · Repeat BMP reviewed from 9/22/2021  · BUN/creatinine 31/1 19 GFR 53  · Stable   · Renally dose medications   · Avoid hypotension  · Continue midodrine   · Encourage p o  hydration

## 2021-09-24 NOTE — ASSESSMENT & PLAN NOTE
· Stable   · SBP ranging 115-140  · HR ranging 60-90  · Also obtained recently discontinued and started on midodrine   · Continue midodrine 2 5 mg 3 times a day hold if systolic blood pressure greater than 140  · Continue metoprolol succinate 25 mg daily  · Continue Lipitor 40 mg daily

## 2021-09-29 ENCOUNTER — TELEPHONE (OUTPATIENT)
Dept: OTHER | Facility: OTHER | Age: 86
End: 2021-09-29

## 2021-09-29 ENCOUNTER — APPOINTMENT (EMERGENCY)
Dept: CT IMAGING | Facility: HOSPITAL | Age: 86
DRG: 177 | End: 2021-09-29
Payer: MEDICARE

## 2021-09-29 ENCOUNTER — APPOINTMENT (EMERGENCY)
Dept: RADIOLOGY | Facility: HOSPITAL | Age: 86
DRG: 177 | End: 2021-09-29
Payer: MEDICARE

## 2021-09-29 ENCOUNTER — HOSPITAL ENCOUNTER (INPATIENT)
Facility: HOSPITAL | Age: 86
LOS: 6 days | Discharge: NURSING, REHAB OR LONG-TERM CARE | DRG: 177 | End: 2021-10-05
Attending: EMERGENCY MEDICINE
Payer: MEDICARE

## 2021-09-29 DIAGNOSIS — N17.9 AKI (ACUTE KIDNEY INJURY) (HCC): ICD-10-CM

## 2021-09-29 DIAGNOSIS — N17.9 ACUTE KIDNEY INJURY SUPERIMPOSED ON CHRONIC KIDNEY DISEASE (HCC): ICD-10-CM

## 2021-09-29 DIAGNOSIS — N18.9 ACUTE KIDNEY INJURY SUPERIMPOSED ON CHRONIC KIDNEY DISEASE (HCC): ICD-10-CM

## 2021-09-29 DIAGNOSIS — J18.9 MULTIFOCAL PNEUMONIA: ICD-10-CM

## 2021-09-29 DIAGNOSIS — F03.90 DEMENTIA (HCC): Primary | ICD-10-CM

## 2021-09-29 LAB
ALBUMIN SERPL BCP-MCNC: 2.8 G/DL (ref 3.5–5)
ALP SERPL-CCNC: 109 U/L (ref 46–116)
ALT SERPL W P-5'-P-CCNC: 24 U/L (ref 12–78)
ANION GAP SERPL CALCULATED.3IONS-SCNC: 6 MMOL/L (ref 4–13)
AST SERPL W P-5'-P-CCNC: 35 U/L (ref 5–45)
ATRIAL RATE: 129 BPM
BASOPHILS # BLD AUTO: 0.05 THOUSANDS/ΜL (ref 0–0.1)
BASOPHILS NFR BLD AUTO: 0 % (ref 0–1)
BILIRUB SERPL-MCNC: 0.75 MG/DL (ref 0.2–1)
BUN SERPL-MCNC: 37 MG/DL (ref 5–25)
CALCIUM ALBUM COR SERPL-MCNC: 9.7 MG/DL (ref 8.3–10.1)
CALCIUM SERPL-MCNC: 8.7 MG/DL (ref 8.3–10.1)
CHLORIDE SERPL-SCNC: 104 MMOL/L (ref 100–108)
CO2 SERPL-SCNC: 28 MMOL/L (ref 21–32)
CREAT SERPL-MCNC: 2.01 MG/DL (ref 0.6–1.3)
EOSINOPHIL # BLD AUTO: 0.06 THOUSAND/ΜL (ref 0–0.61)
EOSINOPHIL NFR BLD AUTO: 0 % (ref 0–6)
ERYTHROCYTE [DISTWIDTH] IN BLOOD BY AUTOMATED COUNT: 14.1 % (ref 11.6–15.1)
GFR SERPL CREATININE-BSD FRML MDRD: 28 ML/MIN/1.73SQ M
GLUCOSE SERPL-MCNC: 168 MG/DL (ref 65–140)
GLUCOSE SERPL-MCNC: 173 MG/DL (ref 65–140)
GLUCOSE SERPL-MCNC: 185 MG/DL (ref 65–140)
GLUCOSE SERPL-MCNC: 244 MG/DL (ref 65–140)
HCT VFR BLD AUTO: 38 % (ref 36.5–49.3)
HGB BLD-MCNC: 12.4 G/DL (ref 12–17)
IMM GRANULOCYTES # BLD AUTO: 0.33 THOUSAND/UL (ref 0–0.2)
IMM GRANULOCYTES NFR BLD AUTO: 2 % (ref 0–2)
LYMPHOCYTES # BLD AUTO: 0.93 THOUSANDS/ΜL (ref 0.6–4.47)
LYMPHOCYTES NFR BLD AUTO: 6 % (ref 14–44)
MCH RBC QN AUTO: 30.3 PG (ref 26.8–34.3)
MCHC RBC AUTO-ENTMCNC: 32.6 G/DL (ref 31.4–37.4)
MCV RBC AUTO: 93 FL (ref 82–98)
MONOCYTES # BLD AUTO: 0.97 THOUSAND/ΜL (ref 0.17–1.22)
MONOCYTES NFR BLD AUTO: 6 % (ref 4–12)
NEUTROPHILS # BLD AUTO: 13.14 THOUSANDS/ΜL (ref 1.85–7.62)
NEUTS SEG NFR BLD AUTO: 86 % (ref 43–75)
NRBC BLD AUTO-RTO: 0 /100 WBCS
PLATELET # BLD AUTO: 256 THOUSANDS/UL (ref 149–390)
PMV BLD AUTO: 10.4 FL (ref 8.9–12.7)
POTASSIUM SERPL-SCNC: 4.8 MMOL/L (ref 3.5–5.3)
PROT SERPL-MCNC: 7.4 G/DL (ref 6.4–8.2)
QRS AXIS: -48 DEGREES
QRSD INTERVAL: 136 MS
QT INTERVAL: 326 MS
QTC INTERVAL: 424 MS
RBC # BLD AUTO: 4.09 MILLION/UL (ref 3.88–5.62)
SODIUM SERPL-SCNC: 138 MMOL/L (ref 136–145)
T WAVE AXIS: 38 DEGREES
TSH SERPL DL<=0.05 MIU/L-ACNC: 3.29 UIU/ML (ref 0.36–3.74)
VENTRICULAR RATE: 102 BPM
WBC # BLD AUTO: 15.48 THOUSAND/UL (ref 4.31–10.16)

## 2021-09-29 PROCEDURE — 82948 REAGENT STRIP/BLOOD GLUCOSE: CPT

## 2021-09-29 PROCEDURE — 84443 ASSAY THYROID STIM HORMONE: CPT | Performed by: PHYSICIAN ASSISTANT

## 2021-09-29 PROCEDURE — 96360 HYDRATION IV INFUSION INIT: CPT

## 2021-09-29 PROCEDURE — 87040 BLOOD CULTURE FOR BACTERIA: CPT | Performed by: PHYSICIAN ASSISTANT

## 2021-09-29 PROCEDURE — 36415 COLL VENOUS BLD VENIPUNCTURE: CPT | Performed by: PHYSICIAN ASSISTANT

## 2021-09-29 PROCEDURE — 99285 EMERGENCY DEPT VISIT HI MDM: CPT | Performed by: PHYSICIAN ASSISTANT

## 2021-09-29 PROCEDURE — G1004 CDSM NDSC: HCPCS

## 2021-09-29 PROCEDURE — 71045 X-RAY EXAM CHEST 1 VIEW: CPT

## 2021-09-29 PROCEDURE — 70450 CT HEAD/BRAIN W/O DYE: CPT

## 2021-09-29 PROCEDURE — 99223 1ST HOSP IP/OBS HIGH 75: CPT | Performed by: STUDENT IN AN ORGANIZED HEALTH CARE EDUCATION/TRAINING PROGRAM

## 2021-09-29 PROCEDURE — 99285 EMERGENCY DEPT VISIT HI MDM: CPT

## 2021-09-29 PROCEDURE — 85025 COMPLETE CBC W/AUTO DIFF WBC: CPT | Performed by: PHYSICIAN ASSISTANT

## 2021-09-29 PROCEDURE — 93010 ELECTROCARDIOGRAM REPORT: CPT | Performed by: INTERNAL MEDICINE

## 2021-09-29 PROCEDURE — 80053 COMPREHEN METABOLIC PANEL: CPT | Performed by: PHYSICIAN ASSISTANT

## 2021-09-29 PROCEDURE — 93005 ELECTROCARDIOGRAM TRACING: CPT

## 2021-09-29 RX ORDER — BUSPIRONE HYDROCHLORIDE 5 MG/1
5 TABLET ORAL 2 TIMES DAILY
Status: DISCONTINUED | OUTPATIENT
Start: 2021-09-29 | End: 2021-10-05 | Stop reason: HOSPADM

## 2021-09-29 RX ORDER — LANOLIN ALCOHOL/MO/W.PET/CERES
400 CREAM (GRAM) TOPICAL
Status: DISCONTINUED | OUTPATIENT
Start: 2021-09-29 | End: 2021-10-05 | Stop reason: HOSPADM

## 2021-09-29 RX ORDER — TAMSULOSIN HYDROCHLORIDE 0.4 MG/1
0.4 CAPSULE ORAL
Status: DISCONTINUED | OUTPATIENT
Start: 2021-09-29 | End: 2021-10-05 | Stop reason: HOSPADM

## 2021-09-29 RX ORDER — METOPROLOL SUCCINATE 25 MG/1
25 TABLET, EXTENDED RELEASE ORAL DAILY
Status: DISCONTINUED | OUTPATIENT
Start: 2021-09-29 | End: 2021-09-29

## 2021-09-29 RX ORDER — SODIUM CHLORIDE 9 MG/ML
75 INJECTION, SOLUTION INTRAVENOUS CONTINUOUS
Status: DISPENSED | OUTPATIENT
Start: 2021-09-29 | End: 2021-10-03

## 2021-09-29 RX ORDER — HEPARIN SODIUM 5000 [USP'U]/ML
5000 INJECTION, SOLUTION INTRAVENOUS; SUBCUTANEOUS EVERY 8 HOURS SCHEDULED
Status: DISCONTINUED | OUTPATIENT
Start: 2021-09-29 | End: 2021-10-05 | Stop reason: HOSPADM

## 2021-09-29 RX ORDER — LORAZEPAM 2 MG/ML
0.5 INJECTION INTRAMUSCULAR EVERY 6 HOURS PRN
Status: DISCONTINUED | OUTPATIENT
Start: 2021-09-29 | End: 2021-10-05 | Stop reason: HOSPADM

## 2021-09-29 RX ORDER — CLOPIDOGREL BISULFATE 75 MG/1
75 TABLET ORAL
Status: DISCONTINUED | OUTPATIENT
Start: 2021-09-29 | End: 2021-10-05 | Stop reason: HOSPADM

## 2021-09-29 RX ORDER — ACETAMINOPHEN 325 MG/1
650 TABLET ORAL EVERY 6 HOURS PRN
Status: DISCONTINUED | OUTPATIENT
Start: 2021-09-29 | End: 2021-10-05 | Stop reason: HOSPADM

## 2021-09-29 RX ORDER — AMOXICILLIN 250 MG
1 CAPSULE ORAL DAILY
COMMUNITY

## 2021-09-29 RX ORDER — RISPERIDONE 0.25 MG/1
0.25 TABLET, FILM COATED ORAL 2 TIMES DAILY
Status: DISCONTINUED | OUTPATIENT
Start: 2021-09-29 | End: 2021-09-30

## 2021-09-29 RX ORDER — ONDANSETRON 2 MG/ML
4 INJECTION INTRAMUSCULAR; INTRAVENOUS EVERY 6 HOURS PRN
Status: DISCONTINUED | OUTPATIENT
Start: 2021-09-29 | End: 2021-10-05 | Stop reason: HOSPADM

## 2021-09-29 RX ORDER — MIRTAZAPINE 15 MG/1
15 TABLET, FILM COATED ORAL
Status: DISCONTINUED | OUTPATIENT
Start: 2021-09-29 | End: 2021-10-05 | Stop reason: HOSPADM

## 2021-09-29 RX ORDER — INSULIN GLARGINE 100 [IU]/ML
12 INJECTION, SOLUTION SUBCUTANEOUS
Status: DISCONTINUED | OUTPATIENT
Start: 2021-09-29 | End: 2021-10-05 | Stop reason: HOSPADM

## 2021-09-29 RX ORDER — MIDODRINE HYDROCHLORIDE 2.5 MG/1
2.5 TABLET ORAL 3 TIMES DAILY
Status: DISCONTINUED | OUTPATIENT
Start: 2021-09-29 | End: 2021-10-05 | Stop reason: HOSPADM

## 2021-09-29 RX ADMIN — INSULIN LISPRO 1 UNITS: 100 INJECTION, SOLUTION INTRAVENOUS; SUBCUTANEOUS at 18:30

## 2021-09-29 RX ADMIN — INSULIN GLARGINE 12 UNITS: 100 INJECTION, SOLUTION SUBCUTANEOUS at 22:25

## 2021-09-29 RX ADMIN — LORAZEPAM 0.5 MG: 2 INJECTION INTRAMUSCULAR; INTRAVENOUS at 20:46

## 2021-09-29 RX ADMIN — SODIUM CHLORIDE 125 ML/HR: 0.9 INJECTION, SOLUTION INTRAVENOUS at 10:44

## 2021-09-29 RX ADMIN — MIDODRINE HYDROCHLORIDE 2.5 MG: 2.5 TABLET ORAL at 18:26

## 2021-09-29 RX ADMIN — MIDODRINE HYDROCHLORIDE 2.5 MG: 2.5 TABLET ORAL at 22:25

## 2021-09-29 RX ADMIN — CLOPIDOGREL 75 MG: 75 TABLET, FILM COATED ORAL at 22:25

## 2021-09-29 RX ADMIN — TAMSULOSIN HYDROCHLORIDE 0.4 MG: 0.4 CAPSULE ORAL at 18:26

## 2021-09-29 RX ADMIN — MIRTAZAPINE 15 MG: 15 TABLET, FILM COATED ORAL at 22:25

## 2021-09-29 RX ADMIN — BUSPIRONE HYDROCHLORIDE 5 MG: 5 TABLET ORAL at 22:25

## 2021-09-29 RX ADMIN — HEPARIN SODIUM 5000 UNITS: 5000 INJECTION INTRAVENOUS; SUBCUTANEOUS at 22:25

## 2021-09-29 RX ADMIN — RISPERIDONE 0.25 MG: 0.25 TABLET ORAL at 18:26

## 2021-09-29 NOTE — TELEPHONE ENCOUNTER
MADDI Lancaster from [address] [place] [address], asked on call provider Dr Ladan Crowder to call er back at [phone] regarding a behavioral health order for the patient  Dr Ladan Crowder was paged via tiger connect

## 2021-09-30 LAB
ANION GAP SERPL CALCULATED.3IONS-SCNC: 9 MMOL/L (ref 4–13)
BILIRUB UR QL STRIP: NEGATIVE
BUN SERPL-MCNC: 27 MG/DL (ref 5–25)
CALCIUM SERPL-MCNC: 8.4 MG/DL (ref 8.3–10.1)
CHLORIDE SERPL-SCNC: 108 MMOL/L (ref 100–108)
CLARITY UR: CLEAR
CO2 SERPL-SCNC: 26 MMOL/L (ref 21–32)
COLOR UR: YELLOW
CREAT SERPL-MCNC: 1.68 MG/DL (ref 0.6–1.3)
ERYTHROCYTE [DISTWIDTH] IN BLOOD BY AUTOMATED COUNT: 14.3 % (ref 11.6–15.1)
GFR SERPL CREATININE-BSD FRML MDRD: 35 ML/MIN/1.73SQ M
GLUCOSE SERPL-MCNC: 120 MG/DL (ref 65–140)
GLUCOSE SERPL-MCNC: 169 MG/DL (ref 65–140)
GLUCOSE SERPL-MCNC: 173 MG/DL (ref 65–140)
GLUCOSE SERPL-MCNC: 211 MG/DL (ref 65–140)
GLUCOSE UR STRIP-MCNC: ABNORMAL MG/DL
HCT VFR BLD AUTO: 38.2 % (ref 36.5–49.3)
HGB BLD-MCNC: 11.8 G/DL (ref 12–17)
HGB UR QL STRIP.AUTO: NEGATIVE
KETONES UR STRIP-MCNC: NEGATIVE MG/DL
LEUKOCYTE ESTERASE UR QL STRIP: NEGATIVE
MCH RBC QN AUTO: 29.4 PG (ref 26.8–34.3)
MCHC RBC AUTO-ENTMCNC: 30.9 G/DL (ref 31.4–37.4)
MCV RBC AUTO: 95 FL (ref 82–98)
NITRITE UR QL STRIP: NEGATIVE
PH UR STRIP.AUTO: 5.5 [PH]
PLATELET # BLD AUTO: 242 THOUSANDS/UL (ref 149–390)
PMV BLD AUTO: 10.4 FL (ref 8.9–12.7)
POTASSIUM SERPL-SCNC: 4.1 MMOL/L (ref 3.5–5.3)
PROCALCITONIN SERPL-MCNC: <0.05 NG/ML
PROT UR STRIP-MCNC: NEGATIVE MG/DL
RBC # BLD AUTO: 4.01 MILLION/UL (ref 3.88–5.62)
SODIUM SERPL-SCNC: 143 MMOL/L (ref 136–145)
SP GR UR STRIP.AUTO: >=1.03 (ref 1–1.03)
UROBILINOGEN UR QL STRIP.AUTO: 0.2 E.U./DL
WBC # BLD AUTO: 9.31 THOUSAND/UL (ref 4.31–10.16)

## 2021-09-30 PROCEDURE — 80048 BASIC METABOLIC PNL TOTAL CA: CPT | Performed by: STUDENT IN AN ORGANIZED HEALTH CARE EDUCATION/TRAINING PROGRAM

## 2021-09-30 PROCEDURE — 81003 URINALYSIS AUTO W/O SCOPE: CPT | Performed by: PHYSICIAN ASSISTANT

## 2021-09-30 PROCEDURE — 82948 REAGENT STRIP/BLOOD GLUCOSE: CPT

## 2021-09-30 PROCEDURE — 84145 PROCALCITONIN (PCT): CPT | Performed by: STUDENT IN AN ORGANIZED HEALTH CARE EDUCATION/TRAINING PROGRAM

## 2021-09-30 PROCEDURE — 85027 COMPLETE CBC AUTOMATED: CPT | Performed by: STUDENT IN AN ORGANIZED HEALTH CARE EDUCATION/TRAINING PROGRAM

## 2021-09-30 PROCEDURE — 99232 SBSQ HOSP IP/OBS MODERATE 35: CPT | Performed by: INTERNAL MEDICINE

## 2021-09-30 PROCEDURE — 99223 1ST HOSP IP/OBS HIGH 75: CPT | Performed by: PSYCHIATRY & NEUROLOGY

## 2021-09-30 RX ORDER — RISPERIDONE 0.25 MG/1
0.25 TABLET, FILM COATED ORAL DAILY
Status: DISCONTINUED | OUTPATIENT
Start: 2021-10-01 | End: 2021-10-05 | Stop reason: HOSPADM

## 2021-09-30 RX ORDER — RISPERIDONE 0.25 MG/1
0.5 TABLET, FILM COATED ORAL EVERY EVENING
Status: DISCONTINUED | OUTPATIENT
Start: 2021-09-30 | End: 2021-09-30

## 2021-09-30 RX ORDER — RISPERIDONE 0.25 MG/1
0.25 TABLET, FILM COATED ORAL ONCE
Status: COMPLETED | OUTPATIENT
Start: 2021-09-30 | End: 2021-09-30

## 2021-09-30 RX ORDER — RISPERIDONE 0.25 MG/1
0.5 TABLET, FILM COATED ORAL EVERY EVENING
Status: DISCONTINUED | OUTPATIENT
Start: 2021-10-01 | End: 2021-10-05 | Stop reason: HOSPADM

## 2021-09-30 RX ADMIN — SODIUM CHLORIDE 100 ML/HR: 0.9 INJECTION, SOLUTION INTRAVENOUS at 12:35

## 2021-09-30 RX ADMIN — MIDODRINE HYDROCHLORIDE 2.5 MG: 2.5 TABLET ORAL at 20:16

## 2021-09-30 RX ADMIN — INSULIN LISPRO 1 UNITS: 100 INJECTION, SOLUTION INTRAVENOUS; SUBCUTANEOUS at 16:56

## 2021-09-30 RX ADMIN — RISPERIDONE 0.25 MG: 0.25 TABLET ORAL at 20:16

## 2021-09-30 RX ADMIN — MIDODRINE HYDROCHLORIDE 2.5 MG: 2.5 TABLET ORAL at 10:21

## 2021-09-30 RX ADMIN — RISPERIDONE 0.25 MG: 0.25 TABLET ORAL at 10:21

## 2021-09-30 RX ADMIN — CLOPIDOGREL 75 MG: 75 TABLET, FILM COATED ORAL at 21:40

## 2021-09-30 RX ADMIN — INSULIN GLARGINE 12 UNITS: 100 INJECTION, SOLUTION SUBCUTANEOUS at 21:40

## 2021-09-30 RX ADMIN — MIRTAZAPINE 15 MG: 15 TABLET, FILM COATED ORAL at 21:40

## 2021-09-30 RX ADMIN — Medication 400 MCG: at 05:39

## 2021-09-30 RX ADMIN — HEPARIN SODIUM 5000 UNITS: 5000 INJECTION INTRAVENOUS; SUBCUTANEOUS at 05:40

## 2021-09-30 RX ADMIN — MIDODRINE HYDROCHLORIDE 2.5 MG: 2.5 TABLET ORAL at 16:56

## 2021-09-30 RX ADMIN — HEPARIN SODIUM 5000 UNITS: 5000 INJECTION INTRAVENOUS; SUBCUTANEOUS at 21:40

## 2021-09-30 RX ADMIN — BUSPIRONE HYDROCHLORIDE 5 MG: 5 TABLET ORAL at 10:21

## 2021-09-30 RX ADMIN — RISPERIDONE 0.25 MG: 0.25 TABLET ORAL at 17:04

## 2021-09-30 RX ADMIN — TAMSULOSIN HYDROCHLORIDE 0.4 MG: 0.4 CAPSULE ORAL at 16:56

## 2021-09-30 RX ADMIN — BUSPIRONE HYDROCHLORIDE 5 MG: 5 TABLET ORAL at 20:17

## 2021-09-30 RX ADMIN — HEPARIN SODIUM 5000 UNITS: 5000 INJECTION INTRAVENOUS; SUBCUTANEOUS at 14:08

## 2021-09-30 RX ADMIN — LORAZEPAM 0.5 MG: 2 INJECTION INTRAMUSCULAR; INTRAVENOUS at 02:56

## 2021-09-30 RX ADMIN — INSULIN LISPRO 1 UNITS: 100 INJECTION, SOLUTION INTRAVENOUS; SUBCUTANEOUS at 12:10

## 2021-10-01 PROBLEM — E43 SEVERE PROTEIN-CALORIE MALNUTRITION (HCC): Status: ACTIVE | Noted: 2021-10-01

## 2021-10-01 LAB
ANION GAP SERPL CALCULATED.3IONS-SCNC: 8 MMOL/L (ref 4–13)
BASOPHILS # BLD AUTO: 0.04 THOUSANDS/ΜL (ref 0–0.1)
BASOPHILS NFR BLD AUTO: 0 % (ref 0–1)
BUN SERPL-MCNC: 19 MG/DL (ref 5–25)
CALCIUM SERPL-MCNC: 8.6 MG/DL (ref 8.3–10.1)
CHLORIDE SERPL-SCNC: 108 MMOL/L (ref 100–108)
CO2 SERPL-SCNC: 26 MMOL/L (ref 21–32)
CREAT SERPL-MCNC: 1.54 MG/DL (ref 0.6–1.3)
EOSINOPHIL # BLD AUTO: 0.12 THOUSAND/ΜL (ref 0–0.61)
EOSINOPHIL NFR BLD AUTO: 1 % (ref 0–6)
ERYTHROCYTE [DISTWIDTH] IN BLOOD BY AUTOMATED COUNT: 14.5 % (ref 11.6–15.1)
GFR SERPL CREATININE-BSD FRML MDRD: 39 ML/MIN/1.73SQ M
GLUCOSE SERPL-MCNC: 153 MG/DL (ref 65–140)
GLUCOSE SERPL-MCNC: 158 MG/DL (ref 65–140)
GLUCOSE SERPL-MCNC: 161 MG/DL (ref 65–140)
GLUCOSE SERPL-MCNC: 165 MG/DL (ref 65–140)
GLUCOSE SERPL-MCNC: 180 MG/DL (ref 65–140)
HCT VFR BLD AUTO: 36.8 % (ref 36.5–49.3)
HGB BLD-MCNC: 11.5 G/DL (ref 12–17)
IMM GRANULOCYTES # BLD AUTO: 0.08 THOUSAND/UL (ref 0–0.2)
IMM GRANULOCYTES NFR BLD AUTO: 1 % (ref 0–2)
LYMPHOCYTES # BLD AUTO: 1.06 THOUSANDS/ΜL (ref 0.6–4.47)
LYMPHOCYTES NFR BLD AUTO: 9 % (ref 14–44)
MCH RBC QN AUTO: 29.4 PG (ref 26.8–34.3)
MCHC RBC AUTO-ENTMCNC: 31.3 G/DL (ref 31.4–37.4)
MCV RBC AUTO: 94 FL (ref 82–98)
MONOCYTES # BLD AUTO: 0.78 THOUSAND/ΜL (ref 0.17–1.22)
MONOCYTES NFR BLD AUTO: 7 % (ref 4–12)
NEUTROPHILS # BLD AUTO: 9.84 THOUSANDS/ΜL (ref 1.85–7.62)
NEUTS SEG NFR BLD AUTO: 82 % (ref 43–75)
NRBC BLD AUTO-RTO: 0 /100 WBCS
PLATELET # BLD AUTO: 222 THOUSANDS/UL (ref 149–390)
PMV BLD AUTO: 10.4 FL (ref 8.9–12.7)
POTASSIUM SERPL-SCNC: 4.1 MMOL/L (ref 3.5–5.3)
PROCALCITONIN SERPL-MCNC: <0.05 NG/ML
RBC # BLD AUTO: 3.91 MILLION/UL (ref 3.88–5.62)
SODIUM SERPL-SCNC: 142 MMOL/L (ref 136–145)
WBC # BLD AUTO: 11.92 THOUSAND/UL (ref 4.31–10.16)

## 2021-10-01 PROCEDURE — 84145 PROCALCITONIN (PCT): CPT | Performed by: STUDENT IN AN ORGANIZED HEALTH CARE EDUCATION/TRAINING PROGRAM

## 2021-10-01 PROCEDURE — 97163 PT EVAL HIGH COMPLEX 45 MIN: CPT

## 2021-10-01 PROCEDURE — 80048 BASIC METABOLIC PNL TOTAL CA: CPT | Performed by: INTERNAL MEDICINE

## 2021-10-01 PROCEDURE — 82948 REAGENT STRIP/BLOOD GLUCOSE: CPT

## 2021-10-01 PROCEDURE — 99232 SBSQ HOSP IP/OBS MODERATE 35: CPT | Performed by: INTERNAL MEDICINE

## 2021-10-01 PROCEDURE — 85025 COMPLETE CBC W/AUTO DIFF WBC: CPT | Performed by: INTERNAL MEDICINE

## 2021-10-01 RX ADMIN — TAMSULOSIN HYDROCHLORIDE 0.4 MG: 0.4 CAPSULE ORAL at 17:46

## 2021-10-01 RX ADMIN — HEPARIN SODIUM 5000 UNITS: 5000 INJECTION INTRAVENOUS; SUBCUTANEOUS at 22:50

## 2021-10-01 RX ADMIN — MIRTAZAPINE 15 MG: 15 TABLET, FILM COATED ORAL at 22:50

## 2021-10-01 RX ADMIN — RISPERIDONE 0.25 MG: 0.25 TABLET ORAL at 10:36

## 2021-10-01 RX ADMIN — BUSPIRONE HYDROCHLORIDE 5 MG: 5 TABLET ORAL at 21:14

## 2021-10-01 RX ADMIN — HEPARIN SODIUM 5000 UNITS: 5000 INJECTION INTRAVENOUS; SUBCUTANEOUS at 13:34

## 2021-10-01 RX ADMIN — MIDODRINE HYDROCHLORIDE 2.5 MG: 2.5 TABLET ORAL at 17:46

## 2021-10-01 RX ADMIN — RISPERIDONE 0.5 MG: 0.25 TABLET ORAL at 17:46

## 2021-10-01 RX ADMIN — INSULIN LISPRO 1 UNITS: 100 INJECTION, SOLUTION INTRAVENOUS; SUBCUTANEOUS at 17:55

## 2021-10-01 RX ADMIN — CLOPIDOGREL 75 MG: 75 TABLET, FILM COATED ORAL at 22:50

## 2021-10-01 RX ADMIN — MIDODRINE HYDROCHLORIDE 2.5 MG: 2.5 TABLET ORAL at 10:36

## 2021-10-01 RX ADMIN — Medication 400 MCG: at 05:15

## 2021-10-01 RX ADMIN — HEPARIN SODIUM 5000 UNITS: 5000 INJECTION INTRAVENOUS; SUBCUTANEOUS at 05:15

## 2021-10-01 RX ADMIN — LORAZEPAM 0.5 MG: 2 INJECTION INTRAMUSCULAR; INTRAVENOUS at 21:11

## 2021-10-01 RX ADMIN — SODIUM CHLORIDE 50 ML/HR: 0.9 INJECTION, SOLUTION INTRAVENOUS at 05:16

## 2021-10-01 RX ADMIN — INSULIN GLARGINE 12 UNITS: 100 INJECTION, SOLUTION SUBCUTANEOUS at 22:49

## 2021-10-01 RX ADMIN — BUSPIRONE HYDROCHLORIDE 5 MG: 5 TABLET ORAL at 10:36

## 2021-10-01 RX ADMIN — MIDODRINE HYDROCHLORIDE 2.5 MG: 2.5 TABLET ORAL at 21:14

## 2021-10-02 PROBLEM — N40.0 BPH (BENIGN PROSTATIC HYPERPLASIA): Status: ACTIVE | Noted: 2021-10-02

## 2021-10-02 PROBLEM — I95.9 HYPOTENSION (ARTERIAL): Status: ACTIVE | Noted: 2021-10-02

## 2021-10-02 PROBLEM — R68.89: Status: ACTIVE | Noted: 2021-10-02

## 2021-10-02 PROBLEM — R68.89: Status: RESOLVED | Noted: 2021-10-02 | Resolved: 2021-10-02

## 2021-10-02 PROBLEM — D72.829 LEUKOCYTOSIS: Status: ACTIVE | Noted: 2021-10-02

## 2021-10-02 PROBLEM — J18.9 PNEUMONIA: Status: ACTIVE | Noted: 2021-10-02

## 2021-10-02 LAB
ANION GAP SERPL CALCULATED.3IONS-SCNC: 12 MMOL/L (ref 4–13)
BASOPHILS # BLD AUTO: 0.03 THOUSANDS/ΜL (ref 0–0.1)
BASOPHILS NFR BLD AUTO: 0 % (ref 0–1)
BUN SERPL-MCNC: 16 MG/DL (ref 5–25)
CALCIUM SERPL-MCNC: 8.5 MG/DL (ref 8.3–10.1)
CHLORIDE SERPL-SCNC: 108 MMOL/L (ref 100–108)
CO2 SERPL-SCNC: 21 MMOL/L (ref 21–32)
CREAT SERPL-MCNC: 1.45 MG/DL (ref 0.6–1.3)
EOSINOPHIL # BLD AUTO: 0.04 THOUSAND/ΜL (ref 0–0.61)
EOSINOPHIL NFR BLD AUTO: 0 % (ref 0–6)
ERYTHROCYTE [DISTWIDTH] IN BLOOD BY AUTOMATED COUNT: 14.6 % (ref 11.6–15.1)
GFR SERPL CREATININE-BSD FRML MDRD: 42 ML/MIN/1.73SQ M
GLUCOSE SERPL-MCNC: 146 MG/DL (ref 65–140)
GLUCOSE SERPL-MCNC: 155 MG/DL (ref 65–140)
GLUCOSE SERPL-MCNC: 164 MG/DL (ref 65–140)
GLUCOSE SERPL-MCNC: 169 MG/DL (ref 65–140)
GLUCOSE SERPL-MCNC: 235 MG/DL (ref 65–140)
HCT VFR BLD AUTO: 36.5 % (ref 36.5–49.3)
HGB BLD-MCNC: 11.3 G/DL (ref 12–17)
IMM GRANULOCYTES # BLD AUTO: 0.12 THOUSAND/UL (ref 0–0.2)
IMM GRANULOCYTES NFR BLD AUTO: 1 % (ref 0–2)
LYMPHOCYTES # BLD AUTO: 0.48 THOUSANDS/ΜL (ref 0.6–4.47)
LYMPHOCYTES NFR BLD AUTO: 3 % (ref 14–44)
MCH RBC QN AUTO: 29.7 PG (ref 26.8–34.3)
MCHC RBC AUTO-ENTMCNC: 31 G/DL (ref 31.4–37.4)
MCV RBC AUTO: 96 FL (ref 82–98)
MONOCYTES # BLD AUTO: 1.05 THOUSAND/ΜL (ref 0.17–1.22)
MONOCYTES NFR BLD AUTO: 6 % (ref 4–12)
NEUTROPHILS # BLD AUTO: 15.92 THOUSANDS/ΜL (ref 1.85–7.62)
NEUTS SEG NFR BLD AUTO: 90 % (ref 43–75)
NRBC BLD AUTO-RTO: 0 /100 WBCS
PLATELET # BLD AUTO: 212 THOUSANDS/UL (ref 149–390)
PMV BLD AUTO: 10.4 FL (ref 8.9–12.7)
POTASSIUM SERPL-SCNC: 4.3 MMOL/L (ref 3.5–5.3)
PROCALCITONIN SERPL-MCNC: 0.52 NG/ML
RBC # BLD AUTO: 3.81 MILLION/UL (ref 3.88–5.62)
SODIUM SERPL-SCNC: 141 MMOL/L (ref 136–145)
WBC # BLD AUTO: 17.64 THOUSAND/UL (ref 4.31–10.16)

## 2021-10-02 PROCEDURE — 87040 BLOOD CULTURE FOR BACTERIA: CPT | Performed by: INTERNAL MEDICINE

## 2021-10-02 PROCEDURE — 80048 BASIC METABOLIC PNL TOTAL CA: CPT | Performed by: INTERNAL MEDICINE

## 2021-10-02 PROCEDURE — 82948 REAGENT STRIP/BLOOD GLUCOSE: CPT

## 2021-10-02 PROCEDURE — 85025 COMPLETE CBC W/AUTO DIFF WBC: CPT | Performed by: INTERNAL MEDICINE

## 2021-10-02 PROCEDURE — 84145 PROCALCITONIN (PCT): CPT | Performed by: INTERNAL MEDICINE

## 2021-10-02 PROCEDURE — 99232 SBSQ HOSP IP/OBS MODERATE 35: CPT | Performed by: INTERNAL MEDICINE

## 2021-10-02 RX ADMIN — MIRTAZAPINE 15 MG: 15 TABLET, FILM COATED ORAL at 21:19

## 2021-10-02 RX ADMIN — RISPERIDONE 0.5 MG: 0.25 TABLET ORAL at 17:22

## 2021-10-02 RX ADMIN — HEPARIN SODIUM 5000 UNITS: 5000 INJECTION INTRAVENOUS; SUBCUTANEOUS at 21:19

## 2021-10-02 RX ADMIN — BUSPIRONE HYDROCHLORIDE 5 MG: 5 TABLET ORAL at 11:05

## 2021-10-02 RX ADMIN — INSULIN GLARGINE 12 UNITS: 100 INJECTION, SOLUTION SUBCUTANEOUS at 21:19

## 2021-10-02 RX ADMIN — METRONIDAZOLE 500 MG: 500 INJECTION, SOLUTION INTRAVENOUS at 15:49

## 2021-10-02 RX ADMIN — INSULIN LISPRO 2 UNITS: 100 INJECTION, SOLUTION INTRAVENOUS; SUBCUTANEOUS at 17:22

## 2021-10-02 RX ADMIN — CLOPIDOGREL 75 MG: 75 TABLET, FILM COATED ORAL at 21:19

## 2021-10-02 RX ADMIN — HEPARIN SODIUM 5000 UNITS: 5000 INJECTION INTRAVENOUS; SUBCUTANEOUS at 13:37

## 2021-10-02 RX ADMIN — TAMSULOSIN HYDROCHLORIDE 0.4 MG: 0.4 CAPSULE ORAL at 15:49

## 2021-10-02 RX ADMIN — MIDODRINE HYDROCHLORIDE 2.5 MG: 2.5 TABLET ORAL at 21:19

## 2021-10-02 RX ADMIN — ACETAMINOPHEN 650 MG: 325 TABLET ORAL at 21:50

## 2021-10-02 RX ADMIN — MIDODRINE HYDROCHLORIDE 2.5 MG: 2.5 TABLET ORAL at 11:05

## 2021-10-02 RX ADMIN — BUSPIRONE HYDROCHLORIDE 5 MG: 5 TABLET ORAL at 21:19

## 2021-10-02 RX ADMIN — INSULIN LISPRO 1 UNITS: 100 INJECTION, SOLUTION INTRAVENOUS; SUBCUTANEOUS at 12:12

## 2021-10-02 RX ADMIN — HEPARIN SODIUM 5000 UNITS: 5000 INJECTION INTRAVENOUS; SUBCUTANEOUS at 05:25

## 2021-10-02 RX ADMIN — SODIUM CHLORIDE 50 ML/HR: 0.9 INJECTION, SOLUTION INTRAVENOUS at 00:46

## 2021-10-02 RX ADMIN — ACETAMINOPHEN 650 MG: 325 TABLET ORAL at 15:49

## 2021-10-02 RX ADMIN — METRONIDAZOLE 500 MG: 500 INJECTION, SOLUTION INTRAVENOUS at 22:53

## 2021-10-02 RX ADMIN — METRONIDAZOLE 500 MG: 500 INJECTION, SOLUTION INTRAVENOUS at 07:47

## 2021-10-02 RX ADMIN — RISPERIDONE 0.25 MG: 0.25 TABLET ORAL at 11:05

## 2021-10-02 RX ADMIN — MIDODRINE HYDROCHLORIDE 2.5 MG: 2.5 TABLET ORAL at 15:49

## 2021-10-02 RX ADMIN — CEFTRIAXONE SODIUM 1000 MG: 10 INJECTION, POWDER, FOR SOLUTION INTRAVENOUS at 09:02

## 2021-10-03 PROBLEM — N18.9 ACUTE KIDNEY INJURY SUPERIMPOSED ON CHRONIC KIDNEY DISEASE (HCC): Status: ACTIVE | Noted: 2020-10-02

## 2021-10-03 LAB
ALBUMIN SERPL BCP-MCNC: 2.1 G/DL (ref 3.5–5)
ALP SERPL-CCNC: 84 U/L (ref 46–116)
ALT SERPL W P-5'-P-CCNC: 13 U/L (ref 12–78)
ANION GAP SERPL CALCULATED.3IONS-SCNC: 8 MMOL/L (ref 4–13)
AST SERPL W P-5'-P-CCNC: 26 U/L (ref 5–45)
BILIRUB SERPL-MCNC: 0.56 MG/DL (ref 0.2–1)
BUN SERPL-MCNC: 26 MG/DL (ref 5–25)
CALCIUM ALBUM COR SERPL-MCNC: 9.3 MG/DL (ref 8.3–10.1)
CALCIUM SERPL-MCNC: 7.8 MG/DL (ref 8.3–10.1)
CHLORIDE SERPL-SCNC: 111 MMOL/L (ref 100–108)
CO2 SERPL-SCNC: 24 MMOL/L (ref 21–32)
CREAT SERPL-MCNC: 1.74 MG/DL (ref 0.6–1.3)
ERYTHROCYTE [DISTWIDTH] IN BLOOD BY AUTOMATED COUNT: 15.1 % (ref 11.6–15.1)
GFR SERPL CREATININE-BSD FRML MDRD: 34 ML/MIN/1.73SQ M
GLUCOSE SERPL-MCNC: 146 MG/DL (ref 65–140)
GLUCOSE SERPL-MCNC: 148 MG/DL (ref 65–140)
GLUCOSE SERPL-MCNC: 212 MG/DL (ref 65–140)
GLUCOSE SERPL-MCNC: 230 MG/DL (ref 65–140)
GLUCOSE SERPL-MCNC: 242 MG/DL (ref 65–140)
HCT VFR BLD AUTO: 33.5 % (ref 36.5–49.3)
HGB BLD-MCNC: 10.5 G/DL (ref 12–17)
MCH RBC QN AUTO: 29.7 PG (ref 26.8–34.3)
MCHC RBC AUTO-ENTMCNC: 31.3 G/DL (ref 31.4–37.4)
MCV RBC AUTO: 95 FL (ref 82–98)
PLATELET # BLD AUTO: 196 THOUSANDS/UL (ref 149–390)
PMV BLD AUTO: 10.3 FL (ref 8.9–12.7)
POTASSIUM SERPL-SCNC: 4.1 MMOL/L (ref 3.5–5.3)
PROCALCITONIN SERPL-MCNC: 1.28 NG/ML
PROT SERPL-MCNC: 6.1 G/DL (ref 6.4–8.2)
RBC # BLD AUTO: 3.53 MILLION/UL (ref 3.88–5.62)
SODIUM SERPL-SCNC: 143 MMOL/L (ref 136–145)
WBC # BLD AUTO: 22.14 THOUSAND/UL (ref 4.31–10.16)

## 2021-10-03 PROCEDURE — 99232 SBSQ HOSP IP/OBS MODERATE 35: CPT | Performed by: STUDENT IN AN ORGANIZED HEALTH CARE EDUCATION/TRAINING PROGRAM

## 2021-10-03 PROCEDURE — 82948 REAGENT STRIP/BLOOD GLUCOSE: CPT

## 2021-10-03 PROCEDURE — 80053 COMPREHEN METABOLIC PANEL: CPT | Performed by: INTERNAL MEDICINE

## 2021-10-03 PROCEDURE — 84145 PROCALCITONIN (PCT): CPT | Performed by: INTERNAL MEDICINE

## 2021-10-03 PROCEDURE — 92610 EVALUATE SWALLOWING FUNCTION: CPT

## 2021-10-03 PROCEDURE — 85027 COMPLETE CBC AUTOMATED: CPT | Performed by: INTERNAL MEDICINE

## 2021-10-03 RX ADMIN — INSULIN LISPRO 2 UNITS: 100 INJECTION, SOLUTION INTRAVENOUS; SUBCUTANEOUS at 17:19

## 2021-10-03 RX ADMIN — RISPERIDONE 0.25 MG: 0.25 TABLET ORAL at 08:41

## 2021-10-03 RX ADMIN — CLOPIDOGREL 75 MG: 75 TABLET, FILM COATED ORAL at 22:05

## 2021-10-03 RX ADMIN — METRONIDAZOLE 500 MG: 500 INJECTION, SOLUTION INTRAVENOUS at 08:42

## 2021-10-03 RX ADMIN — TAMSULOSIN HYDROCHLORIDE 0.4 MG: 0.4 CAPSULE ORAL at 17:19

## 2021-10-03 RX ADMIN — METRONIDAZOLE 500 MG: 500 INJECTION, SOLUTION INTRAVENOUS at 22:46

## 2021-10-03 RX ADMIN — Medication 400 MCG: at 04:38

## 2021-10-03 RX ADMIN — MIDODRINE HYDROCHLORIDE 2.5 MG: 2.5 TABLET ORAL at 17:19

## 2021-10-03 RX ADMIN — HEPARIN SODIUM 5000 UNITS: 5000 INJECTION INTRAVENOUS; SUBCUTANEOUS at 13:16

## 2021-10-03 RX ADMIN — MIDODRINE HYDROCHLORIDE 2.5 MG: 2.5 TABLET ORAL at 22:04

## 2021-10-03 RX ADMIN — RISPERIDONE 0.5 MG: 0.25 TABLET ORAL at 17:19

## 2021-10-03 RX ADMIN — BUSPIRONE HYDROCHLORIDE 5 MG: 5 TABLET ORAL at 08:41

## 2021-10-03 RX ADMIN — INSULIN LISPRO 2 UNITS: 100 INJECTION, SOLUTION INTRAVENOUS; SUBCUTANEOUS at 12:11

## 2021-10-03 RX ADMIN — METRONIDAZOLE 500 MG: 500 INJECTION, SOLUTION INTRAVENOUS at 14:38

## 2021-10-03 RX ADMIN — INSULIN GLARGINE 12 UNITS: 100 INJECTION, SOLUTION SUBCUTANEOUS at 22:04

## 2021-10-03 RX ADMIN — CEFTRIAXONE SODIUM 1000 MG: 10 INJECTION, POWDER, FOR SOLUTION INTRAVENOUS at 08:41

## 2021-10-03 RX ADMIN — MIDODRINE HYDROCHLORIDE 2.5 MG: 2.5 TABLET ORAL at 08:41

## 2021-10-03 RX ADMIN — BUSPIRONE HYDROCHLORIDE 5 MG: 5 TABLET ORAL at 22:04

## 2021-10-03 RX ADMIN — MIRTAZAPINE 15 MG: 15 TABLET, FILM COATED ORAL at 22:03

## 2021-10-03 RX ADMIN — HEPARIN SODIUM 5000 UNITS: 5000 INJECTION INTRAVENOUS; SUBCUTANEOUS at 04:38

## 2021-10-03 RX ADMIN — HEPARIN SODIUM 5000 UNITS: 5000 INJECTION INTRAVENOUS; SUBCUTANEOUS at 22:04

## 2021-10-04 LAB
ALBUMIN SERPL BCP-MCNC: 2 G/DL (ref 3.5–5)
ALP SERPL-CCNC: 95 U/L (ref 46–116)
ALT SERPL W P-5'-P-CCNC: 16 U/L (ref 12–78)
ANION GAP SERPL CALCULATED.3IONS-SCNC: 9 MMOL/L (ref 4–13)
AST SERPL W P-5'-P-CCNC: 30 U/L (ref 5–45)
BACTERIA BLD CULT: NORMAL
BACTERIA BLD CULT: NORMAL
BASOPHILS # BLD AUTO: 0.03 THOUSANDS/ΜL (ref 0–0.1)
BASOPHILS NFR BLD AUTO: 0 % (ref 0–1)
BILIRUB SERPL-MCNC: 0.25 MG/DL (ref 0.2–1)
BUN SERPL-MCNC: 23 MG/DL (ref 5–25)
CALCIUM ALBUM COR SERPL-MCNC: 9.5 MG/DL (ref 8.3–10.1)
CALCIUM SERPL-MCNC: 7.9 MG/DL (ref 8.3–10.1)
CHLORIDE SERPL-SCNC: 110 MMOL/L (ref 100–108)
CO2 SERPL-SCNC: 24 MMOL/L (ref 21–32)
CREAT SERPL-MCNC: 1.57 MG/DL (ref 0.6–1.3)
EOSINOPHIL # BLD AUTO: 0.12 THOUSAND/ΜL (ref 0–0.61)
EOSINOPHIL NFR BLD AUTO: 1 % (ref 0–6)
ERYTHROCYTE [DISTWIDTH] IN BLOOD BY AUTOMATED COUNT: 14.9 % (ref 11.6–15.1)
GFR SERPL CREATININE-BSD FRML MDRD: 38 ML/MIN/1.73SQ M
GLUCOSE SERPL-MCNC: 144 MG/DL (ref 65–140)
GLUCOSE SERPL-MCNC: 158 MG/DL (ref 65–140)
GLUCOSE SERPL-MCNC: 199 MG/DL (ref 65–140)
GLUCOSE SERPL-MCNC: 201 MG/DL (ref 65–140)
GLUCOSE SERPL-MCNC: 248 MG/DL (ref 65–140)
HCT VFR BLD AUTO: 32.2 % (ref 36.5–49.3)
HGB BLD-MCNC: 10.1 G/DL (ref 12–17)
IMM GRANULOCYTES # BLD AUTO: 0.18 THOUSAND/UL (ref 0–0.2)
IMM GRANULOCYTES NFR BLD AUTO: 1 % (ref 0–2)
LYMPHOCYTES # BLD AUTO: 1.17 THOUSANDS/ΜL (ref 0.6–4.47)
LYMPHOCYTES NFR BLD AUTO: 7 % (ref 14–44)
MAGNESIUM SERPL-MCNC: 1.7 MG/DL (ref 1.6–2.6)
MCH RBC QN AUTO: 29.8 PG (ref 26.8–34.3)
MCHC RBC AUTO-ENTMCNC: 31.4 G/DL (ref 31.4–37.4)
MCV RBC AUTO: 95 FL (ref 82–98)
MONOCYTES # BLD AUTO: 0.95 THOUSAND/ΜL (ref 0.17–1.22)
MONOCYTES NFR BLD AUTO: 6 % (ref 4–12)
NEUTROPHILS # BLD AUTO: 14.08 THOUSANDS/ΜL (ref 1.85–7.62)
NEUTS SEG NFR BLD AUTO: 85 % (ref 43–75)
NRBC BLD AUTO-RTO: 0 /100 WBCS
PHOSPHATE SERPL-MCNC: 2.3 MG/DL (ref 2.3–4.1)
PLATELET # BLD AUTO: 186 THOUSANDS/UL (ref 149–390)
PMV BLD AUTO: 10.5 FL (ref 8.9–12.7)
POTASSIUM SERPL-SCNC: 3.9 MMOL/L (ref 3.5–5.3)
PROCALCITONIN SERPL-MCNC: 1.57 NG/ML
PROT SERPL-MCNC: 6 G/DL (ref 6.4–8.2)
RBC # BLD AUTO: 3.39 MILLION/UL (ref 3.88–5.62)
SODIUM SERPL-SCNC: 143 MMOL/L (ref 136–145)
WBC # BLD AUTO: 16.53 THOUSAND/UL (ref 4.31–10.16)

## 2021-10-04 PROCEDURE — 83735 ASSAY OF MAGNESIUM: CPT | Performed by: INTERNAL MEDICINE

## 2021-10-04 PROCEDURE — 84145 PROCALCITONIN (PCT): CPT | Performed by: INTERNAL MEDICINE

## 2021-10-04 PROCEDURE — 99232 SBSQ HOSP IP/OBS MODERATE 35: CPT | Performed by: INTERNAL MEDICINE

## 2021-10-04 PROCEDURE — 84100 ASSAY OF PHOSPHORUS: CPT | Performed by: INTERNAL MEDICINE

## 2021-10-04 PROCEDURE — 80053 COMPREHEN METABOLIC PANEL: CPT | Performed by: INTERNAL MEDICINE

## 2021-10-04 PROCEDURE — 82948 REAGENT STRIP/BLOOD GLUCOSE: CPT

## 2021-10-04 PROCEDURE — 99232 SBSQ HOSP IP/OBS MODERATE 35: CPT | Performed by: PSYCHIATRY & NEUROLOGY

## 2021-10-04 PROCEDURE — 85025 COMPLETE CBC W/AUTO DIFF WBC: CPT | Performed by: INTERNAL MEDICINE

## 2021-10-04 PROCEDURE — 97167 OT EVAL HIGH COMPLEX 60 MIN: CPT

## 2021-10-04 PROCEDURE — 92526 ORAL FUNCTION THERAPY: CPT

## 2021-10-04 RX ORDER — DOCUSATE SODIUM 100 MG/1
100 CAPSULE, LIQUID FILLED ORAL 2 TIMES DAILY
Status: DISCONTINUED | OUTPATIENT
Start: 2021-10-04 | End: 2021-10-05 | Stop reason: HOSPADM

## 2021-10-04 RX ORDER — POLYETHYLENE GLYCOL 3350 17 G/17G
17 POWDER, FOR SOLUTION ORAL DAILY PRN
Status: DISCONTINUED | OUTPATIENT
Start: 2021-10-04 | End: 2021-10-05 | Stop reason: HOSPADM

## 2021-10-04 RX ADMIN — TAMSULOSIN HYDROCHLORIDE 0.4 MG: 0.4 CAPSULE ORAL at 17:24

## 2021-10-04 RX ADMIN — HEPARIN SODIUM 5000 UNITS: 5000 INJECTION INTRAVENOUS; SUBCUTANEOUS at 12:43

## 2021-10-04 RX ADMIN — DOCUSATE SODIUM 100 MG: 100 CAPSULE, LIQUID FILLED ORAL at 17:24

## 2021-10-04 RX ADMIN — MIDODRINE HYDROCHLORIDE 2.5 MG: 2.5 TABLET ORAL at 08:00

## 2021-10-04 RX ADMIN — INSULIN LISPRO 1 UNITS: 100 INJECTION, SOLUTION INTRAVENOUS; SUBCUTANEOUS at 11:49

## 2021-10-04 RX ADMIN — RISPERIDONE 0.5 MG: 0.25 TABLET ORAL at 17:24

## 2021-10-04 RX ADMIN — METRONIDAZOLE 500 MG: 500 INJECTION, SOLUTION INTRAVENOUS at 07:54

## 2021-10-04 RX ADMIN — BUSPIRONE HYDROCHLORIDE 5 MG: 5 TABLET ORAL at 08:00

## 2021-10-04 RX ADMIN — INSULIN LISPRO 1 UNITS: 100 INJECTION, SOLUTION INTRAVENOUS; SUBCUTANEOUS at 07:58

## 2021-10-04 RX ADMIN — Medication 400 MCG: at 05:40

## 2021-10-04 RX ADMIN — MIRTAZAPINE 15 MG: 15 TABLET, FILM COATED ORAL at 23:17

## 2021-10-04 RX ADMIN — CLOPIDOGREL 75 MG: 75 TABLET, FILM COATED ORAL at 21:55

## 2021-10-04 RX ADMIN — BUSPIRONE HYDROCHLORIDE 5 MG: 5 TABLET ORAL at 21:55

## 2021-10-04 RX ADMIN — MIDODRINE HYDROCHLORIDE 2.5 MG: 2.5 TABLET ORAL at 21:54

## 2021-10-04 RX ADMIN — INSULIN LISPRO 2 UNITS: 100 INJECTION, SOLUTION INTRAVENOUS; SUBCUTANEOUS at 17:24

## 2021-10-04 RX ADMIN — METRONIDAZOLE 500 MG: 500 INJECTION, SOLUTION INTRAVENOUS at 23:24

## 2021-10-04 RX ADMIN — HEPARIN SODIUM 5000 UNITS: 5000 INJECTION INTRAVENOUS; SUBCUTANEOUS at 22:01

## 2021-10-04 RX ADMIN — INSULIN GLARGINE 12 UNITS: 100 INJECTION, SOLUTION SUBCUTANEOUS at 23:17

## 2021-10-04 RX ADMIN — METRONIDAZOLE 500 MG: 500 INJECTION, SOLUTION INTRAVENOUS at 17:29

## 2021-10-04 RX ADMIN — RISPERIDONE 0.25 MG: 0.25 TABLET ORAL at 08:00

## 2021-10-04 RX ADMIN — HEPARIN SODIUM 5000 UNITS: 5000 INJECTION INTRAVENOUS; SUBCUTANEOUS at 05:39

## 2021-10-04 RX ADMIN — CEFTRIAXONE SODIUM 1000 MG: 10 INJECTION, POWDER, FOR SOLUTION INTRAVENOUS at 08:42

## 2021-10-05 VITALS
RESPIRATION RATE: 18 BRPM | TEMPERATURE: 97.5 F | WEIGHT: 171.96 LBS | DIASTOLIC BLOOD PRESSURE: 62 MMHG | OXYGEN SATURATION: 92 % | HEART RATE: 97 BPM | BODY MASS INDEX: 23.29 KG/M2 | SYSTOLIC BLOOD PRESSURE: 117 MMHG | HEIGHT: 72 IN

## 2021-10-05 LAB
GLUCOSE SERPL-MCNC: 157 MG/DL (ref 65–140)
GLUCOSE SERPL-MCNC: 208 MG/DL (ref 65–140)
GLUCOSE SERPL-MCNC: 236 MG/DL (ref 65–140)
PROCALCITONIN SERPL-MCNC: 0.76 NG/ML

## 2021-10-05 PROCEDURE — 99232 SBSQ HOSP IP/OBS MODERATE 35: CPT | Performed by: PHYSICIAN ASSISTANT

## 2021-10-05 PROCEDURE — 82948 REAGENT STRIP/BLOOD GLUCOSE: CPT

## 2021-10-05 PROCEDURE — 99239 HOSP IP/OBS DSCHRG MGMT >30: CPT | Performed by: INTERNAL MEDICINE

## 2021-10-05 PROCEDURE — 84145 PROCALCITONIN (PCT): CPT | Performed by: INTERNAL MEDICINE

## 2021-10-05 RX ORDER — POLYETHYLENE GLYCOL 3350 17 G/17G
17 POWDER, FOR SOLUTION ORAL DAILY
Qty: 238 G | Refills: 0
Start: 2021-10-05 | End: 2021-10-19

## 2021-10-05 RX ORDER — METRONIDAZOLE 500 MG/1
500 TABLET ORAL EVERY 8 HOURS SCHEDULED
Qty: 9 TABLET | Refills: 0 | Status: SHIPPED | OUTPATIENT
Start: 2021-10-05 | End: 2021-10-08

## 2021-10-05 RX ORDER — RISPERIDONE 0.25 MG/1
0.25 TABLET, FILM COATED ORAL DAILY
Qty: 60 TABLET | Refills: 0 | Status: SHIPPED | OUTPATIENT
Start: 2021-10-06 | End: 2021-12-05

## 2021-10-05 RX ORDER — CEFDINIR 300 MG/1
300 CAPSULE ORAL EVERY 12 HOURS SCHEDULED
Qty: 6 CAPSULE | Refills: 0 | Status: SHIPPED | OUTPATIENT
Start: 2021-10-05 | End: 2021-10-08

## 2021-10-05 RX ADMIN — METRONIDAZOLE 500 MG: 500 INJECTION, SOLUTION INTRAVENOUS at 16:46

## 2021-10-05 RX ADMIN — INSULIN LISPRO 2 UNITS: 100 INJECTION, SOLUTION INTRAVENOUS; SUBCUTANEOUS at 12:23

## 2021-10-05 RX ADMIN — MIDODRINE HYDROCHLORIDE 2.5 MG: 2.5 TABLET ORAL at 16:45

## 2021-10-05 RX ADMIN — MIDODRINE HYDROCHLORIDE 2.5 MG: 2.5 TABLET ORAL at 09:43

## 2021-10-05 RX ADMIN — METRONIDAZOLE 500 MG: 500 INJECTION, SOLUTION INTRAVENOUS at 07:44

## 2021-10-05 RX ADMIN — CEFTRIAXONE SODIUM 1000 MG: 10 INJECTION, POWDER, FOR SOLUTION INTRAVENOUS at 09:42

## 2021-10-05 RX ADMIN — ACETAMINOPHEN 650 MG: 325 TABLET ORAL at 14:20

## 2021-10-05 RX ADMIN — INSULIN LISPRO 1 UNITS: 100 INJECTION, SOLUTION INTRAVENOUS; SUBCUTANEOUS at 09:44

## 2021-10-05 RX ADMIN — LORAZEPAM 0.5 MG: 2 INJECTION INTRAMUSCULAR; INTRAVENOUS at 04:52

## 2021-10-05 RX ADMIN — Medication 400 MCG: at 05:00

## 2021-10-05 RX ADMIN — DOCUSATE SODIUM 100 MG: 100 CAPSULE, LIQUID FILLED ORAL at 09:43

## 2021-10-05 RX ADMIN — LORAZEPAM 0.5 MG: 2 INJECTION INTRAMUSCULAR; INTRAVENOUS at 18:24

## 2021-10-05 RX ADMIN — INSULIN LISPRO 1 UNITS: 100 INJECTION, SOLUTION INTRAVENOUS; SUBCUTANEOUS at 16:46

## 2021-10-05 RX ADMIN — TAMSULOSIN HYDROCHLORIDE 0.4 MG: 0.4 CAPSULE ORAL at 16:45

## 2021-10-05 RX ADMIN — RISPERIDONE 0.5 MG: 0.25 TABLET ORAL at 17:46

## 2021-10-05 RX ADMIN — HEPARIN SODIUM 5000 UNITS: 5000 INJECTION INTRAVENOUS; SUBCUTANEOUS at 05:00

## 2021-10-05 RX ADMIN — RISPERIDONE 0.25 MG: 0.25 TABLET ORAL at 09:43

## 2021-10-05 RX ADMIN — HEPARIN SODIUM 5000 UNITS: 5000 INJECTION INTRAVENOUS; SUBCUTANEOUS at 14:36

## 2021-10-05 RX ADMIN — DOCUSATE SODIUM 100 MG: 100 CAPSULE, LIQUID FILLED ORAL at 17:47

## 2021-10-05 RX ADMIN — BUSPIRONE HYDROCHLORIDE 5 MG: 5 TABLET ORAL at 09:43

## 2021-10-08 LAB
BACTERIA BLD CULT: NORMAL
BACTERIA BLD CULT: NORMAL

## 2022-11-02 NOTE — PLAN OF CARE
Problem: Prexisting or High Potential for Compromised Skin Integrity  Goal: Skin integrity is maintained or improved  Description: INTERVENTIONS:  - Identify patients at risk for skin breakdown  - Assess and monitor skin integrity  - Assess and monitor nutrition and hydration status  - Monitor labs   - Assess for incontinence   - Turn and reposition patient  - Assist with mobility/ambulation  - Relieve pressure over bony prominences  - Avoid friction and shearing  - Provide appropriate hygiene as needed including keeping skin clean and dry  - Evaluate need for skin moisturizer/barrier cream  - Collaborate with interdisciplinary team   - Patient/family teaching  - Consider wound care consult   Outcome: Progressing     Problem: Potential for Falls  Goal: Patient will remain free of falls  Description: INTERVENTIONS:  - Educate patient/family on patient safety including physical limitations  - Instruct patient to call for assistance with activity   - Consult OT/PT to assist with strengthening/mobility   - Keep Call bell within reach  - Keep bed low and locked with side rails adjusted as appropriate  - Keep care items and personal belongings within reach  - Initiate and maintain comfort rounds  - Make Fall Risk Sign visible to staff  - Offer Toileting every 2 Hours, in advance of need  - Initiate/Maintain  bed alarm  - Obtain necessary fall risk management equipment:   - Apply yellow socks and bracelet for high fall risk patients  - Consider moving patient to room near nurses station  Outcome: Progressing     Problem: MOBILITY - ADULT  Goal: Maintain or return to baseline ADL function  Description: INTERVENTIONS:  -  Assess patient's ability to carry out ADLs; assess patient's baseline for ADL function and identify physical deficits which impact ability to perform ADLs (bathing, care of mouth/teeth, toileting, grooming, dressing, etc )  - Assess/evaluate cause of self-care deficits   - Assess range of motion  - Assess RX for Repatha [email or web address].d to [facility]   patient's mobility; develop plan if impaired  - Assess patient's need for assistive devices and provide as appropriate  - Encourage maximum independence but intervene and supervise when necessary  - Involve family in performance of ADLs  - Assess for home care needs following discharge   - Consider OT consult to assist with ADL evaluation and planning for discharge  - Provide patient education as appropriate  Outcome: Progressing  Goal: Maintains/Returns to pre admission functional level  Description: INTERVENTIONS:  - Perform BMAT or MOVE assessment daily    - Set and communicate daily mobility goal to care team and patient/family/caregiver  - Collaborate with rehabilitation services on mobility goals if consulted  - Perform Range of Motion 2 times a day  - Reposition patient every 2 hours    - Dangle patient 2 times a day  - Stand patient 2 times a day  - Ambulate patient 2 times a day  - Out of bed to chair times a day   - Out of bed for meals times a day  - Out of bed for toileting  - Record patient progress and toleration of activity level   Outcome: Progressing     Problem: INFECTION - ADULT  Goal: Absence or prevention of progression during hospitalization  Description: INTERVENTIONS:  - Assess and monitor for signs and symptoms of infection  - Monitor lab/diagnostic results  - Monitor all insertion sites, i e  indwelling lines, tubes, and drains  - Administer medications as ordered  - Instruct and encourage patient and family to use good hand hygiene technique  - Identify and instruct in appropriate isolation precautions for identified infection/condition  Outcome: Progressing  Goal: Absence of fever/infection during neutropenic period  Description: INTERVENTIONS:  - Monitor WBC    Outcome: Progressing     Problem: SAFETY ADULT  Goal: Patient will remain free of falls  Description: INTERVENTIONS:  - Educate patient/family on patient safety including physical limitations  - Instruct patient to call for assistance with activity   - Consult OT/PT to assist with strengthening/mobility   - Keep Call bell within reach  - Keep bed low and locked with side rails adjusted as appropriate  - Keep care items and personal belongings within reach  - Initiate and maintain comfort rounds  - Make Fall Risk Sign visible to staff  - Offer Toileting every 2 Hours, in advance of need  - Initiate/Maintain  bed alarm  - Obtain necessary fall risk management equipment:   - Apply yellow socks and bracelet for high fall risk patients  - Consider moving patient to room near nurses station  Outcome: Progressing  Goal: Maintain or return to baseline ADL function  Description: INTERVENTIONS:  -  Assess patient's ability to carry out ADLs; assess patient's baseline for ADL function and identify physical deficits which impact ability to perform ADLs (bathing, care of mouth/teeth, toileting, grooming, dressing, etc )  - Assess/evaluate cause of self-care deficits   - Assess range of motion  - Assess patient's mobility; develop plan if impaired  - Assess patient's need for assistive devices and provide as appropriate  - Encourage maximum independence but intervene and supervise when necessary  - Involve family in performance of ADLs  - Assess for home care needs following discharge   - Consider OT consult to assist with ADL evaluation and planning for discharge  - Provide patient education as appropriate  Outcome: Progressing  Goal: Maintains/Returns to pre admission functional level  Description: INTERVENTIONS:  - Perform BMAT or MOVE assessment daily    - Set and communicate daily mobility goal to care team and patient/family/caregiver  - Collaborate with rehabilitation services on mobility goals if consulted  - Perform Range of Motion 2 times a day  - Reposition patient every 2 hours    - Dangle patient 2 times a day  - Stand patient  times a day  - Ambulate patient times a day  - Out of bed to chair times a day   - Out of bed for meals times a day  - Out of bed for toileting  - Record patient progress and toleration of activity level   Outcome: Progressing     Problem: DISCHARGE PLANNING  Goal: Discharge to home or other facility with appropriate resources  Description: INTERVENTIONS:  - Identify barriers to discharge w/patient and caregiver  - Arrange for needed discharge resources and transportation as appropriate  - Identify discharge learning needs (meds, wound care, etc )  - Arrange for interpretive services to assist at discharge as needed  - Refer to Case Management Department for coordinating discharge planning if the patient needs post-hospital services based on physician/advanced practitioner order or complex needs related to functional status, cognitive ability, or social support system  Outcome: Progressing     Problem: Knowledge Deficit  Goal: Patient/family/caregiver demonstrates understanding of disease process, treatment plan, medications, and discharge instructions  Description: Complete learning assessment and assess knowledge base  Interventions:  - Provide teaching at level of understanding  - Provide teaching via preferred learning methods  Outcome: Progressing     Problem: Nutrition/Hydration-ADULT  Goal: Nutrient/Hydration intake appropriate for improving, restoring or maintaining nutritional needs  Description: Monitor and assess patient's nutrition/hydration status for malnutrition  Collaborate with interdisciplinary team and initiate plan and interventions as ordered  Monitor patient's weight and dietary intake as ordered or per policy  Utilize nutrition screening tool and intervene as necessary  Determine patient's food preferences and provide high-protein, high-caloric foods as appropriate       INTERVENTIONS:  - Monitor oral intake, urinary output, labs, and treatment plans  - Assess nutrition and hydration status and recommend course of action  - Evaluate amount of meals eaten  - Assist patient with eating if necessary   - Allow adequate time for meals  - Recommend/ encourage appropriate diets, oral nutritional supplements, and vitamin/mineral supplements  - Order, calculate, and assess calorie counts as needed  - Assess need for intravenous fluids  - Provide specific nutrition/hydration education as appropriate  - Include patient/family/caregiver in decisions related to nutrition  Outcome: Progressing